# Patient Record
Sex: MALE | Race: WHITE | Employment: FULL TIME | URBAN - METROPOLITAN AREA
[De-identification: names, ages, dates, MRNs, and addresses within clinical notes are randomized per-mention and may not be internally consistent; named-entity substitution may affect disease eponyms.]

---

## 2018-08-22 ENCOUNTER — HOSPITAL ENCOUNTER (OUTPATIENT)
Dept: PHYSICAL THERAPY | Age: 43
Discharge: HOME | End: 2018-08-22
Payer: COMMERCIAL

## 2018-08-22 PROCEDURE — 97014 ELECTRIC STIMULATION THERAPY: CPT

## 2018-08-22 PROCEDURE — 97162 PT EVAL MOD COMPLEX 30 MIN: CPT

## 2018-08-22 PROCEDURE — 97140 MANUAL THERAPY 1/> REGIONS: CPT

## 2018-08-22 NOTE — PROGRESS NOTES
[address] PHYSICAL THERAPY AT [facility]  [address] [facility] [address], Terr[place] jenkins, Arnie Moore  Phone [phone]  Fax [phone] / [address] [address] PHYSICAL THERAPY SERVICES  Patient Name: Rosy Marx : 1975   Medical   Diagnosis: Cervicalgia [M54.2] Treatment Diagnosis: Neck pain   Onset Date: 3+ months ago     Referral Source: Yohan Mendieta MD Start of Care [facility]): 2018   Prior Hospitalization: See medical history Provider #: [id number]   Prior Level of Function: Functionally I   Comorbidities: Prior Hx of Rt foot, Left knee, Right hip, Left hand and Right shoulder surgeries. Medications: Verified on Patient Summary List   The Plan of Care and following information is based on the information from the initial evaluation.   =================================================================================  Assessment / key information:  38year old male presents for PT evaluation with diagnosis of neck pain. Patient reports that pain started greater than 3 months ago with unknown cause. Per patient report, around that time he was participating in drills at work that involved rapid passive neck movement. Patient is unsure if these events are related to the neck pain. Patient is employed by the [employer] as a . Pain can range from a 2-5/10 with intermittent nerve type \"zinger\" pain down through LEft UE. Patient reports pain starts at base of head on the left and travels inferiorly and laterally along Left Lats and into Left UE. Patient also c/o intermittent numbness and tingling into Left UE. Past treatments have included chiropractic care and massage therapy with no significant changes. Negative for red flags. FOTO 46/100.      Functional limitations include decreased ability to look up and to the left effecting ability to check blind sport while driving, decreased ability to turn over in bed, decreased ability to sleep or find comfortable position while sleeping. Clinical exam reveals replication of symptoms with active cervical rotation to the right when starting at end range of left rotation, c/o increased numbness and tingling into Left UE with cervical spine in passive extended position in supine and decreased symptoms with neck in supported neutral position, elevated first rib on Right with numbness into Left UE with mobilization of Right rib and replication of symptoms while performing resisted thoracic rotation. Special tests for Thoracic outlet syndrome negative and manual cervical traction caused no change in symptoms. B UE Strength is 5/5 and increased tone in B Upper traps and Left scalenes and SCM.    Patient will benefit from an episode of skilled PT to address above functional limitations and clinical deficits to improve quality of life and return patient to [facility].     =================================================================================  Eval Complexity: History: HIGH Complexity :3+ comorbidities / personal factors will impact the outcome/ POC Exam:HIGH Complexity : 4+ Standardized tests and measures addressing body structure, function, activity limitation and / or participation in recreation  Presentation: HIGH Complexity : Unstable and unpredictable characteristics  Clinical Decision Making:MEDIUM Complexity : FOTO score of 26-74Overall Complexity:MEDIUM  Problem List: pain affecting function, decrease ROM, decrease strength, decrease ADL/ functional abilitiies and decrease flexibility/ joint mobility   Treatment Plan may include any combination of the following: Therapeutic exercise, Therapeutic activities, Neuromuscular re-education, Physical agent/modality, Manual therapy, Patient education, Self Care training and Functional mobility training  Patient / Family readiness to learn indicated by: asking questions and interest  Persons(s) to be included in education: patient (P)  Barriers to Learning/Limitations: None  Measures taken:    Patient Goal (s): Pain free full ROM   Patient self reported health status: good  Rehabilitation Potential: good   Short Term Goals: To be accomplished in  3  treatments:  1. PT and patient will establish HEP to increase Cervical and Thoracic AROM. 2 Patient will report decreased pain to 2/10 or less to allow for increased ability to perform cervical ROM.  Long Term Goals: To be accomplished in  4-6  weeks:  1. Patient will increase Cervical and Thoracic spine AROM to WNL with painfree arc of motion to allow for return to PLOF. 2. Patient will report decreased frequency of sharp Left sided neck and Left UE pain to 1 x week to allow for increased function use of Left UE. 3. Patient will increase FOTO score from 46 to at least 65/100 to indicate improved functional use of BUE and cervical spine. 4. Patient will demonstrate I and compliance with HEP to allow for DC to HEP at completion of PT. Frequency / Duration:   Patient to be seen  2   times per week for 4-6  weeks:  Patient / Caregiver education and instruction: self care  G-Codes (GP): NA  Therapist Signature: Duglas Díaz, PT Date: 0/66/8765   Certification Period: NA Time: 4:04 PM   ===========================================================================================  I certify that the above Physical Therapy Services are being furnished while the patient is under my care. I agree with the treatment plan and certify that this therapy is necessary. Physician Signature:        Date:       Time:     Please sign and return to [facility] or you may fax the signed copy to [phone]. Thank you.

## 2018-08-22 NOTE — PROGRESS NOTES
PT DAILY TREATMENT NOTE/CERVICAL EVAL3-16    Patient Name: Vilma Martínez  Date:2018  : 1975  [x]  Patient  Verified  Payor: BLUE CROSS / Plan: [facility] / Product Type: PPO /    In time:1020  Out time:1120  Total Treatment Time (min): 60  1:1 time 45 min   Timed Codes: 20 min   Visit #: 1 of 8-    Treatment Area: Cervicalgia [M54.2]    SUBJECTIVE  Pain Level (0-10 scale): 3/10  []constant []intermittent []improving []worsening []no change since onset    Any medication changes, allergies to medications, adverse drug reactions, diagnosis change, or new procedure performed?: [x] No    [] Yes (see summary sheet for update)  Subjective functional status/changes:   38year old male presents for PT evaluation with diagnosis of neck pain. Patient reports that pain started greater than 3 months ago with unknown cause. Per patient report, around that time he was participating in drills at work that involved rapid passive neck movement. Patient is unsure if these events are related to the neck pain. Patient is employed by the [employer] as a  and must carry a 30-35 pound vest and kit while working . Pain can range from a 2-5/10 with intermittent nerve type \"zinger\" pain down through LEft UE. Patient reports pain starts at base of head on the left and travels inferiorly and laterally along Left Lats and into Left UE. Patient also c/o intermittent numbness and tingling into Left UE. Past treatments have included chiropractic care and massage therapy with no significant changes. Negative for red flags. FOTO 46/100.      Functional limitations include decreased ability to look up and to the left effecting ability to check blind sport while driving, decreased ability to turn over in bed, decreased ability to sleep or find comfortable position while sleeping.        OBJECTIVE/EXAMINATION    Modality rationale: decrease pain and increase tissue extensibility to improve the patients ability to tolerate active neck movement    Min Type Additional Details   15 [x] Estim:  [x]Unatt       []IFC  []Premod                        []Other:  []w/ice   [x]w/heat  Position:  Location:    [] Estim: []Att    []TENS instruct  []NMES                    []Other:  []w/US   []w/ice   []w/heat  Position:  Location:    []  Traction: [] Cervical       []Lumbar                       [] Prone          []Supine                       []Intermittent   []Continuous Lbs:  [] before manual  [] after manual    []  Ultrasound: []Continuous   [] Pulsed                           []1MHz   []3MHz Location:  W/cm2:    []  Iontophoresis with dexamethasone         Location: [] Take home patch   [] In clinic    []  Ice     []  heat  []  Ice massage  []  Laser   []  Anodyne Position:  Location:    []  Laser with stim  []  Other: Position:  Location:    []  Vasopneumatic Device Pressure:       [] lo [] med [] hi   Temperature: [] lo [] med [] hi   [] Skin assessment post-treatment:  []intact []redness- no adverse reaction    []redness - adverse reaction:     25 min [x]Eval                  []Re-Eval     20 min Manual Therapy:  MET for elevated 1st rib on Right. MET to facilitate Cervical and Thoracic vertebral rotation   Rationale: decrease pain and increase tissue extensibility to increase functional I and ability to sleep           With   [] TE   [] TA   [] neuro   [] other: Patient Education: [x] Review HEP    [] Progressed/Changed HEP based on:   [] positioning   [] body mechanics   [] transfers   [] heat/ice application    [] other:      Physical Therapy Evaluation Cervical Spine    Clinical exam reveals :  Full Passive Cervical spine motion in supine.    Painful cervical rotation actively  Replication of symptoms with active cervical rotation to the right when starting at end range of left rotation  Increased numbness and tingling into Left UE with cervical spine in passive extended position in supine  Decreased symptoms with neck in supported neutral position  Elevated first rib on Right with numbness into Left UE with mobilization of Right rib  Replication of symptoms while performing resisted thoracic rotation. Special tests for Thoracic outlet syndrome negative and manual cervical traction caused no change in symptoms. B UE Strength is 5/5   Increased tone and decreased flexibility  in B Upper traps and Left scalenes and SCM. Pain Level (0-10 scale) post treatment: 2/10    ASSESSMENT/Changes in Function: Patient will benefit from an episode of skilled PT to address above functional limitations and clinical deficits to improve quality of life and return patient to PLOF. Slight decrease in reports of pain after manual interventions and ESTIM to LEft C spine      [x]  See Plan of Care  []  See progress note/recertification  []  See Discharge Summary         Progress towards goals / Updated goals: · Short Term Goals: To be accomplished in  3  treatments:  1. PT and patient will establish HEP to increase Cervical and Thoracic AROM. 2 Patient will report decreased pain to 2/10 or less to allow for increased ability to perform cervical ROM.    · Long Term Goals: To be accomplished in  4-6  weeks:  1. Patient will increase Cervical and Thoracic spine AROM to WNL with painfree arc of motion to allow for return to PLOF. 2. Patient will report decreased frequency of sharp Left sided neck and Left UE pain to 1 x week to allow for increased function use of Left UE. 3. Patient will increase FOTO score from 46 to at least 65/100 to indicate improved functional use of BUE and cervical spine. 4. Patient will demonstrate I and compliance with HEP to allow for DC to HEP at completion of PT. PLAN  []  Upgrade activities as tolerated     []  Continue plan of care  []  Update interventions per flow sheet       []  Discharge due to:_  [x]  Other:_  2 times a week for 4-6 weeks.      Kalee Mark, PT 8/22/2018  10:25 AM

## 2018-08-27 ENCOUNTER — HOSPITAL ENCOUNTER (OUTPATIENT)
Dept: PHYSICAL THERAPY | Age: 43
Discharge: HOME | End: 2018-08-27
Payer: COMMERCIAL

## 2018-08-27 PROCEDURE — 97140 MANUAL THERAPY 1/> REGIONS: CPT

## 2018-08-27 NOTE — PROGRESS NOTES
PT DAILY TREATMENT NOTE     Patient Name: Zena Dixon  Date:2018  : 1975  [x]  Patient  Verified  Payor: [employer] / Plan: [employer] [facility] / Product Type: PPO /    In time:11:30  Out time:12:30  Total Treatment Time (min): 60  BC/BS 1:1 Time (min) 40  Visit #: 2 of     Treatment Area: Cervicalgia [M54.2]    SUBJECTIVE  Pain Level IN: (0-10 scale): 310   Pain Level OUT: (0-10 scale) post treatment: 1/10    Any medication changes, allergies to medications, adverse drug reactions, diagnosis change, or new procedure performed?: [x] No    [] Yes (see summary sheet for update)  Subjective functional status/changes:   [] No changes reported  I am unable to move my head with looking left or right when I am in the slumped position and then I get those pains shooting into my (L) arm/hand     OBJECTIVE    Modality rationale: decrease edema, decrease inflammation, decrease pain and increase tissue extensibility to improve the patients ability to restore normal movement of the neck/head    Min Type Additional Details    [] Estim:  []Unatt       []IFC  []Premod                        []Other:  []w/ice   []w/heat  Position:  Location:    [] Estim: []Att    []TENS instruct  []NMES                    []Other:  []w/US   []w/ice   []w/heat  Position:  Location:    []  Traction: [] Cervical       []Lumbar                       [] Prone          []Supine                       []Intermittent   []Continuous Lbs:  [] before manual  [] after manual    []  Ultrasound: []Continuous   [] Pulsed                           []1MHz   []3MHz W/cm2:  Location:    []  Iontophoresis with dexamethasone         Location: [] Take home patch   [] In clinic   15 []  Ice     [x]  heat  []  Ice massage  []  Laser   []  Anodyne Position: in semi-reclined   Location: to (B) thoracic/cervical     []  Laser with stim  []  Other:  Position:  Location:    []  Vasopneumatic Device Pressure:       [] lo [] med [] hi Temperature: [] lo [] med [] hi   [] Skin assessment post-treatment:  []intact []redness- no adverse reaction    []redness - adverse reaction:       5 min Therapeutic Exercise:  [x] See flow sheet : first follow up visit since initial evaluation - initiated POC per flow sheet   5 min NC for warm up    Rationale: increase ROM and increase strength to improve the patients ability to restore normal cervical ROM    40 min Manual Therapy: Technique:     IASTM performed. Patient is a candidate for cupping and without contraindications at this time. Patient was instructed in the indications/contraindications of cupping technique. The patient was educated in its purpose and risks/benefits. Patient was advised that redness is normal after technique is performed and that redness will disappear typically within one week. Dynamic cupping technique performed to (L) cervical thoracic area    [x] S/DTM [x]IASTM [x]PROM [] Passive Stretching   [] Graston:  [] GT 1  [] GT 2 [] GT 3 [] GT 4 [] GT 4 [] GT 5  [] GT 6  [] Sweep [] Fan [] [place]  [] Brush   []  Swivel []J- Stroke [] Scoop []IFraming     [x]Manual TPR  [] TDN (see objective; actual needle insertion time not billed)  [x]Jt manipulation:Gr I [x] II [x]  III [] IV[] V[]  Treatment/Area: performed MET to correction T2-5 rotation lesion to the (L) with MET to the (R). MET to correct C2-6 rotation lesion to the (L) with MET to the (R).   MET and mobs to correct posterior ribs T3-6 in supine   Corrected C-T junction lesion with MET to the (R)      Rationale:      decrease pain, increase ROM, increase tissue extensibility and decrease trigger points to improve patient's ability to rotate head and perform normal cervical motion in all planes without pain shooting into the (L) arm         With   [] TE   [] TA   [] neuro   [] other: Patient Education: [x] Review HEP    [] Progressed/Changed HEP based on:   [] positioning   [] body mechanics   [] transfers   [] heat/ice application [] Graston Education: Explained the effects and benefits of Graston Technique therapy including potential for post treatment soreness and bruising. [] Other:           Objective/Functional Measures with Therapist Assessment Noted with Response to Therapy Session:     first follow up visit since initial evaluation -       initiated POC per flow sheet   Was able to correction rotation lesion to the (L) to Cervical and thoracic vertbraes with MET to the (R)  Mobs performed to the (L) posterior rib approx T3-6  Patient was able to perform cervical cervical rotation (L) and (R) with less pain shooting into the (L) arm and improved range noted. Performed cupping to decrease soft tissue restriction to the (L) medial border of scap secondary to positive door bell C5-6 causing pain directly into the (L) arm        ASSESSMENT/Changes in Function:     Patient will continue to benefit from skilled PT services to modify and progress therapeutic interventions, address functional mobility deficits, address ROM deficits, address strength deficits, analyze and address soft tissue restrictions and assess and modify postural abnormalities to attain remaining goals. [x]  See Plan of Care  []  See progress note/recertification  []  See Discharge Summary         Progress towards goals / Updated goals: · Short Term Goals: To be accomplished in  3  treatments:  1. PT and patient will establish HEP to increase Cervical and Thoracic AROM. 2 Patient will report decreased pain to 2/10 or less to allow for increased ability to perform cervical ROM.    · Long Term Goals: To be accomplished in  4-6  weeks:  1. Patient will increase Cervical and Thoracic spine AROM to WNL with painfree arc of motion to allow for return to PLOF. 2. Patient will report decreased frequency of sharp Left sided neck and Left UE pain to 1 x week to allow for increased function use of Left UE.    3. Patient will increase FOTO score from 46 to at least 65/100 to indicate improved functional use of BUE and cervical spine. 4. Patient will demonstrate I and compliance with HEP to allow for DC to HEP at completion of PT.     PLAN  [x]  Upgrade activities as tolerated     [x]  Continue plan of care  []  Update interventions per flow sheet       []  Discharge due to:_  []  Other:_      Walt Pedraza PTA 8/27/2018  2:54 PM    Future Appointments  Date Time Provider Ayaz Kelly   8/29/2018 9:30 AM Walt Pedraza PTA [facility]   9/4/2018 10:00 AM Walt Pedraza PTA [facility]   9/6/2018 4:00 PM Bradley Dacosta, [facility]   9/11/2018 9:30 AM Walt Pedraza PTA [facility]   9/13/2018 4:00 PM Bradley Dacosta PT [facility]   9/18/2018 9:30 AM Walt Pedraza PTA [facility]   9/20/2018 4:00 PM Bradley Dacosta, [facility]

## 2018-08-29 ENCOUNTER — HOSPITAL ENCOUNTER (OUTPATIENT)
Dept: PHYSICAL THERAPY | Age: 43
Discharge: HOME | End: 2018-08-29
Payer: COMMERCIAL

## 2018-08-29 PROCEDURE — 97140 MANUAL THERAPY 1/> REGIONS: CPT

## 2018-08-29 PROCEDURE — 97012 MECHANICAL TRACTION THERAPY: CPT

## 2018-08-29 NOTE — PROGRESS NOTES
PT DAILY TREATMENT NOTE     Patient Name: Stevan Coko  Date:2018  : 1975  [x]  Patient  Verified  Payor: BLUE CROSS / Plan: [employer] [facility] / Product Type: PPO /    In time:9:30  Out time:10:45  Total Treatment Time (min): 75  BC/BS 1:1 Time (min) 50  Visit #: 3 of     Treatment Area: Cervicalgia [M54.2]    SUBJECTIVE  Pain Level IN: (0-10 scale): 2/10   Pain Level OUT: (0-10 scale) post treatment: 1/10    Any medication changes, allergies to medications, adverse drug reactions, diagnosis change, or new procedure performed?: [x] No    [] Yes (see summary sheet for update)  Subjective functional status/changes:   [] No changes reported  I did a lot better after the last time I was here definitely more range until 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 I did sleep better and less tingling in my arm too while I was trying to sleep.     OBJECTIVE    Modality rationale: decrease edema, decrease inflammation, decrease pain and increase tissue extensibility to improve the patients ability to restore normal movement of the neck/head    Min Type Additional Details    [] Estim:  []Unatt       []IFC  []Premod                        []Other:  []w/ice   []w/heat  Position:  Location:    [] Estim: []Att    []TENS instruct  []NMES                    []Other:  []w/US   []w/ice   []w/heat  Position:  Location:   15+5 set up  [x]  Traction: [x] Cervical       []Lumbar                       [] Prone          []Supine                       []Intermittent   []Continuous Lbs:#25/19  [] before manual  [x] after manual    []  Ultrasound: []Continuous   [] Pulsed                           []1MHz   []3MHz W/cm2:  Location:    []  Iontophoresis with dexamethasone         Location: [] Take home patch   [] In clinic    []  Ice     [x]  heat  []  Ice massage  []  Laser   []  Anodyne Position: in semi-reclined   Location: to (B) thoracic/cervical     []  Laser with stim  [] Other:  Position:  Location:    []  Vasopneumatic Device Pressure:       [] lo [] med [] hi   Temperature: [] lo [] med [] hi   [] Skin assessment post-treatment:  []intact []redness- no adverse reaction    []redness - adverse reaction:       10/5 min Therapeutic Exercise:  [x] See flow sheet :  5 min NC for warm up    Rationale: increase ROM and increase strength to improve the patients ability to restore normal cervical ROM    45 min Manual Therapy: Technique:     IASTM performed. Patient is a candidate for cupping and without contraindications at this time. Patient was instructed in the indications/contraindications of cupping technique. The patient was educated in its purpose and risks/benefits. Patient was advised that redness is normal after technique is performed and that redness will disappear typically within one week. Dynamic cupping technique performed to (L) cervical thoracic area    [x] S/DTM [x]IASTM [x]PROM [] Passive Stretching   [] Graston:  [] GT 1  [] GT 2 [] GT 3 [] GT 4 [] GT 4 [] GT 5  [] GT 6  [] Sweep [] Fan [] [place]  [] Brush   []  Swivel []J- Stroke [] Scoop []IFraming     [x]Manual TPR  [] TDN (see objective; actual needle insertion time not billed)  [x]Jt manipulation:Gr I [x] II [x]  III [] IV[] V[]  Treatment/Area: performed MET to correction T2-5 rotation lesion to the (L) with MET to the (R). MET to correct C2-6 rotation lesion to the (L) with MET to the (R).   MET and mobs to correct posterior ribs T3-6 in supine   Corrected C-T junction lesion with MET to the (R)      Rationale:      decrease pain, increase ROM, increase tissue extensibility and decrease trigger points to improve patient's ability to rotate head and perform normal cervical motion in all planes without pain shooting into the (L) arm         With   [] TE   [] TA   [] neuro   [] other: Patient Education: [x] Review HEP    [] Progressed/Changed HEP based on:   [] positioning   [] body mechanics   [] transfers   [] heat/ice application    [] Graston Education: Explained the effects and benefits of Graston Technique therapy including potential for post treatment soreness and bruising. [] Other:           Objective/Functional Measures with Therapist Assessment Noted with Response to Therapy Session:   Patient arrived today with less pain with active cervical and thoracic rotation left and right with some increase discomfort with lateral side bending to the (L) and (R) with some discomfort in both directions  Performed MET to cervical/thoracic and (L) posterior ribs with increase ROM and less pain afterwards. Attempted cervical traction today to assist with decreasing tingling and numbness in the (L) hand that still remained even after manual with active cervical rotation in a slump position - forward head and rounded shoulders. GOALS:  2 Patient will report decreased pain to 2/10 or less to allow for increased ability to perform cervical ROM. MET 8/29/2018     ASSESSMENT/Changes in Function:     Patient will continue to benefit from skilled PT services to modify and progress therapeutic interventions, address functional mobility deficits, address ROM deficits, address strength deficits, analyze and address soft tissue restrictions and assess and modify postural abnormalities to attain remaining goals. [x]  See Plan of Care  []  See progress note/recertification  []  See Discharge Summary         Progress towards goals / Updated goals: · Short Term Goals: To be accomplished in  3  treatments:  1. PT and patient will establish HEP to increase Cervical and Thoracic AROM. 2 Patient will report decreased pain to 2/10 or less to allow for increased ability to perform cervical ROM. MET 8/29/2018     · Long Term Goals: To be accomplished in  4-6  weeks:  1. Patient will increase Cervical and Thoracic spine AROM to WNL with painfree arc of motion to allow for return to PLOF.    2. Patient will report decreased frequency of sharp Left sided neck and Left UE pain to 1 x week to allow for increased function use of Left UE. 3. Patient will increase FOTO score from 46 to at least 65/100 to indicate improved functional use of BUE and cervical spine. 4. Patient will demonstrate I and compliance with HEP to allow for DC to HEP at completion of PT.     PLAN  [x]  Upgrade activities as tolerated     [x]  Continue plan of care  []  Update interventions per flow sheet       []  Discharge due to:_  []  Other:_      Meryl Henning PTA 8/29/2018  2:54 PM    Future Appointments  Date Time Provider Ayaz Kelly   8/29/2018 9:30 AM Meryl Henning PTA [facility]   9/4/2018 10:00 AM Meryl Henning PTA [facility]   9/6/2018 4:00 PM Debbie Shubuta, PT [facility]   9/11/2018 9:30 AM Meryl Henning PTA [facility]   9/13/2018 4:00 PM Debbie Shubuta, PT [facility]   9/18/2018 9:30 AM Meryl Henning PTA [facility]   9/20/2018 4:00 PM Debbie Shubuta, PT [facility]

## 2018-09-04 ENCOUNTER — HOSPITAL ENCOUNTER (OUTPATIENT)
Dept: PHYSICAL THERAPY | Age: 43
Discharge: HOME | End: 2018-09-04
Payer: COMMERCIAL

## 2018-09-04 PROCEDURE — 97012 MECHANICAL TRACTION THERAPY: CPT

## 2018-09-04 PROCEDURE — 97140 MANUAL THERAPY 1/> REGIONS: CPT

## 2018-09-04 NOTE — PROGRESS NOTES
PT DAILY TREATMENT NOTE     Patient Name: Chris Kidd  Date:2018  : 1975  [x]  Patient  Verified  Payor: BLUE CROSS / Plan: [facility] [facility] [place] / Product Type: PPO /    In time:10:01  Out time:11:17  Total Treatment Time (min): 76  BC/BS 1:1 Time (min) 50  Visit #: 4 of     Treatment Area: Cervicalgia [M54.2]    SUBJECTIVE  Pain Level IN: (0-10 scale): 310   Pain Level OUT: (0-10 scale) post treatment: 1/10    Any medication changes, allergies to medications, adverse drug reactions, diagnosis change, or new procedure performed?: [x] No    [] Yes (see summary sheet for update)  Subjective functional status/changes:   [] No changes reported  So after last time I was here and I got the traction for the first time, I was really stiff for the first couple of days and but the tingling in the hand was better and I did sleep better - but then it has started to come back again     OBJECTIVE    Modality rationale: decrease edema, decrease inflammation, decrease pain and increase tissue extensibility to improve the patients ability to restore normal movement of the neck/head    Min Type Additional Details    [] Estim:  []Unatt       []IFC  []Premod                        []Other:  []w/ice   []w/heat  Position:  Location:    [] Estim: []Att    []TENS instruct  []NMES                    []Other:  []w/US   []w/ice   []w/heat  Position:  Location:   15+5 set up  [x]  Traction: [x] Cervical       []Lumbar                       [] Prone          []Supine                       []Intermittent   []Continuous Lbs:#25/19  [] before manual  [x] after manual    []  Ultrasound: []Continuous   [] Pulsed                           []1MHz   []3MHz W/cm2:  Location:    []  Iontophoresis with dexamethasone         Location: [] Take home patch   [] In clinic    []  Ice     [x]  heat  []  Ice massage  []  Laser   []  Anodyne Position: in semi-reclined   Location: to (B) thoracic/cervical     []  Laser with stim  [] Other:  Position:  Location:    []  Vasopneumatic Device Pressure:       [] lo [] med [] hi   Temperature: [] lo [] med [] hi   [] Skin assessment post-treatment:  []intact []redness- no adverse reaction    []redness - adverse reaction:       6 min Therapeutic Exercise:  [x] See flow sheet :  6 min NC for warm up    Rationale: increase ROM and increase strength to improve the patients ability to restore normal cervical ROM    50 min Manual Therapy: Technique:     IASTM performed. Patient is a candidate for cupping and without contraindications at this time. Patient was instructed in the indications/contraindications of cupping technique. The patient was educated in its purpose and risks/benefits. Patient was advised that redness is normal after technique is performed and that redness will disappear typically within one week. Dynamic cupping technique performed to (L) cervical thoracic area    [x] S/DTM [x]IASTM [x]PROM [] Passive Stretching   [] Graston:  [] GT 1  [] GT 2 [] GT 3 [] GT 4 [] GT 4 [] GT 5  [] GT 6  [] Sweep [] Fan [] [place]  [] Brush   []  Swivel []J- Stroke [] Scoop []IFraming     [x]Manual TPR  [] TDN (see objective; actual needle insertion time not billed)  [x]Jt manipulation:Gr I [x] II [x]  III [] IV[] V[]  Treatment/Area: performed MET to correction T2-5 rotation lesion to the (L) with MET to the (R). MET to correct C2-6 rotation lesion to the (L) with MET to the (R).   MET and mobs to correct posterior ribs T3-6 in supine   Corrected C-T junction lesion with MET to the (R)   Corrected 1st rib elevation on the (L) with MET     Rationale:      decrease pain, increase ROM, increase tissue extensibility and decrease trigger points to improve patient's ability to rotate head and perform normal cervical motion in all planes without pain shooting into the (L) arm         With   [] TE   [] TA   [] neuro   [] other: Patient Education: [x] Review HEP    [] Progressed/Changed HEP based on:   [] positioning   [] body mechanics   [] transfers   [] heat/ice application    [] Graston Education: Explained the effects and benefits of Graston Technique therapy including potential for post treatment soreness and bruising. [] Other:           Objective/Functional Measures with Therapist Assessment Noted with Response to Therapy Session:  Patient continues to require increase time to perform all MET to the thoracic/cervical/rib to achieve improved alignment and decrease symptoms in the hand and CT spine and improve cervical ROM with rotation and lateral flexion -corrected the (L) 1st rib for the first time today   After manual increased cervical and thoracic rotation did experience some stiffness after traction however denies any referred pain into the (L) hand/arm      ASSESSMENT/Changes in Function:     Patient will continue to benefit from skilled PT services to modify and progress therapeutic interventions, address functional mobility deficits, address ROM deficits, address strength deficits, analyze and address soft tissue restrictions and assess and modify postural abnormalities to attain remaining goals. [x]  See Plan of Care  []  See progress note/recertification  []  See Discharge Summary         Progress towards goals / Updated goals: · Short Term Goals: To be accomplished in  3  treatments:  1. PT and patient will establish HEP to increase Cervical and Thoracic AROM. 2 Patient will report decreased pain to 2/10 or less to allow for increased ability to perform cervical ROM. MET 8/29/2018     · Long Term Goals: To be accomplished in  4-6  weeks:  1. Patient will increase Cervical and Thoracic spine AROM to WNL with painfree arc of motion to allow for return to PLOF. 2. Patient will report decreased frequency of sharp Left sided neck and Left UE pain to 1 x week to allow for increased function use of Left UE.    3. Patient will increase FOTO score from 46 to at least 65/100 to indicate improved functional use of BUE and cervical spine. 4. Patient will demonstrate I and compliance with HEP to allow for DC to HEP at completion of PT.     PLAN  [x]  Upgrade activities as tolerated     [x]  Continue plan of care  []  Update interventions per flow sheet       []  Discharge due to:_  []  Other:_      Johnny Kc PTA 9/4/2018  2:54 PM    Future Appointments  Date Time Provider Ayaz Kelly   9/6/2018 4:00 PM [facility]   9/11/2018 9:30 AM Johnny Kc PTA [facility]   9/13/2018 4:00 PM Brie Becerra PT [facility]   9/18/2018 9:30 AM Johnny Kc PTA [facility]   9/20/2018 4:00 PM Brie Becerra PT [facility]

## 2018-09-06 ENCOUNTER — HOSPITAL ENCOUNTER (OUTPATIENT)
Dept: PHYSICAL THERAPY | Age: 43
Discharge: HOME | End: 2018-09-06
Payer: COMMERCIAL

## 2018-09-06 PROCEDURE — 97140 MANUAL THERAPY 1/> REGIONS: CPT

## 2018-09-06 PROCEDURE — 97012 MECHANICAL TRACTION THERAPY: CPT

## 2018-09-06 NOTE — PROGRESS NOTES
PT DAILY TREATMENT NOTE     Patient Name: Shruthi Jarvis  Date:2018  : 1975  [x]  Patient  Verified  Payor: BLUE CROSS / [facility] / Product Type: PPO /    In time:405  Out time:548  Total Treatment Time (min): 103  Total Timed Codes (min): 73  1:1 Treatment Time (min): 50   Visit #: 5 of     Treatment Area: Cervicalgia [M54.2]    SUBJECTIVE  Pain Level (0-10 scale): 3/10  Any medication changes, allergies to medications, adverse drug reactions, diagnosis change, or new procedure performed?: [x] No    [] Yes (see summary sheet for update)  Subjective functional status/changes:   [] No changes reported  Do you think that I should get an MRI>    OBJECTIVE  Modality rationale: decrease pain and increase tissue extensibility to improve the patients ability to improve functional I    Min Type Additional Details    [] Estim: []Att   []Unatt        []TENS instruct                  []IFC  []Premod   []NMES                     []Other:  []w/US   []w/ice   []w/heat  Position:  Location:   15+5 [x]  Traction: [x] Cervical       []Lumbar                       [] Prone          [x]Supine                       []Intermittent   []Continuous Lbs:  [] before manual  [] after manual    []  Ultrasound: []Continuous   [] Pulsed                           []1MHz   []3MHz Location:  W/cm2:    []  Iontophoresis with dexamethasone         Location: [] Take home patch   [] In clinic    []  Ice     []  heat  []  Ice massage Position:  Location:    []  Vasopneumatic Device Pressure:       [] lo [] med [] hi   Temperature: [] lo [] med [] hi   [x] Skin assessment post-treatment:  [x]intact []redness- no adverse reaction       []redness - adverse reaction:       33/28 min Therapeutic Exercise:  [x] See flow sheet : 5 min NC for warm up    Rationale: increase strength to improve the patients ability to resume full work duties       50 min Manual Therapy:  Prone PA grade 3-4 T spine mobes  Prone Grade 4-5 PA rib mobes along ribs 4-7  Lumbar roll B  IASTM along T paraspinals and periscapular mm     Rationale: decrease pain, increase ROM and increase tissue extensibility to improve functional mobility      min Gait Training:  ___ feet with ___ device on level surfaces with ___ level of assist   Rationale:           min Patient Education: [x] Review HEP    [] Progressed/Changed HEP based on:   [] positioning   [] body mechanics   [] transfers   [] heat/ice application        Other Objective/Functional Measures:   Improved cervical retraction post manual    Pain Level (0-10 scale) post treatment: 1/10    ASSESSMENT/Changes in Function: Patient is progressing with improved functional use of UEs and c spine without increased pain   Patient tolerated all new therex well with no reports of increased pain. Patient will continue to benefit from skilled PT services to modify and progress therapeutic interventions, address functional mobility deficits, address ROM deficits, address strength deficits and analyze and address soft tissue restrictions to attain remaining goals.      []  See Plan of Care  []  See progress note/recertification  []  See Discharge Summary         Progress towards goals / Updated goals:  PRogressing towards goals, reassess when able     PLAN  [x]  Upgrade activities as tolerated     []  Continue plan of care  []  Update interventions per flow sheet       []  Discharge due to:_  []  Other:_      Jami Liu PT 9/6/2018  10:35 AM

## 2018-09-11 ENCOUNTER — APPOINTMENT (OUTPATIENT)
Dept: PHYSICAL THERAPY | Age: 43
End: 2018-09-11
Payer: COMMERCIAL

## 2018-09-13 ENCOUNTER — HOSPITAL ENCOUNTER (OUTPATIENT)
Dept: PHYSICAL THERAPY | Age: 43
Discharge: HOME | End: 2018-09-13
Payer: COMMERCIAL

## 2018-09-13 PROCEDURE — 97014 ELECTRIC STIMULATION THERAPY: CPT | Performed by: PHYSICAL THERAPIST

## 2018-09-13 PROCEDURE — 97140 MANUAL THERAPY 1/> REGIONS: CPT | Performed by: PHYSICAL THERAPIST

## 2018-09-13 NOTE — PROGRESS NOTES
PT DAILY TREATMENT NOTE - [facility]     Patient Name: Deedee Lanier  Date:2018  : 1975  [x]  Patient  Verified  Payor: BLUE CROSS / Plan: [employer] [facility] [place] / Product Type: PPO /    In time:105  Out time:150  Total Treatment Time (min): 45  Total Timed Codes (min): 30  1:1 Treatment Time ( only): 30   Visit #: 6 of     Treatment Area: Cervicalgia [M54.2]    SUBJECTIVE  Pain Level (0-10 scale): 2-3/10  Any medication changes, allergies to medications, adverse drug reactions, diagnosis change, or new procedure performed?: [x] No    [] Yes (see summary sheet for update)  Subjective functional status/changes:   [] No changes reported  Pt states that he has pain on the L side of his neck/shoulder    OBJECTIVE    Modality rationale: decrease pain and increase tissue extensibility to improve the patients ability to improve post IMT soreness    Min Type Additional Details   15 [x] Estim: []Att   []Unatt        []TENS instruct                  []IFC  [x]Premod   []NMES                     []Other:  []w/US   []w/ice   [x]w/heat  Position: prone  Location: L UT     []  Traction: [] Cervical       []Lumbar                       [] Prone          []Supine                       []Intermittent   []Continuous Lbs:  [] before manual  [] after manual    []  Ultrasound: []Continuous   [] Pulsed                           []1MHz   []3MHz Location:  W/cm2:    []  Iontophoresis with dexamethasone         Location: [] Take home patch   [] In clinic    []  Ice     []  heat  []  Ice massage Position:  Location:    []  Laser  []  Other: Position:  Location:    []  Vasopneumatic Device Pressure:       [] lo [] med [] hi   Temperature: [] lo [] med [] hi   [] Skin assessment post-treatment:  []intact []redness- no adverse reaction    []redness - adverse reaction:     30 with warmup  min Manual Therapy: Technique:      [] S/DTM []IASTM []PROM [] Passive Stretching   [] Graston:  [] GT 1  [] GT 2 [] GT 3 [] GT 4 [] GT 4 [] GT 5  [] GT 6  [] Sweep [] Fan [] [place]  [] Brush   []  Swivel []J- Stroke [] Scoop []IFraming     [x]Manual TPR  [x] TDN (see objective; actual needle insertion time not billed)  []Jt manipulation:Gr I [] II []  III [] IV[] V[]  Treatment/Area:  L UT/ Mid trap/LS     Rationale:      decrease pain, increase ROM, increase tissue extensibility and decrease trigger points to improve patient's ability to improve rotation of the cervical spine with drivng          With   [] TE   [] TA   [] neuro   [] other: Patient Education: [x] Review HEP    [] Progressed/Changed HEP based on:   [] positioning   [] body mechanics   [] transfers   [] heat/ice application    [] Graston Education: Explained the effects and benefits of Graston Technique therapy including potential for post treatment soreness and bruising. [] Other:      Dry Needling Procedure Note    Dry Needle Session Number:  1    Procedure: An intramuscular manual therapy (dry needling) and a neuro-muscular re-education treatment was done to deactivate myofascial trigger points, with a 15/30 gauge solid filament needle, under aseptic technique. Indication(s): [x] Muscle spasms [] Myalgia/Myositis  [] Muscle cramps      [x] Muscle imbalances [] TMD (TMJ) [x] Myofascial pain & dysfunction     [] Other: __    Chart reviewed for the following:  Lizzette ZHAO DPT, have reviewed the medical history, summary list and precautions/contraindications for [employer].     TIME OUT performed immediately prior to start of procedure:  135 PM  (enter time the timeout was completed)  Lizzette ZHAO DPT, have performed the following reviews on [employer] prior to the start of the session:      [x] Patient was identified by name and date of birth    [x] Agreement on all muscles being treated was verified   [x] Purpose of dry needling, side effects, possible complications, risks and benefits were explained to the patient   [x] Procedure site(s) verified  [x] Patient was positioned for comfort and draped for privacy  [x] Informed Consent was signed (initial visit) and verified verbally (subsequent visits)  [x] Patient was instructed on the need to report the use of blood thinners and/or immunosuppressant medications  [x] How to respond to possible adverse effects of treatment  [x] Self treatment of post needling soreness: ice, heat (moist heat, heat wraps) and stretching  [x] Opportunity was given to ask any questions, all questions were answered            Treatment:  The following muscles were treated today:    Right:    Left:  L UT/ Mid trap/LS      Patients response to todays treatment:   [x]  LTRs  [x]  Muscle Relaxation  [x]  Pain Relief  []  Decreased Tinnitus  []  Decreased HAs [x]  Post needling soreness [x]  Increased ROM   []  Other:      Other Objective/Functional Measures:   + response in above needled muscles     Pain Level (0-10 scale) post treatment: sore    ASSESSMENT/Changes in Function:   + response in above needled muscles with increased soreness after treatment. Assess effects next session      Patient will continue to benefit from skilled PT services to modify and progress therapeutic interventions, address functional mobility deficits, address ROM deficits, address strength deficits, analyze and address soft tissue restrictions and analyze and modify body mechanics/ergonomics to attain remaining goals. Progress towards goals / Updated goals: · Short Term Goals: To be accomplished in  3  treatments:  1. PT and patient will establish HEP to increase Cervical and Thoracic AROM. 2 Patient will report decreased pain to 2/10 or less to allow for increased ability to perform cervical ROM. MET 8/29/2018      · Long Term Goals: To be accomplished in  4-6  weeks:  1. Patient will increase Cervical and Thoracic spine AROM to WNL with painfree arc of motion to allow for return to PLOF.    2. Patient will report decreased frequency of sharp Left sided neck and Left UE pain to 1 x week to allow for increased function use of Left UE. 3. Patient will increase FOTO score from 46 to at least 65/100 to indicate improved functional use of BUE and cervical spine. 4. Patient will demonstrate I and compliance with HEP to allow for DC to HEP at completion of PT.     PLAN  [x]  Upgrade activities as tolerated     [x]  Continue plan of care  []  Update interventions per flow sheet       []  Discharge due to:_  []  Other:_      Lyly Earl DPT 9/13/2018  242  PM

## 2018-09-18 ENCOUNTER — HOSPITAL ENCOUNTER (OUTPATIENT)
Dept: PHYSICAL THERAPY | Age: 43
Discharge: HOME | End: 2018-09-18
Payer: COMMERCIAL

## 2018-09-18 PROCEDURE — 97140 MANUAL THERAPY 1/> REGIONS: CPT

## 2018-09-18 PROCEDURE — 97014 ELECTRIC STIMULATION THERAPY: CPT

## 2018-09-18 PROCEDURE — 97012 MECHANICAL TRACTION THERAPY: CPT

## 2018-09-18 NOTE — PROGRESS NOTES
PT DAILY TREATMENT NOTE     Patient Name: Janee Rahman  Date:2018  : 1975  [x]  Patient  Verified  Payor: Roslyn Hernandez / Plan:  [facility] [place] / Product Type: PPO /    In time: 9:35 Out time:10:42  Total Treatment Time (min): 79  BC/BS 1:1 Time (min) 40  Visit #: 7 of     Treatment Area: Cervicalgia [M54.2]    SUBJECTIVE  Pain Level IN: (0-10 scale): 3/10   Pain Level OUT: (0-10 scale) post treatment: 0/10    Any medication changes, allergies to medications, adverse drug reactions, diagnosis change, or new procedure performed?: [x] No    [] Yes (see summary sheet for update)  Subjective functional status/changes:   [] No changes reported  The dry needling did really help me a lot - I can move better and sleep better - but now I starting to feel it again but not as bad as before  OBJECTIVE    Modality rationale: decrease edema, decrease inflammation, decrease pain and increase tissue extensibility to improve the patients ability to restore normal movement of the neck/head    Min Type Additional Details   15 [x] Estim:  [x]Unatt       []IFC  [x]Premod                        []Other:  []w/ice   [x]w/heat  Position:in supine  Location: (B) medial scap with TRACTION    [] Estim: []Att    []TENS instruct  []NMES                    []Other:  []w/US   []w/ice   []w/heat  Position:  Location:   15+5 set up  [x]  Traction: [x] Cervical       []Lumbar                       [] Prone          []Supine                       []Intermittent   []Continuous Lbs:#25/19  [] before manual  [x] after manual    []  Ultrasound: []Continuous   [] Pulsed                           []1MHz   []3MHz W/cm2:  Location:    []  Iontophoresis with dexamethasone         Location: [] Take home patch   [] In clinic    []  Ice     [x]  heat  []  Ice massage  []  Laser   []  Anodyne Position: in semi-reclined   Location: to (B) thoracic/cervical     []  Laser with stim  []  Other:  Position:  Location:    [] Vasopneumatic Device Pressure:       [] lo [] med [] hi   Temperature: [] lo [] med [] hi   [] Skin assessment post-treatment:  []intact []redness- no adverse reaction    []redness - adverse reaction:       7 min Therapeutic Exercise:  [x] See flow sheet :  7 min NC for warm up    Rationale: increase ROM and increase strength to improve the patients ability to restore normal cervical ROM    40 min Manual Therapy: Technique:     IASTM performed. Patient is a candidate for cupping and without contraindications at this time. Patient was instructed in the indications/contraindications of cupping technique. The patient was educated in its purpose and risks/benefits. Patient was advised that redness is normal after technique is performed and that redness will disappear typically within one week. Dynamic cupping technique performed to (L) cervical thoracic area    [x] S/DTM [x]IASTM [x]PROM [] Passive Stretching   [] Graston:  [] GT 1  [] GT 2 [] GT 3 [] GT 4 [] GT 4 [] GT 5  [] GT 6  [] Sweep [] Fan [] [place]  [] Brush   []  Swivel []J- Stroke [] Scoop []IFraming     [x]Manual TPR  [] TDN (see objective; actual needle insertion time not billed)  [x]Jt manipulation:Gr I [x] II [x]  III [] IV[] V[]  Treatment/Area: performed MET to correction T2-5 rotation lesion to the (L) with MET to the (R). MET to correct C2-6 rotation lesion to the (L) with MET to the (R).   MET and mobs to correct posterior ribs T3-6 in supine   Corrected C-T junction lesion with MET to the (R)        Rationale:      decrease pain, increase ROM, increase tissue extensibility and decrease trigger points to improve patient's ability to rotate head and perform normal cervical motion in all planes without pain shooting into the (L) arm         With   [] TE   [] TA   [] neuro   [] other: Patient Education: [x] Review HEP    [] Progressed/Changed HEP based on:   [] positioning   [] body mechanics   [] transfers   [] heat/ice application    [] [place] Education: Explained the effects and benefits of Graston Technique therapy including potential for post treatment soreness and bruising. [] Other:           Objective/Functional Measures with Therapist Assessment Noted with Response to Therapy Session:  Patient was able to perform cervical retraction with less pain and improved motion as well as almost complete and equal cervical rotation (L) and (R) with only small decrease of cervical rotation to the (R)   Decreased reports of numbness in the (L) arm   GOALS - 1. Patient will increase Cervical and Thoracic spine AROM to WNL with painfree arc of motion to allow for return to PLOF. MET 9/18/2018     ASSESSMENT/Changes in Function:     Patient will continue to benefit from skilled PT services to modify and progress therapeutic interventions, address functional mobility deficits, address ROM deficits, address strength deficits, analyze and address soft tissue restrictions and assess and modify postural abnormalities to attain remaining goals. [x]  See Plan of Care  []  See progress note/recertification  []  See Discharge Summary         Progress towards goals / Updated goals: · Short Term Goals: To be accomplished in  3  treatments:  1. PT and patient will establish HEP to increase Cervical and Thoracic AROM. 2 Patient will report decreased pain to 2/10 or less to allow for increased ability to perform cervical ROM. MET 8/29/2018     · Long Term Goals: To be accomplished in  4-6  weeks:  1. Patient will increase Cervical and Thoracic spine AROM to WNL with painfree arc of motion to allow for return to PLOF. MET 9/18/2018   2. Patient will report decreased frequency of sharp Left sided neck and Left UE pain to 1 x week to allow for increased function use of Left UE. 3. Patient will increase FOTO score from 46 to at least 65/100 to indicate improved functional use of BUE and cervical spine.    4. Patient will demonstrate I and compliance with HEP to allow for DC to HEP at completion of PT.     PLAN  [x]  Upgrade activities as tolerated     [x]  Continue plan of care  []  Update interventions per flow sheet       []  Discharge due to:_  []  Other:_      Larry Mckeon PTA 9/18/2018  2:54 PM    Future Appointments  Date Time Provider Ayaz Klely   9/20/2018 2:30 PM Za Blue DPT [facility]

## 2018-09-20 ENCOUNTER — HOSPITAL ENCOUNTER (OUTPATIENT)
Dept: PHYSICAL THERAPY | Age: 43
Discharge: HOME | End: 2018-09-20
Payer: COMMERCIAL

## 2018-09-20 PROCEDURE — 97140 MANUAL THERAPY 1/> REGIONS: CPT | Performed by: PHYSICAL THERAPIST

## 2018-09-20 PROCEDURE — 97014 ELECTRIC STIMULATION THERAPY: CPT | Performed by: PHYSICAL THERAPIST

## 2018-09-20 NOTE — PROGRESS NOTES
PT DAILY TREATMENT NOTE - [facility]     Patient Name: Amparo Martinez  Date:2018  : 1975  [x]  Patient  Verified  Payor: BLUE CROSS / Plan: [facility] / Product Type: PPO /    In time:230  Out time:328  Total Treatment Time (min): 58  Total Timed Codes (min): 43  1:1 Treatment Time ( only): 43   Visit #: 8 of     Treatment Area: Cervicalgia [M54.2]    SUBJECTIVE  Pain Level (0-10 scale): 2/10  Any medication changes, allergies to medications, adverse drug reactions, diagnosis change, or new procedure performed?: [x] No    [] Yes (see summary sheet for update)  Subjective functional status/changes:   [] No changes reported  Pt reports that his UT feels better he just feels tightness in the LS and the side of his L neck.      OBJECTIVE    Modality rationale: decrease pain and increase tissue extensibility to improve the patients ability to improve post IMT soreness    Min Type Additional Details   15 [x] Estim: []Att   []Unatt        []TENS instruct                  []IFC  [x]Premod   []NMES                     []Other:  []w/US   []w/ice   [x]w/heat  Position: prone  Location: L UT     []  Traction: [] Cervical       []Lumbar                       [] Prone          []Supine                       []Intermittent   []Continuous Lbs:  [] before manual  [] after manual    []  Ultrasound: []Continuous   [] Pulsed                           []1MHz   []3MHz Location:  W/cm2:    []  Iontophoresis with dexamethasone         Location: [] Take home patch   [] In clinic    []  Ice     []  heat  []  Ice massage Position:  Location:    []  Laser  []  Other: Position:  Location:    []  Vasopneumatic Device Pressure:       [] lo [] med [] hi   Temperature: [] lo [] med [] hi   [] Skin assessment post-treatment:  []intact []redness- no adverse reaction    []redness - adverse reaction:     43  min Manual Therapy: Technique:      [x] S/DTM []IASTM [x]PROM [x] Passive Stretching   [] Graston:  [] GT 1  [] GT 2 [] GT 3 [] GT 4 [] GT 4 [] GT 5  [] GT 6  [] Sweep [] Fan [] [place]  [] Brush   []  Swivel []J- Stroke [] Scoop []IFraming     [x]Manual TPR  [x] TDN (see objective; actual needle insertion time not billed)  []Jt manipulation:Gr I [] II []  III [] IV[] V[]  Treatment/Area:  L UT/ Mid trap/LS/Scalenes     Rationale:      decrease pain, increase ROM, increase tissue extensibility and decrease trigger points to improve patient's ability to improve rotation of the cervical spine with drivng          With   [] TE   [] TA   [] neuro   [] other: Patient Education: [x] Review HEP    [] Progressed/Changed HEP based on:   [] positioning   [] body mechanics   [] transfers   [] heat/ice application    [] Graston Education: Explained the effects and benefits of Graston Technique therapy including potential for post treatment soreness and bruising. [] Other:      Dry Needling Procedure Note    Dry Needle Session Number:  2    Procedure: An intramuscular manual therapy (dry needling) and a neuro-muscular re-education treatment was done to deactivate myofascial trigger points, with a 15/30 gauge solid filament needle, under aseptic technique. Indication(s): [x] Muscle spasms [] Myalgia/Myositis  [] Muscle cramps      [x] Muscle imbalances [] TMD (TMJ) [x] Myofascial pain & dysfunction     [] Other: __    Chart reviewed for the following:  IDeo DPT, have reviewed the medical history, summary list and precautions/contraindications for [employer].     TIME OUT performed immediately prior to start of procedure:  315 PM  (enter time the timeout was completed)  Deo ZHAO DPT, have performed the following reviews on [employer] prior to the start of the session:      [x] Patient was identified by name and date of birth    [x] Agreement on all muscles being treated was verified   [x] Purpose of dry needling, side effects, possible complications, risks and benefits were explained to the patient   [x] Procedure site(s) verified  [x] Patient was positioned for comfort and draped for privacy  [x] Informed Consent was signed (initial visit) and verified verbally (subsequent visits)  [x] Patient was instructed on the need to report the use of blood thinners and/or immunosuppressant medications  [x] How to respond to possible adverse effects of treatment  [x] Self treatment of post needling soreness: ice, heat (moist heat, heat wraps) and stretching  [x] Opportunity was given to ask any questions, all questions were answered            Treatment:  The following muscles were treated today:    Right:    Left:  L UT/ Mid trap/LS/Scalenes     Patients response to todays treatment:   [x]  LTRs  [x]  Muscle Relaxation  [x]  Pain Relief  []  Decreased Tinnitus  []  Decreased HAs [x]  Post needling soreness [x]  Increased ROM   []  Other:      Other Objective/Functional Measures:   + response in above needled muscles   TrP in the L scalenes reproduced subjective complaints on L radicular pain and into the LS    Pain Level (0-10 scale) post treatment: sore    ASSESSMENT/Changes in Function:   + response in above needled muscles with increased soreness after treatment. Assess effects next session      Patient will continue to benefit from skilled PT services to modify and progress therapeutic interventions, address functional mobility deficits, address ROM deficits, address strength deficits, analyze and address soft tissue restrictions and analyze and modify body mechanics/ergonomics to attain remaining goals.             Progress towards goals / Updated goals:  Reassess next session for PN   PLAN  [x]  Upgrade activities as tolerated     [x]  Continue plan of care  []  Update interventions per flow sheet       []  Discharge due to:_  [x]  Other:reassess next session for PN      Adriana Camara DPT 9/20/2018  210  PM

## 2018-10-02 ENCOUNTER — HOSPITAL ENCOUNTER (OUTPATIENT)
Dept: PHYSICAL THERAPY | Age: 43
Discharge: HOME | End: 2018-10-02
Payer: COMMERCIAL

## 2018-10-02 PROCEDURE — 97012 MECHANICAL TRACTION THERAPY: CPT

## 2018-10-02 PROCEDURE — 97140 MANUAL THERAPY 1/> REGIONS: CPT

## 2018-10-02 PROCEDURE — 97110 THERAPEUTIC EXERCISES: CPT
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
## 2018-10-02 NOTE — PROGRESS NOTES
[address] [facility] AT [facility]  [address] [facility] [address] [place] [address],[address], [place] jenkins, Arnie [place]  Phone [phone]  Fax [phone]  PROGRESS NOTE  Patient Name: Olivia Hernandez : 1975   Treatment/Medical Diagnosis: Cervicalgia [M54.2]   Referral Source: Prachi Godinez MD     Date of Initial Visit: 18 Attended Visits: 8 Missed Visits: 0     SUMMARY OF TREATMENT  Patient has received physical therapy for neck pain  since 18. Treatment has included therapeutic stretching, strengthen, activities, manual/massage, dry needling, traction and modalities as need to assist with decreasing pain and increasing function. CURRENT STATUS  Patient has completed 8 visits  Patient reports overall 50% improvement since beginning therapy  FOTO (Functional Status Summary)  score is 52/100 was 46/100 initial evaluation - indication of overall functional improvement. Upon evaluation patient reported: decreased ability to look up (currently improved by 75%) and to the left effecting ability to check blind sport while driving (currently improved by 75%), decreased ability to turn over in bed (currently improved by 25%), decreased ability to sleep or find comfortable position while sleeping. (currently improved by 25%)  With active cervical retraction - improved motion and range  with a delayed referred  Sharp pain and tingling into the (L) hand/UE  Patient presents with tightness and trigger points to the (L) scalenus and SCM - have performed Graston and Dry Needling to assist with decreasing tightness and improving cervical ROM  - patient presents with equal rotation to (L) and (R) after manual techniques performed       ·  Short Term Goals: To be accomplished in  3  treatments:  1. PT and patient will establish HEP to increase Cervical and Thoracic AROM. MET 10/3/18  2 Patient will report decreased pain to 2/10 or less to allow for increased ability to perform cervical ROM.  MET 8/29/2018      · Long Term Goals: To be accomplished in  4-6  weeks:  1. Patient will increase Cervical and Thoracic spine AROM to WNL with painfree arc of motion to allow for return to PLOF. MET 9/18/2018   2. Patient will report decreased frequency of sharp Left sided neck and Left UE pain to 1 x week to allow for increased function use of Left UE. PROGRESSING TOWARDS STILL A COUPLE TIMES FELT THRU THE WEEK 10/2/18  3. Patient will increase FOTO score from 46 to at least 65/100 to indicate improved functional use of BUE and cervical spine. PROGRESSING TOWARDS 10/2/18  4. Patient will demonstrate I and compliance with HEP to allow for DC to HEP at completion of PT. MET 10/3/18             New Goals to be achieved in 4 weeks:  Long-term Goals: 4 weeks  1. Patient will report decreased frequency of sharp Left sided neck and Left UE pain to 1 x week to allow for increased function use of Left UE      Progress Note (10/2/18): progressing towards; still felt a couple of times a week. Current:     2. Patient will increase FOTO score from 46 to at least 65/100 to indicate improved functional use of BUE and cervical spine      Progress Note (10/2/18): 52/100      Current:          RECOMMENDATIONS  Continue with above POC for 2 to 3 times a week for 4 weeks to achieve above goals    If you have any questions/comments please contact us directly at [phone]. Thank you for allowing us to assist in the care of your patient. Therapist Signature: Hadley Acevedo PTA Date: 10/2/2018     Time: 3:08 PM   NOTE TO PHYSICIAN:  PLEASE COMPLETE THE ORDERS BELOW AND FAX TO   [facility]: [phone].   If you are unable to process this request in 24 hours please contact our office: [phone].    ___ I have read the above report and request that my patient continue as recommended.   ___ I have read the above report and request that my patient continue therapy with the following changes/special instructions:_________________________________________________________   ___ I have read the above report and request that my patient be discharged from therapy.      Physician Signature:        Date:       Time:

## 2018-10-05 ENCOUNTER — HOSPITAL ENCOUNTER (OUTPATIENT)
Dept: PHYSICAL THERAPY | Age: 43
Discharge: HOME | End: 2018-10-05
Payer: COMMERCIAL

## 2018-10-05 PROCEDURE — 97014 ELECTRIC STIMULATION THERAPY: CPT | Performed by: PHYSICAL THERAPIST

## 2018-10-05 PROCEDURE — 97140 MANUAL THERAPY 1/> REGIONS: CPT | Performed by: PHYSICAL THERAPIST

## 2018-10-08 ENCOUNTER — HOSPITAL ENCOUNTER (OUTPATIENT)
Dept: PHYSICAL THERAPY | Age: 43
Discharge: HOME | End: 2018-10-08
Payer: COMMERCIAL

## 2018-10-08 PROCEDURE — 97014 ELECTRIC STIMULATION THERAPY: CPT | Performed by: PHYSICAL THERAPIST

## 2018-10-08 PROCEDURE — 97140 MANUAL THERAPY 1/> REGIONS: CPT | Performed by: PHYSICAL THERAPIST

## 2018-10-08 NOTE — PROGRESS NOTES
PT DAILY TREATMENT NOTE -     Patient Name: Windy Tate  Date:10/8/2018  : 1975  [x]  Patient  Verified  Payor: BLUE CROSS / [facility] / Product Type: PPO /    In time:1135  Out time:1224  Total Treatment Time (min): 49  Visit #: 2 of -    Treatment Area: Cervicalgia [M54.2]    SUBJECTIVE  Pain Level (0-10 scale): 1/10  Any medication changes, allergies to medications, adverse drug reactions, diagnosis change, or new procedure performed?: [x] No    [] Yes (see summary sheet for update)  Subjective functional status/changes:   [] No changes reported  I was really sore after last time. I would stretch my L arm across my chest and it helped.      OBJECTIVE    Modality rationale: decrease pain and increase tissue extensibility to improve the patients ability to improve post IMT soreness    Min Type Additional Details   15 [x] Estim: []Att   []Unatt        []TENS instruct                  []IFC  [x]Premod   []NMES                     []Other:  []w/US   []w/ice   [x]w/heat  Position: prone  Location: L UT     []  Traction: [] Cervical       []Lumbar                       [] Prone          []Supine                       []Intermittent   []Continuous Lbs:  [] before manual  [] after manual    []  Ultrasound: []Continuous   [] Pulsed                           []1MHz   []3MHz Location:  W/cm2:    []  Iontophoresis with dexamethasone         Location: [] Take home patch   [] In clinic    []  Ice     []  heat  []  Ice massage Position:  Location:    []  Laser  []  Other: Position:  Location:    []  Vasopneumatic Device Pressure:       [] lo [] med [] hi   Temperature: [] lo [] med [] hi   [] Skin assessment post-treatment:  []intact []redness- no adverse reaction    []redness - adverse reaction:     34 with warm up  min Manual Therapy: Technique:      [x] S/DTM []IASTM [x]PROM [x] Passive Stretching   [] Graston:  [] GT 1  [] GT 2 [] GT 3 [] GT 4 [] GT 4 [] GT 5  [] GT 6  [] Sweep [] Fan [] Ada  [] Brush   []  Swivel []J- Stroke [] Scoop []IFraming     [x]Manual TPR  [x] TDN (see objective; actual needle insertion time not billed)  []Jt manipulation:Gr I [] II []  III [] IV[] V[]  Treatment/Area:  L UT/ Mid trap/LS/rhomboids   Rationale:      decrease pain, increase ROM, increase tissue extensibility and decrease trigger points to improve patient's ability to improve rotation of the cervical spine with drivng          With   [] TE   [] TA   [] neuro   [] other: Patient Education: [x] Review HEP    [] Progressed/Changed HEP based on:   [] positioning   [] body mechanics   [] transfers   [] heat/ice application    [] Graston Education: Explained the effects and benefits of Graston Technique therapy including potential for post treatment soreness and bruising. [] Other:      Dry Needling Procedure Note    Dry Needle Session Number:  4    Procedure: An intramuscular manual therapy (dry needling) and a neuro-muscular re-education treatment was done to deactivate myofascial trigger points, with a 15/30 gauge solid filament needle, under aseptic technique. Indication(s): [x] Muscle spasms [] Myalgia/Myositis  [] Muscle cramps      [x] Muscle imbalances [] TMD (TMJ) [x] Myofascial pain & dysfunction     [] Other: __    Chart reviewed for the following:  Eryn ZHAO DPT, have reviewed the medical history, summary list and precautions/contraindications for [employer].     TIME OUT performed immediately prior to start of procedure:  1583 PM  (enter time the timeout was completed)  Eryn ZHAO DPT, have performed the following reviews on [employer] prior to the start of the session:      [x] Patient was identified by name and date of birth    [x] Agreement on all muscles being treated was verified   [x] Purpose of dry needling, side effects, possible complications, risks and benefits were explained to the patient   [x] Procedure site(s) verified  [x] Patient was positioned for comfort and draped for privacy  [x] Informed Consent was signed (initial visit) and verified verbally (subsequent visits)  [x] Patient was instructed on the need to report the use of blood thinners and/or immunosuppressant medications  [x] How to respond to possible adverse effects of treatment  [x] Self treatment of post needling soreness: ice, heat (moist heat, heat wraps) and stretching  [x] Opportunity was given to ask any questions, all questions were answered            Treatment:  The following muscles were treated today:    Right:    Left:  L UT/ Mid trap/LS/rhomboids     Patients response to todays treatment:   [x]  LTRs  [x]  Muscle Relaxation  [x]  Pain Relief  []  Decreased Tinnitus  []  Decreased HAs [x]  Post needling soreness [x]  Increased ROM   []  Other:      Other Objective/Functional Measures:   + response in above needled muscles       Pain Level (0-10 scale) post treatment: 2/10    ASSESSMENT/Changes in Function:   + response in above needled muscles with increased soreness after treatment. Assess effects next session      Patient will continue to benefit from skilled PT services to modify and progress therapeutic interventions, address functional mobility deficits, address ROM deficits, address strength deficits, analyze and address soft tissue restrictions and analyze and modify body mechanics/ergonomics to attain remaining goals. Progress towards goals / Updated goals:  Long-term Goals: 4 weeks  1. Patient will report decreased frequency of sharp Left sided neck and Left UE pain to 1 x week to allow for increased function use of Left UE      Progress Note (10/2/18): progressing towards; still felt a couple of times a week. Current:      2.   Patient will increase FOTO score from 46 to at least 65/100 to indicate improved functional use of BUE and cervical spine      Progress Note (10/2/18): 52/100      Current:    PLAN  [x]  Upgrade activities as tolerated     [x] Continue plan of care  []  Update interventions per flow sheet       []  Discharge due to:_  [x]  Other:check goals      Leonardo Calderon DPT 10/8/2018  1247  PM

## 2018-10-09 ENCOUNTER — APPOINTMENT (OUTPATIENT)
Dept: PHYSICAL THERAPY | Age: 43
End: 2018-10-09
Payer: COMMERCIAL

## 2018-10-19 ENCOUNTER — HOSPITAL ENCOUNTER (OUTPATIENT)
Dept: PHYSICAL THERAPY | Age: 43
Discharge: HOME | End: 2018-10-19
Payer: COMMERCIAL

## 2018-10-19 PROCEDURE — 97140 MANUAL THERAPY 1/> REGIONS: CPT

## 2018-10-19 PROCEDURE — 97110 THERAPEUTIC EXERCISES: CPT

## 2018-10-19 NOTE — PROGRESS NOTES
PT DAILY TREATMENT NOTE     Patient Name: Robert Jameson  Date:10/19/2018  : 1975  [x]  Patient  Verified  Payor: Génesis Werner / Plan: [facility] [facility] / Product Type: PPO /    In time: 11:31 Out time:12:30  Total Treatment Time (min): 59  BC/BS 1:1 Time (min) 54  Visit #: 3 of     Treatment Area: Cervicalgia [M54.2]    SUBJECTIVE  Pain Level IN: (0-10 scale):  2/10  Pain Level OUT: (0-10 scale) post treatment: 0/10    Any medication changes, allergies to medications, adverse drug reactions, diagnosis change, or new procedure performed?: [x] No    [] Yes (see summary sheet for update)  Subjective functional status/changes:   [] No changes reported  The long airplane riding from [facility] and back did not help my neck at all  - the only position that I could get comfort with was leaning my head forward    OBJECTIVE    Modality rationale: decrease edema, decrease inflammation, decrease pain and increase tissue extensibility to improve the patients ability to restore normal movement of the neck/head    Min Type Additional Details    [] Estim:  [x]Unatt       []IFC  [x]Premod                        []Other:  []w/ice   []w/heat  Position  Location:     [] Estim: []Att    []TENS instruct  []NMES                    []Other:  []w/US   []w/ice   []w/heat  Position:  Location:     []  Traction: [x] Cervical       []Lumbar                       [] Prone          []Supine                       []Intermittent   []Continuous Lbs:#25/19  [] before manual  [x] after manual    []  Ultrasound: []Continuous   [] Pulsed                           []1MHz   []3MHz W/cm2:  Location:    []  Iontophoresis with dexamethasone         Location: [] Take home patch   [] In clinic    []  Ice     [x]  heat  []  Ice massage  []  Laser   []  Anodyne Position: in semi-reclined   Location: to (B) thoracic/cervical     []  Laser with stim  []  Other:  Position:  Location:    []  Vasopneumatic Device Pressure:       [] lo [] med [] hi   Temperature: [] lo [] med [] hi   [] Skin assessment post-treatment:  []intact []redness- no adverse reaction    []redness - adverse reaction:       29/24 min Therapeutic Exercise:  [x] See flow sheet :  Added CC - kneeling lat pull downs - in front and behind   Prone over swiss ball - letter M,T and Lestine Leyland - matt planks, and walk overs   Upright rows  Triple threat with t-band  Front shoulder raises with t-band loop   Rationale: increase ROM and increase strength to improve the patients ability to restore normal cervical ROM    30 min Manual Therapy: Technique:     [x] S/DTM [x]IASTM [x]PROM [] Passive Stretching   [x] Graston:  [] GT 1  [x] GT 2 [] GT 3 [x] GT 4 [] GT 4 [] GT 5  [] GT 6  [] Sweep [] Fan [x] [place]  [x] Brush   []  Swivel [x]J- Stroke [] Scoop []IFraming     [x]Manual TPR  [] TDN (see objective; actual needle insertion time not billed)  [x]Jt manipulation:Gr I [x] II [x]  III [] IV[] V[]  Treatment/Area: performed PA mobs to C-T junction   Correct (B) 1st rib with METS  Passive scalenus and SCM stretch   Graston to the (L) SCM and scalenus with passive and active stretching of head and neck to release trigger points and tightness'   K-tape to inhibit UT from firing and to activate the low traps/rhomboids/lats           Rationale:      decrease pain, increase ROM, increase tissue extensibility and decrease trigger points to improve patient's ability to rotate head and perform normal cervical motion in all planes without pain shooting into the (L) arm             [] TE   [] TA   [] neuro   [] other: Patient Education: [x] Review HEP    [] Progressed/Changed HEP based on:   [] positioning   [] body mechanics   [] transfers   [] heat/ice application    [x] Graston Education: Explained the effects and benefits of Graston Technique therapy including potential for post treatment soreness and bruising.   [] Other:            Objective/Functional Measures with Therapist Assessment Noted with Response to Therapy Session:  Added above exercises to progress with increasing strength to low and mid traps  Attempted K-tape to inhibit UT from firing and to activate the low traps/rhomboids/lats  Presented with tightness to the (L) scalenus/SCM and elevated 1st rib and rotation lesion to the CT junction to the (R)    GOALS     1.  Patient will report decreased frequency of sharp Left sided neck and Left UE pain to 1 x week to allow for increased function use of Left UE      Progress Note (10/2/18): progressing towards; still felt a couple of times a week.        Current:increased secondary to long plane ride over the last week - 10/19/18         ASSESSMENT/Changes in Function:     Patient will continue to benefit from skilled PT services to modify and progress therapeutic interventions, address functional mobility deficits, address ROM deficits, address strength deficits, analyze and address soft tissue restrictions and assess and modify postural abnormalities to attain remaining goals.      [x]  See Plan of Care  []  See progress note/recertification  []  See Discharge Summary         Progress towards goals / Updated goals:  Long-term Goals: 4 weeks  1.  Patient will report decreased frequency of sharp Left sided neck and Left UE pain to 1 x week to allow for increased function use of Left UE      Progress Note (10/2/18): progressing towards; still felt a couple of times a week.        Current:increased secondary to long plane ride over the last week - 10/19/18      2.  Patient will increase FOTO score from 46 to at least 65/100 to indicate improved functional use of BUE and cervical spine      Progress Note (10/2/18): 52/100      Current:         PLAN  [x]  Upgrade activities as tolerated     [x]  Continue plan of care  []  Update interventions per flow sheet       []  Discharge due to:_  []  Other       Norval Minors, PTA 10/19/2018  2:54 PM    Future Appointments   Date Time Provider Ayaz Kelly 10/22/2018  9:15 AM Martha Mayorga PTA [facility]   10/25/2018  2:15 PM FELICIA RodriguezT [facility]

## 2018-10-22 ENCOUNTER — APPOINTMENT (OUTPATIENT)
Dept: PHYSICAL THERAPY | Age: 43
End: 2018-10-22
Payer: COMMERCIAL

## 2018-10-25 ENCOUNTER — HOSPITAL ENCOUNTER (OUTPATIENT)
Dept: PHYSICAL THERAPY | Age: 43
Discharge: HOME | End: 2018-10-25
Payer: COMMERCIAL

## 2018-10-25 PROCEDURE — 97014 ELECTRIC STIMULATION THERAPY: CPT | Performed by: PHYSICAL THERAPIST

## 2018-10-25 PROCEDURE — 97140 MANUAL THERAPY 1/> REGIONS: CPT | Performed by: PHYSICAL THERAPIST

## 2018-10-25 NOTE — PROGRESS NOTES
PT DAILY TREATMENT NOTE -     Patient Name: Felisa Bhagat  Date:10/25/2018  : 1975  [x]  Patient  Verified  Payor: BLUE CROSS / Plan: [address] [place] [address] [place] / Product Type: PPO /    In time:115  Out time:215  Total Treatment Time (min): 60  Visit #: 4 of -    Treatment Area: Cervicalgia [M54.2]    SUBJECTIVE  Pain Level (0-10 scale): 210  Any medication changes, allergies to medications, adverse drug reactions, diagnosis change, or new procedure performed?: [x] No    [] Yes (see summary sheet for update)  Subjective functional status/changes:   [] No changes reported  That one spot is still bothering over my L levator or rhomboids    OBJECTIVE    Modality rationale: decrease pain and increase tissue extensibility to improve the patients ability to improve post IMT soreness    Min Type Additional Details   15 [x] Estim: []Att   []Unatt        []TENS instruct                  []IFC  [x]Premod   []NMES                     []Other:  []w/US   []w/ice   [x]w/heat  Position: prone  Location: L UT     []  Traction: [] Cervical       []Lumbar                       [] Prone          []Supine                       []Intermittent   []Continuous Lbs:  [] before manual  [] after manual    []  Ultrasound: []Continuous   [] Pulsed                           []1MHz   []3MHz Location:  W/cm2:    []  Iontophoresis with dexamethasone         Location: [] Take home patch   [] In clinic    []  Ice     []  heat  []  Ice massage Position:  Location:    []  Laser  []  Other: Position:  Location:    []  Vasopneumatic Device Pressure:       [] lo [] med [] hi   Temperature: [] lo [] med [] hi   [] Skin assessment post-treatment:  []intact []redness- no adverse reaction    []redness - adverse reaction:     45 min Manual Therapy: Technique:      [x] S/DTM []IASTM [x]PROM [x] Passive Stretching   [] Graston:  [] GT 1  [] GT 2 [] GT 3 [] GT 4 [] GT 4 [] GT 5  [] GT 6  [] Sweep [] Fan [] [place]  [] Brush   []  Swivel []J- Stroke [] Scoop []IFraming     [x]Manual TPR  [x] TDN (see objective; actual needle insertion time not billed)  []Jt manipulation:Gr I [] II []  III [] IV[] V[]  Treatment/Area:  L UT/ Mid trap/LS/rhomboids   Rationale:      decrease pain, increase ROM, increase tissue extensibility and decrease trigger points to improve patient's ability to improve rotation of the cervical spine with drivng          With   [] TE   [] TA   [] neuro   [] other: Patient Education: [x] Review HEP    [] Progressed/Changed HEP based on:   [] positioning   [] body mechanics   [] transfers   [] heat/ice application    [] Graston Education: Explained the effects and benefits of Graston Technique therapy including potential for post treatment soreness and bruising. [] Other:      Dry Needling Procedure Note    Dry Needle Session Number:  5    Procedure: An intramuscular manual therapy (dry needling) and a neuro-muscular re-education treatment was done to deactivate myofascial trigger points, with a 15/30 gauge solid filament needle, under aseptic technique. Indication(s): [x] Muscle spasms [] Myalgia/Myositis  [] Muscle cramps      [x] Muscle imbalances [] TMD (TMJ) [x] Myofascial pain & dysfunction     [] Other: __    Chart reviewed for the following:  IKevin DPT, have reviewed the medical history, summary list and precautions/contraindications for [employer].     TIME OUT performed immediately prior to start of procedure:  200PM  (enter time the timeout was completed)  Kevin ZHAO DPT, have performed the following reviews on [employer] prior to the start of the session:      [x] Patient was identified by name and date of birth    [x] Agreement on all muscles being treated was verified   [x] Purpose of dry needling, side effects, possible complications, risks and benefits were explained to the patient   [x] Procedure site(s) verified  [x] Patient was positioned for comfort and draped for privacy  [x] Informed Consent was signed (initial visit) and verified verbally (subsequent visits)  [x] Patient was instructed on the need to report the use of blood thinners and/or immunosuppressant medications  [x] How to respond to possible adverse effects of treatment  [x] Self treatment of post needling soreness: ice, heat (moist heat, heat wraps) and stretching  [x] Opportunity was given to ask any questions, all questions were answered            Treatment:  The following muscles were treated today:    Right:    Left:  L UT/ Mid trap/LS/rhomboids     Patients response to todays treatment:   [x]  LTRs  [x]  Muscle Relaxation  [x]  Pain Relief  []  Decreased Tinnitus  []  Decreased HAs [x]  Post needling soreness [x]  Increased ROM   []  Other:      Other Objective/Functional Measures:   + response in above needled muscles       Pain Level (0-10 scale) post treatment: 2/10    ASSESSMENT/Changes in Function:   + response in above needled muscles with increased soreness after treatment. Assess effects next session      Patient will continue to benefit from skilled PT services to modify and progress therapeutic interventions, address functional mobility deficits, address ROM deficits, address strength deficits, analyze and address soft tissue restrictions and analyze and modify body mechanics/ergonomics to attain remaining goals. Progress towards goals / Updated goals:  Long-term Goals: 4 weeks  1. Patient will report decreased frequency of sharp Left sided neck and Left UE pain to 1 x week to allow for increased function use of Left UE      Progress Note (10/2/18): progressing towards; still felt a couple of times a week. Current:      2.   Patient will increase FOTO score from 46 to at least 65/100 to indicate improved functional use of BUE and cervical spine      Progress Note (10/2/18): 52/100      Current:    PLAN  [x]  Upgrade activities as tolerated     [x]  Continue plan of care  []  Update interventions per flow sheet       []  Discharge due to:_  [x]  Other:check goals for reassessment next session    Lilo Contreras DPT 10/25/2018  237  PM

## 2018-11-12 ENCOUNTER — APPOINTMENT (OUTPATIENT)
Dept: PHYSICAL THERAPY | Age: 43
End: 2018-11-12
Payer: COMMERCIAL

## 2018-11-15 ENCOUNTER — APPOINTMENT (OUTPATIENT)
Dept: PHYSICAL THERAPY | Age: 43
End: 2018-11-15
Payer: COMMERCIAL

## 2018-11-19 ENCOUNTER — APPOINTMENT (OUTPATIENT)
Dept: PHYSICAL THERAPY | Age: 43
End: 2018-11-19
Payer: COMMERCIAL

## 2018-11-23 ENCOUNTER — APPOINTMENT (OUTPATIENT)
Dept: PHYSICAL THERAPY | Age: 43
End: 2018-11-23
Payer: COMMERCIAL

## 2018-11-26 ENCOUNTER — APPOINTMENT (OUTPATIENT)
Dept: PHYSICAL THERAPY | Age: 43
End: 2018-11-26
Payer: COMMERCIAL

## 2018-11-29 ENCOUNTER — HOSPITAL ENCOUNTER (OUTPATIENT)
Dept: PHYSICAL THERAPY | Age: 43
Discharge: HOME | End: 2018-11-29
Payer: COMMERCIAL

## 2018-11-29 PROCEDURE — 97530 THERAPEUTIC ACTIVITIES: CPT

## 2018-11-29 NOTE — PROGRESS NOTES
[address] [facility]  [address] [place] [address],[address], [place], [address]  Phone [phone]  Fax [phone]  PROGRESS NOTE  Patient Name: Kamari Sorensen : 1975   Treatment/Medical Diagnosis: Cervicalgia [M54.2]   Referral Source: Micah Brown MD     Date of Initial Visit: 18 Attended Visits: 14 Missed Visits: 3     SUMMARY OF TREATMENT  Patient has received physical therapy for neck pain since 18. Treatment has included therapeutic stretching, strengthen, activities, manual/massage and modalities as need to assist with decreasing pain and increasing function. CURRENT STATUS  Patient has completed 13 visits  Patient reports overall 90% improvement since beginning therapy  FOTO (Functional Status Summary)  score is 70/100 was 46/100 initial evaluation - indication of overall functional improvement. Cervical ROM currently is (R) ROT 75 degrees with slight tingling down the left arm. (L) ROT 67 degrees with tightness and tingling down left arm. Extension 42 degrees, pain and tingling was worse with this motion. Flexion 50 degrees no tingling. (B) lateral flexion 40 degrees. was full PROM but only in supine, painful cervical ROM in sitting on initial evaluation. Shoulder flexion (B) 5/5 Abduction (L) 3-/5 (R) 5/5 please advise if additional studies/imaging would be warranted secondary to continuous radiating pain since 2018 into (L) shoulder/arm when patient performs (B) ROT and extension. Patient's remaining chief c/o is a sharp pain in left side of neck every once in a while.        Progress towards goals / Updated goals:  Long-term Goals: 4 weeks  1.  Patient will report decreased frequency of sharp Left sided neck and Left UE pain to 1 x week to allow for increased function use of Left UE      Progress Note (18): progressing towards; still felt a couple of times a week, but 90% better.       Current:      2.  Patient will increase FOTO score from 46 to at least 65/100 to indicate improved functional use of BUE and cervical spine. MET 11/29/18      Progress Note (11/29/18): 70/100      Current:       New Goals to be achieved in 4 weeks:  Long-term Goals: 4 weeks     1.  Patient will report decreased frequency of sharp Left sided neck and Left UE pain to 1 x week to allow for increased function use of Left UE      Progress Note (11/29/18): progressing towards; still felt a couple of times a week, but 90% better.       Current:      RECOMMENDATIONS  Continue with above POC for 2 to 3 times a week for 4 weeks to achieve above goals    If you have any questions/comments please contact us directly at [phone]. Thank you for allowing us to assist in the care of your patient. Therapist Signature: Kwadwo JOHNSTON Date: 11/29/2018   Therapist   Signature:  KASSIE Hernandez Time: 2:30 PM   NOTE TO PHYSICIAN:  PLEASE COMPLETE THE ORDERS BELOW AND FAX TO   [facility]: [phone]. If you are unable to process this request in 24 hours please contact our office: [phone].    ___ I have read the above report and request that my patient continue as recommended.   ___ I have read the above report and request that my patient continue therapy with the following changes/special instructions:_________________________________________________________   ___ I have read the above report and request that my patient be discharged from therapy.      Physician Signature:        Date:       Time:

## 2018-11-29 NOTE — PROGRESS NOTES
PT DAILY TREATMENT NOTE     Patient Name: Yenni Lam  Date:2018  : 1975  [x]  Patient  Verified  Payor: BLUE CROSS / Plan: [facility] / Product Type: PPO /    In time:2:27  Out time:3:17  Total Treatment Time (min): 50  BC/BS 1:1 Time (min) 15  Visit #: 5 of     Treatment Area: Cervicalgia [M54.2]    SUBJECTIVE  Pain Level IN: (0-10 scale):  1/10 more pain in the (L) shoulder   Pain Level OUT: (0-10 scale) post treatment: 1/10    Any medication changes, allergies to medications, adverse drug reactions, diagnosis change, or new procedure performed?: [x] No    [] Yes (see summary sheet for update)  Subjective functional status/changes:   [] No changes reported  My neck is doing really good, but now I am having pain in my shoulder when I raise it a certain way.      OBJECTIVE    Modality rationale: decrease edema, decrease inflammation, decrease pain and increase tissue extensibility to improve the patients ability to restore normal movement of the neck/head    Min Type Additional Details    [] Estim:  [x]Unatt       []IFC  [x]Premod                        []Other:  []w/ice   []w/heat  Position  Location:     [] Estim: []Att    []TENS instruct  []NMES                    []Other:  []w/US   []w/ice   []w/heat  Position:  Location:     []  Traction: [x] Cervical       []Lumbar                       [] Prone          []Supine                       []Intermittent   []Continuous Lbs:#25/19  [] before manual  [x] after manual    []  Ultrasound: []Continuous   [] Pulsed                           []1MHz   []3MHz W/cm2:  Location:    []  Iontophoresis with dexamethasone         Location: [] Take home patch   [] In clinic    []  Ice     [x]  heat  []  Ice massage  []  Laser   []  Anodyne Position: in semi-reclined   Location: to (B) thoracic/cervical     []  Laser with stim  []  Other:  Position:  Location:    []  Vasopneumatic Device Pressure:       [] lo [] med [] hi   Temperature: [] lo [] med [] hi   [] Skin assessment post-treatment:  []intact []redness- no adverse reaction    []redness - adverse reaction:       x min Therapeutic Exercise:  [x] See flow sheet :     Rationale: increase ROM and increase strength to improve the patients ability to restore normal cervical ROM    x min Manual Therapy: Technique:     [x] S/DTM [x]IASTM [x]PROM [] Passive Stretching   [x] Graston:  [] GT 1  [x] GT 2 [] GT 3 [x] GT 4 [] GT 4 [] GT 5  [] GT 6  [] Sweep [] Fan [x] Jefferson  [x] Brush   []  Swivel [x]J- Stroke [] Scoop []IFraming     [x]Manual TPR  [] TDN (see objective; actual needle insertion time not billed)  [x]Jt manipulation:Gr I [x] II [x]  III [] IV[] V[]  Treatment/Area:        Rationale:      decrease pain, increase ROM, increase tissue extensibility and decrease trigger points to improve patient's ability to rotate head and perform normal cervical motion in all planes without pain shooting into the (L) arm         50 min    [] TE   [x] TA   [] neuro   [] other: Patient Education: [] Review HEP    [] Progressed/Changed HEP based on:   [] positioning   [] body mechanics   [] transfers   [] heat/ice application    [] Graston Education: Explained the effects and benefits of Graston Technique therapy including potential for post treatment soreness and bruising. [x] Other:  Reassessment           Objective/Functional Measures with Therapist Assessment Noted with Response to Therapy Session:  Cervical:  (R) ROT- 0-75 with tingling down the left arm   (L) ROT- 0-67 with tightness and tingling down the left arm  Ext- 0-40 with pain and tingling down left arm   Flex-50 no tingling   (B) lateral flexion 40  Shoulder:   Flexion (B) 5/5  Abduction (R) 5/5 (L) 3-/5 with pain. ASSESSMENT/Changes in Function:   Patient reports that there is radicular pain into (L) arm/hand when rotating cervical spine (B) and with extension. Patient reports that there is pain in (L) shoulder when performing abduction.  He also reports that he is going to see MD for referral to obtain MRI. Therapist held exercises due to the pain in shoulder. Patient will continue to benefit from skilled PT services to modify and progress therapeutic interventions, address functional mobility deficits, address ROM deficits, address strength deficits, analyze and address soft tissue restrictions and assess and modify postural abnormalities to attain remaining goals. []  See Plan of Care  [x]  See progress note/recertification  []  See Discharge Summary         Progress towards goals / Updated goals:  Long-term Goals: 4 weeks  1.  Patient will report decreased frequency of sharp Left sided neck and Left UE pain to 1 x week to allow for increased function use of Left UE      Progress Note (11/29/18): progressing towards; still felt a couple of times a week, but 90% better.       Current:      2.  Patient will increase FOTO score from 46 to at least 65/100 to indicate improved functional use of BUE and cervical spine. MET 11/29/18      Progress Note (11/29/18): 70/100            PLAN  []  Upgrade activities as tolerated     [x]  Continue plan of care  []  Update interventions per flow sheet       []  Discharge due to:_  [x]  Other   Please refer to progress note. Ikki Viji 11/29/2018  2:54 PM    No future appointments.

## 2019-02-04 NOTE — PROGRESS NOTES
[address] [facility] AT [facility]  [address], [place], [place]  Phone [phone]  Fax  SUMMARY  Patient Name: Storm Jackson : 1975   Treatment/Medical Diagnosis: Cervicalgia [M54.2]   Referral Source: Joneen Severin, MD     Date of Initial Visit: 2018 Attended Visits: 14 Missed Visits:        Summary of Care: Thank you for referring this pleasant patient. Giuseppe Begum has completed 14 sessions. Please refer to progress report written on 2018 for status of patient at that time on last visit. Stated will keep the chart open for an additional month if patient required or needed additional PT secondary to not being to management symptoms with HEP  Patient did not contact office for any additional need or follow up visits -   Will discharge patient at this time and if patient contact office after today will advise patient that is any additional follow up or recommendation is needed to return to referring physician. Reason for Discharge:  [] The patient was non-compliant with attendance. [x] The patient could not be contacted to schedule further therapy sessions. [] The patient has been discharged based on the orders of the referring physician.  [] The patient has requested to be discharged due to:   [] Patient has met all goals or max benefit has been achieved      If you have any questions/comments please contact us directly at [phone]. Thank you for allowing us to assist in the care of your patient.     Therapist Signature: Lee Castro PTA Date: 2019     Time: 2:10 PM

## 2022-04-12 ENCOUNTER — HOSPITAL ENCOUNTER (OUTPATIENT)
Dept: PHYSICAL THERAPY | Age: 47
Discharge: HOME | End: 2022-04-12
Payer: COMMERCIAL

## 2022-04-12 PROCEDURE — 97110 THERAPEUTIC EXERCISES: CPT | Performed by: PHYSICAL THERAPIST

## 2022-04-12 PROCEDURE — 97014 ELECTRIC STIMULATION THERAPY: CPT | Performed by: PHYSICAL THERAPIST

## 2022-04-12 PROCEDURE — 97140 MANUAL THERAPY 1/> REGIONS: CPT | Performed by: PHYSICAL THERAPIST

## 2022-04-12 PROCEDURE — 97162 PT EVAL MOD COMPLEX 30 MIN: CPT | Performed by: PHYSICAL THERAPIST

## 2022-04-12 NOTE — PROGRESS NOTES
PT DAILY TREATMENT NOTE     Patient Name: John Hong  Date:2022   : 1975  [x]  Patient  Verified  Payor: BLUE CROSS [facility] / Product Type: [facility] /    In time:838  Out time:925  Total Treatment Time (min): 52  Visit #: 1 of     Medicare/BCBS Only   Total Timed Codes (min):  25 1:1 Treatment Time:  52       Treatment Area: Left knee pain [M25.562]    SUBJECTIVE  Pain Level (0-10 scale): 1/10  Any medication changes, allergies to medications, adverse drug reactions, diagnosis change, or new procedure performed?: [x] No    [] Yes (see summary sheet for update)  Subjective functional status/changes:   [] No changes reported  Pt is a 42 year male sp L meniscus repair on 3/31/22. Pt reports  pain with certain movements. Functional limitations are consistent with recent surgical interventions. He is non-weightbearing for 5 weeks. Pt is on the [employer] [facility]. He reports that he feels that injured as he started running 3 times a week for 2 miles at a time. He is on limited duty at work. Negative for red flags. FOTO:  38/100.      OBJECTIVE    Modality rationale: decrease edema, decrease inflammation and decrease pain to improve the patients ability to improve post session soreness    Min Type Additional Details   15 [x] Estim:  [x]Unatt       [x]IFC  []Premod                        []Other:  [x]w/ice   []w/heat  Position: supine   Location: L knee     [] Estim: []Att    []TENS instruct  []NMES                    []Other:  []w/US   []w/ice   []w/heat  Position:  Location:    []  Traction: [] Cervical       []Lumbar                       [] Prone          []Supine                       []Intermittent   []Continuous Lbs:  [] before manual  [] after manual    []  Ultrasound: []Continuous   [] Pulsed                           []1MHz   []3MHz W/cm2:  Location:    []  Iontophoresis with dexamethasone         Location: [] Take home patch   [] In clinic    []  Ice     []  heat  [] Ice massage  []  Laser   []  Anodyne Position:  Location:    []  Laser with stim  []  Other:  Position:  Location:    []  Vasopneumatic Device  Pre-treatment girth:   Post-treatment girth:   Measured at landmark location:  Pressure:       [] lo [] med [] hi   Temperature: [] lo [] med [] hi   [] Skin assessment post-treatment:  []intact []redness- no adverse reaction    []redness - adverse reaction:   Vasopnuematic compression justification:  Per bilateral girth measures taken and listed above the edema is considered significant and having an impact on the patient's strength, balance, gait, transfers and ADLs    12 min [x]Eval                  []Re-Eval       17 min Therapeutic Exercise:  [] See flow sheet : established/demonstrated/educated HEP   Rationale: increase ROM and increase strength to improve the patients ability to prepare for weight-bearing phase of rehab      8 min Manual Therapy:  Edema massage to the L knee, gentle PROM to the L knee in supine   The manual therapy interventions were performed at a separate and distinct time from the therapeutic activities interventions. Rationale: decrease pain, increase ROM and increase tissue extensibility to prevent secondary complications in prep for functional transfers and gait          With   [] TE   [] TA   [] neuro   [] other: Patient Education: [x] Review HEP    [] Progressed/Changed HEP based on:   [] positioning   [] body mechanics   [] transfers   [] heat/ice application    [] other:      Other Objective/Functional Measures:   Upon evaluation, pt ambulates with B axillary crutches demonstrating 3 way gait pattern and NWB on the L LE. Increased swelling in the L knee with visual inspection compared to the R. Incisions are healing over the medial/lateral aspect of the L patella, no signs of infection noted. AROM of the L knee are -5 to 0 to 80 deg. Noted quad lag with SLR on the L. Fall Risk Assessment: Does the patient have a fear of falling?  Yes because of crutches If yes, what fall risk assessment was performed? Gait assessment, unable to assess static balance and fall risk as he is non-weight-bearing on the L LE.      Pain Level (0-10 scale) post treatment: 0/10    ASSESSMENT/Changes in Function:   [x]  See Plan of Care  []  See progress note/recertification  []  See Discharge Summary         Progress towards goals / Updated goals:  PER POC     PLAN  []  Upgrade activities as tolerated     [x]  Continue plan of care  []  Update interventions per flow sheet       []  Discharge due to:_  [x]  Other: 2-3x/week for 4 weeks    Justification for Eval Code Complexity:  Patient History : chronicity, 2nd surgery on the L knee Job duties  Examination see exam   Clinical Presentation: evolving  Clinical Decision Making : FOTO : 45 /100       Autumn Silvestre DPT 4/12/2022  148 PM    Future Appointments   Date Time Provider Ayaz Kelly   4/14/2022  9:15 AM Mary Pino PTA [facility]

## 2022-04-12 NOTE — PROGRESS NOTES
[address] [facility] AT [facility]  [address] [place] [address], [place], [place]  Phone [phone]  Fax [phone] / [address] [place] [address] PHYSICAL THERAPY SERVICES  Patient Name: Alysa Hager : 1975   Medical   Diagnosis: Left knee pain [M25.562] Treatment Diagnosis: L knee pain   Onset Date: 3/31/22     Referral Source: Tolu Constantino MD [facility] [place]): 2022   Prior Hospitalization: See medical history Provider #: [id number]   Prior Level of Function: IND, 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   =================================================================================  Assessment / key information:  Pt is a 42 year male sp L meniscus repair on 3/31/22. Pt reports  pain with certain movements. Functional limitations are consistent with recent surgical interventions. He is non-weightbearing for 5 weeks. Pt is on the [facility]. He reports that he feels that injured as he started running 3 times a week for 2 miles at a time. He is on limited duty at work. Negative for red flags. FOTO:  38/100. Upon evaluation, pt ambulates with B axillary crutches demonstrating 3 way gait pattern and NWB on the L LE. Increased swelling in the L knee with visual inspection compared to the R. Incisions are healing over the medial/lateral aspect of the L patella, no signs of infection noted. AROM of the L knee are -5 to 0 to 80 deg. Noted quad lag with SLR on the L.   Pt would benefit from a course of skilled PT to address above deficits to return to PLOF symptom free.   =================================================================================  Eval Complexity: History: HIGH Complexity :3+ comorbidities / personal factors will impact the outcome/ POC Exam:HIGH Complexity : 4+ Standardized tests and measures addressing body structure, function, activity limitation and / or participation in recreation  Presentation: MEDIUM Complexity : Evolving with changing characteristics  Clinical Decision Making:MEDIUM Complexity : FOTO score of 26-74Overall Complexity:MEDIUM  Problem List: pain affecting function, decrease ROM, decrease strength, edema affecting function, impaired gait/ balance, decrease ADL/ functional abilitiies, decrease activity tolerance, decrease flexibility/ joint mobility and decrease transfer abilities   Treatment Plan may include any combination of the following: Therapeutic exercise, Therapeutic activities, Neuromuscular re-education, Physical agent/modality, Gait/balance training, Manual therapy, Patient education, Self Care training, Functional mobility training, Home safety training and Stair training  Patient / Family readiness to learn indicated by: asking questions and trying to perform skills  Persons(s) to be included in education: patient (P)  Barriers to Learning/Limitations: None  Measures taken:    Patient Goal (s): Less pain, less swelling, full ROM, and return to my work   Patient self reported health status: good  Rehabilitation Potential: good   Short Term Goals: To be accomplished in  2  weeks:  1. Pt will be IND and compliant with HEP for self-management of symptoms.  Long Term Goals: To be accomplished in  4  weeks:  1. Pt will improve L knee AROM 0-115 deg to prepare for next phase of rehab. 2. Pt will be able to perform SLR without quad lag to improve functional transfers. 3. Pt will be able to demo 20% WB to progress to next phase of gait when cleared by MD.  4. Pt will improve FOTO score to at least 74/100 asa functional indicator of improved mobility.    Frequency / Duration:   Patient to be seen  2-3  times per week for 4  weeks: (All LTG as above will be assessed and updated every 10 visits or 30 days and progressed as needed)    Patient / Caregiver education and instruction: exercises  Therapist Signature: Dell Roa DPT Date: 0/30/4276   Certification Period: NA Time: 1:49 PM   ===========================================================================================  I certify that the above Physical Therapy Services are being furnished while the patient is under my care. I agree with the treatment plan and certify that this therapy is necessary. Physician Signature:        Date:       Time:     Please sign and return to [facility] [place] or you may fax the signed copy to [phone]. Thank you.   Hitesh Camacho MD

## 2022-04-14 ENCOUNTER — HOSPITAL ENCOUNTER (OUTPATIENT)
Dept: PHYSICAL THERAPY | Age: 47
Discharge: HOME | End: 2022-04-14
Payer: COMMERCIAL

## 2022-04-14 PROCEDURE — 97140 MANUAL THERAPY 1/> REGIONS: CPT

## 2022-04-14 PROCEDURE — 97014 ELECTRIC STIMULATION THERAPY: CPT

## 2022-04-14 PROCEDURE — 97110 THERAPEUTIC EXERCISES: CPT

## 2022-04-14 NOTE — PROGRESS NOTES
PT DAILY TREATMENT NOTE - [facility]     Patient Name: Jaspal Aguilar  Date:2022  : 1975  [x]  Patient  Verified  Payor: ANDRES SUAREZ / Plan: Ricardo Lucero / Product Type: PPO /    In time: 9:17  Out time:10:17  Total Treatment Time (min): 60  Total Timed Codes (min): 45  1:1 Treatment Time ( only): 40   Visit #: 2 of     Treatment Area: Left knee pain [M25.562]    SUBJECTIVE  Pain Level IN:(0-10 scale): 0/10  Pain Level OUT: (0-10 scale) post treatment: 0/10    Any medication changes, allergies to medications, adverse drug reactions, diagnosis change, or new procedure performed?: [x] No    [] Yes (see summary sheet for update)  Subjective functional status/changes:   [] No changes reported  I am doing okay     OBJECTIVE    Modalities Rationale:     decrease edema, decrease inflammation and decrease pain to improve patient's ability to ambulate with normal gait pattern  15 min [x] Estim, type/location: To (L) knee                                      []  att     [x]  unatt     []  w/US     [x]  w/ice    []  w/heat    min []  Mechanical Traction: type/lbs                   []  pro   []  sup   []  int   []  cont    []  before manual    []  after manual    min []  Ultrasound, settings/location:      min []  Iontophoresis w/ dexamethasone, location:                                               []  take home patch       []  in clinic    min []  Ice     []  Heat    location/position:     min []  Vasopneumatic Device, press/temp:     min []  Other:    [] Skin assessment post-treatment (if applicable):    []  intact    []  redness- no adverse reaction     []redness - adverse reaction:        25 min Therapeutic Exercise:  [x] See flow sheet : first follow up visit since initial evaluation - initiated POC per flow sheet    Rationale: increase ROM and increase strength to improve the patients ability to restore patient to normal ADLs     min Therapeutic Activity:  []  See flow sheet : Rationale: increase ROM and increase strength  to improve the patients ability to ambulate with normal gait pattern when patient is full weight bearing       min Neuromuscular Re-education:  []  See flow sheet :   Rationale: improve coordination, improve balance and increase proprioception  to improve the patients ability to achieve all goals stated below     15 min Manual Therapy:  Sitting edge of bed and in supine PROM to increase knee flexion and DTM to lateral quad   Rationale: decrease pain, increase ROM, increase tissue extensibility, decrease edema  and decrease trigger points to (L) knee     5 min Gait Training: crutches with 20% WBing - used scale for patient to apply 20% of current weight approx 37 pounds - for patient to feel the correct amount of weight that he is allow to place thru the (L) LE   Rationale: With   [] TE   [] TA   [] neuro   [] other: Patient Education: [x] Review HEP    [] Progressed/Changed HEP based on:   [] positioning   [] body mechanics   [] transfers   [] heat/ice application    [] other:      Objective/Functional Measures with Therapist Assessment Noted with Response to Therapy Session:   first follow up visit since initial evaluation -   initiated POC per flow sheet   used scale for patient to apply 20% of current weight approx 37 pounds - for patient to feel the correct amount of weight that he is allow to place thru the (L) LE  EOB with knee flexion 100 with some tightness felt in the knee joint at end range         ASSESSMENT/Changes in Function:   Patient was able to rock on the bike today with assistance to increase knee flexion, presented with painful trigger point to the distal lateral quad that responded well to DTM to area.  difficulty with  eliciting a proper quad set into towel and using the VMO vs compensation with hip and foot/ankle, that did response with tapping to the VMO while performing quad set to obtain that correct quad contraction    Patient will continue to benefit from skilled PT services to modify and progress therapeutic interventions, address functional mobility deficits, address ROM deficits, address strength deficits, analyze and address soft tissue restrictions, analyze and cue movement patterns and assess and modify postural abnormalities to attain remaining goals. [x]  See Plan of Care  []  See progress note/recertification  []  See Discharge Summary         Progress towards goals / Updated goals: · Short Term Goals: To be accomplished in  2  weeks:  1. Pt will be IND and compliant with HEP for self-management of symptoms. · Long Term Goals: To be accomplished in  4  weeks:  1. Pt will improve L knee AROM 0-115 deg to prepare for next phase of rehab. 2. Pt will be able to perform SLR without quad lag to improve functional transfers. 3. Pt will be able to demo 20% WB to progress to next phase of gait when cleared by MD.  4. Pt will improve FOTO score to at least 74/100 asa functional indicator of improved mobility.      PLAN  [x]  Upgrade activities as tolerated     [x]  Continue plan of care  []  Update interventions per flow sheet       []  Discharge due to:_  []  Other:_      Donny Norton PTA 4/14/2022  12:16 PM    Future Appointments   Date Time Provider Ayaz Kelly   4/19/2022  9:15 AM Tawanda Leo DPT [facility]   4/22/2022 10:00 AM [facility] [place] [facility]   4/26/2022  9:15 AM Tawanda Leo DPT [facility]   4/28/2022 10:45 AM Leobardo Gil PTA [facility]   5/3/2022  9:15 AM Leobardo Gil PTA [facility]   5/6/2022  8:30 AM Tawanda Leo DPT [facility]   5/12/2022  9:15 AM Leobardo Gil PTA [facility]

## 2022-04-19 ENCOUNTER — HOSPITAL ENCOUNTER (OUTPATIENT)
Dept: PHYSICAL THERAPY | Age: 47
Discharge: HOME | End: 2022-04-19
Payer: COMMERCIAL

## 2022-04-19 PROCEDURE — 97110 THERAPEUTIC EXERCISES: CPT

## 2022-04-19 PROCEDURE — 97140 MANUAL THERAPY 1/> REGIONS: CPT

## 2022-04-19 PROCEDURE — 97014 ELECTRIC STIMULATION THERAPY: CPT

## 2022-04-19 NOTE — PROGRESS NOTES
PT DAILY TREATMENT NOTE - [facility]     Patient Name: Yessica Coreas  Date:2022  : 1975  [x]  Patient  Verified  Payor: BLUE CROSS / Plan: [facility] / Product Type: PPO /    In time: 9:58  Out time: 11:10  Total Treatment Time (min): 72  Total Timed Codes (min):52  1:1 Treatment Time ( [address] only):  52  Visit #: 3 of     Treatment Area: Left knee pain [M25.562]    SUBJECTIVE  Pain Level IN:(0-10 scale): 0/10  Pain Level OUT: (0-10 scale) post treatment: 0/10    Any medication changes, allergies to medications, adverse drug reactions, diagnosis change, or new procedure performed?: [x] No    [] Yes (see summary sheet for update)  Subjective functional status/changes:   [] No changes reported  I went to work yesterday and work about 6 hours, I did fine it just nice and swollen by the time I left.  I have been working on my exercises at home and I can tell that my quad is getting stronger I can feel it rachel better    OBJECTIVE    Modalities Rationale:     decrease edema, decrease inflammation and decrease pain to improve patient's ability to ambulate with normal gait pattern  15+5 set up min [x] Estim, type/location: To (L) knee                                      []  att     [x]  unatt     []  w/US     [x]  w/ice    []  w/heat    min []  Mechanical Traction: type/lbs                   []  pro   []  sup   []  int   []  cont    []  before manual    []  after manual    min []  Ultrasound, settings/location:      min []  Iontophoresis w/ dexamethasone, location:                                               []  take home patch       []  in clinic    min []  Ice     []  Heat    location/position:     min []  Vasopneumatic Device, press/temp:     min []  Other:    [] Skin assessment post-treatment (if applicable):    []  intact    []  redness- no adverse reaction     []redness - adverse reaction:        32 min Therapeutic Exercise:  [x] See flow sheet :   Added LAQ/SAQ  SLR   Rationale: increase ROM and increase strength to improve the patients ability to restore patient to normal ADLs     min Therapeutic Activity:  []  See flow sheet :   Rationale: increase ROM and increase strength  to improve the patients ability to ambulate with normal gait pattern when patient is full weight bearing       min Neuromuscular Re-education:  []  See flow sheet :   Rationale: improve coordination, improve balance and increase proprioception  to improve the patients ability to achieve all goals stated below     20 min Manual Therapy:  Sitting edge of bed and in supine PROM to increase knee flexion and DTM to lateral quad   Rationale: decrease pain, increase ROM, increase tissue extensibility, decrease edema  and decrease trigger points to (L) knee             With   [] TE   [] TA   [] neuro   [] other: Patient Education: [x] Review HEP    [] Progressed/Changed HEP based on:   [] positioning   [] body mechanics   [] transfers   [] heat/ice application    [] other:      Objective/Functional Measures with Therapist Assessment Noted with Response to Therapy Session:  Added:   SLR - 1 way   SAQ/LAQ  Able to achieve 105 degrees in supine with sheet without tightness at end range to posterior aspect of the knee  Sitting EOB active knee flexion to 95 degrees     GOALS  1. Pt will be IND and compliant with HEP for self-management of symptoms. Patient reported compliance  4/19/2022    ASSESSMENT/Changes in Function:    Patient was able to perform an improved quad contraction to the VMO without manual tapping to get the VMO to contract. Patient was able to perform LAQ/SAQ actively lacking a couple of degrees of full extension .  Patient was able to achieve 95 active knee flexion sitting EOB with some discomfort and reported a \"catch\" at end range  Patient will continue to benefit from skilled PT services to modify and progress therapeutic interventions, address functional mobility deficits, address ROM deficits, address strength deficits, analyze and address soft tissue restrictions, analyze and cue movement patterns and assess and modify postural abnormalities to attain remaining goals. [x]  See Plan of Care  []  See progress note/recertification  []  See Discharge Summary         Progress towards goals / Updated goals: · Short Term Goals: To be accomplished in  2  weeks:  · 1. Pt will be IND and compliant with HEP for self-management of symptoms. Patient reported compliance  4/19/2022  · Long Term Goals: To be accomplished in  4  weeks:  1. Pt will improve L knee AROM 0-115 deg to prepare for next phase of rehab. 2. Pt will be able to perform SLR without quad lag to improve functional transfers. 3. Pt will be able to demo 20% WB to progress to next phase of gait when cleared by MD.  4. Pt will improve FOTO score to at least 74/100 asa functional indicator of improved mobility.      PLAN  [x]  Upgrade activities as tolerated     [x]  Continue plan of care  []  Update interventions per flow sheet       []  Discharge due to:_  []  Other:_      Alexus Tran PTA 4/19/2022  12:16 PM    Future Appointments   Date Time Provider Ayaz Kelly   4/22/2022 10:00 AM [address] [facility]   4/26/2022  9:15 AM Jocelin Faulknerr, FELICIAT [facility]   4/28/2022 10:45 AM Mary Pino PTA [facility]   5/3/2022  9:15 AM Mary Pino PTA [facility]   5/6/2022  8:30 AM Jocelin Mireles, DPT [facility]   5/12/2022  9:15 AM Mary Pino PTA [facility]

## 2022-04-22 ENCOUNTER — HOSPITAL ENCOUNTER (OUTPATIENT)
Dept: PHYSICAL THERAPY | Age: 47
Discharge: HOME | End: 2022-04-22
Payer: COMMERCIAL

## 2022-04-22 PROCEDURE — 97110 THERAPEUTIC EXERCISES: CPT

## 2022-04-22 PROCEDURE — 97014 ELECTRIC STIMULATION THERAPY: CPT

## 2022-04-22 PROCEDURE — 97140 MANUAL THERAPY 1/> REGIONS: CPT

## 2022-04-22 NOTE — PROGRESS NOTES
PT DAILY TREATMENT NOTE - [facility]     Patient Name: Olamide Ralph  Date:2022  : 1975  [x]  Patient  Verified  Payor: BLUE CROSS / Plan: [employer] [facility] / Product Type: PPO /    In time: 10:00  Out time: 11:10  Total Treatment Time (min): 70  Total Timed Codes (min):55  1:1 Treatment Time ( only):  54  Visit #: 4 of     Treatment Area: Left knee pain [M25.562]    SUBJECTIVE  Pain Level IN:(0-10 scale): 0/10  Pain Level OUT: (0-10 scale) post treatment: 0/10    Any medication changes, allergies to medications, adverse drug reactions, diagnosis change, or new procedure performed?: [x] No    [] Yes (see summary sheet for update)  Subjective functional status/changes:   [] No changes reported  \"Not having any pain. I just have a lot of swelling which I elevate end of the day. \"    OBJECTIVE    Modalities Rationale:     decrease edema, decrease inflammation and decrease pain to improve patient's ability to ambulate with normal gait pattern  15 min [x] Estim, type/location: To (L) knee                                      []  att     [x]  unatt     []  w/US     [x]  W/ice A/P    []  w/heat    min []  Mechanical Traction: type/lbs                   []  pro   []  sup   []  int   []  cont    []  before manual    []  after manual    min []  Ultrasound, settings/location:      min []  Iontophoresis w/ dexamethasone, location:                                               []  take home patch       []  in clinic    min []  Ice     []  Heat    location/position:     min []  Vasopneumatic Device, press/temp:     min []  Other:    [] Skin assessment post-treatment (if applicable):    []  intact    []  redness- no adverse reaction     []redness - adverse reaction:        35 min Therapeutic Exercise:  [x] See flow sheet :    Rationale: increase ROM and increase strength to improve the patients ability to restore patient to normal ADLs     min Therapeutic Activity:  []  See flow sheet :   Rationale: increase ROM and increase strength  to improve the patients ability to ambulate with normal gait pattern when patient is full weight bearing        min Neuromuscular Re-education:  []  See flow sheet :   Rationale: improve coordination, improve balance and increase proprioception  to improve the patients ability to achieve all goals stated below     20 min Manual Therapy:  DTM distal quad/post knee. PROM to increase knee flexion. Rationale: decrease pain, increase ROM, increase tissue extensibility, decrease edema  and decrease trigger points to (L) knee             With   [] TE   [] TA   [] neuro   [] other: Patient Education: [x] Review HEP    [] Progressed/Changed HEP based on:   [] positioning   [] body mechanics   [] transfers   [] heat/ice application    [] other:      Objective/Functional Measures:  108 deg in supine with strap. Endrange pain post knee. ASSESSMENT/Changes in Function: Improved L knee flex in supine with strap to 108 deg but cont pain at endrange. Slowly improving quad contraction with QS. SAQ/LAQ. Almost reaching near full ext with intermittent c/o calf pain. Pt progressing well overall but does require cueing for heel-toe @ 20% WB'ing (as opposed to NWB.)      Patient will continue to benefit from skilled PT services to modify and progress therapeutic interventions, address functional mobility deficits, address ROM deficits, address strength deficits, analyze and address soft tissue restrictions, analyze and cue movement patterns and assess and modify postural abnormalities to attain remaining goals. [x]  See Plan of Care  []  See progress note/recertification  []  See Discharge Summary         Progress towards goals / Updated goals: · Short Term Goals: To be accomplished in  2  weeks:  · 1. Pt will be IND and compliant with HEP for self-management of symptoms. Patient reported compliance  4/19/2022  · Long Term Goals: To be accomplished in  4  weeks:  1.  Pt will improve L knee AROM 0-115 deg to prepare for next phase of rehab. Progressing 4/22/22; 108 deg AA  2. Pt will be able to perform SLR without quad lag to improve functional transfers. 3. Pt will be able to demo 20% WB to progress to next phase of gait when cleared by MD.  4. Pt will improve FOTO score to at least 74/100 asa functional indicator of improved mobility.      PLAN  [x]  Upgrade activities as tolerated     [x]  Continue plan of care  []  Update interventions per flow sheet       []  Discharge due to:_  []  Other:_      Dc Ruiz, PTA 4/22/2022  12:16 PM    Future Appointments   Date Time Provider Ayaz Kelly   4/22/2022 10:00 AM [facility] [place] [facility]   4/26/2022  9:15 AM Sonny Argueta, DPT [facility]   4/28/2022 10:45 AM [facility] PT [place] [address] [facility]   5/3/2022  9:15 AM Ana Maria Medal, PTA [facility]   5/6/2022  8:30 AM [facility] PT [facility]   5/12/2022  9:15 AM [facility]

## 2022-04-26 ENCOUNTER — HOSPITAL ENCOUNTER (OUTPATIENT)
Dept: PHYSICAL THERAPY | Age: 47
Discharge: HOME | End: 2022-04-26
Payer: COMMERCIAL

## 2022-04-26 PROCEDURE — 97530 THERAPEUTIC ACTIVITIES: CPT | Performed by: PHYSICAL THERAPIST

## 2022-04-26 PROCEDURE — 97014 ELECTRIC STIMULATION THERAPY: CPT | Performed by: PHYSICAL THERAPIST

## 2022-04-26 PROCEDURE — 97110 THERAPEUTIC EXERCISES: CPT | Performed by: PHYSICAL THERAPIST

## 2022-04-26 NOTE — PROGRESS NOTES
PT DAILY TREATMENT NOTE - [facility]     Patient Name: Alysa Hager  Date:2022  : 1975  [x]  Patient  Verified  Payor: BLUE CROSS / Plan: [employer] [facility] / Product Type: PPO /    In time: 920  Out time: 1645  Total Treatment Time (min): 69  Total Timed Codes (min):69  1:1 Treatment Time ( only):  59  Visit #: 5 of     Treatment Area: Left knee pain [M25.562]    SUBJECTIVE  Pain Level IN:(0-10 scale): 1/10 -calf  Pain Level OUT: (0-10 scale) post treatment: 0/10    Any medication changes, allergies to medications, adverse drug reactions, diagnosis change, or new procedure performed?: [x] No    [] Yes (see summary sheet for update)  Subjective functional status/changes:   [] No changes reported  Pt reports sitting at a baseball game for 2 hours and he had increased swelling to the point where it swelling and had foot/ankle swelling and increased pain. The swelling went down today or he was going to be concerned with a blood clot.      OBJECTIVE    Modalities Rationale:     decrease edema, decrease inflammation and decrease pain to improve patient's ability to ambulate with normal gait pattern  15 min [x] Estim, type/location: To (L) knee                                      []  att     [x]  unatt     []  w/US     [x]  W/ice A/P    []  w/heat    min []  Mechanical Traction: type/lbs                   []  pro   []  sup   []  int   []  cont    []  before manual    []  after manual    min []  Ultrasound, settings/location:      min []  Iontophoresis w/ dexamethasone, location:                                               []  take home patch       []  in clinic    min []  Ice     []  Heat    location/position:     min []  Vasopneumatic Device, press/temp:     min []  Other:    [] Skin assessment post-treatment (if applicable):    []  intact    []  redness- no adverse reaction     []redness - adverse reaction:        44 min Therapeutic Exercise:  [x] See flow sheet :    Rationale: increase ROM and increase strength to improve the patients ability to restore patient to normal ADLs    10 min Therapeutic Activity:  []  See flow sheet : Contacted MD office regarding pt concerns of DVT   Rationale: increase ROM and increase strength  to improve the patients ability to ambulate with normal gait pattern when patient is full weight bearing        min Neuromuscular Re-education:  []  See flow sheet :   Rationale: improve coordination, improve balance and increase proprioception  to improve the patients ability to achieve all goals stated below     H min Manual Therapy:  DTM distal quad/post knee. PROM to increase knee flexion. Rationale: decrease pain, increase ROM, increase tissue extensibility, decrease edema  and decrease trigger points to (L) knee             With   [] TE   [] TA   [] neuro   [] other: Patient Education: [x] Review HEP    [] Progressed/Changed HEP based on:   [] positioning   [] body mechanics   [] transfers   [] heat/ice application    [] other:      Objective/Functional Measures:  -Increased redness on the L LE secondary to sunburn  -Generalized soreness throughout the L gastroc. Slight tenderness to touch  -Calf Girth: R: 38.5 cm L: 37 cm  -AROM of L knee flexion: 102 deg      ASSESSMENT/Changes in Function:   Pt presents with increased concerns about the episode of swelling that happened yesterday. PT contacted MD and MD returned patients phone call at the end of  treatment. MD states that it is probably positional however, if it happens again to call the MD back. Held gastroc strengthening and manual until pt spoke to MD (end of treatment while he was on ice) to reduce risk of increased pain. Pt to return to current POC next visit as he was cleared.      Patient will continue to benefit from skilled PT services to modify and progress therapeutic interventions, address functional mobility deficits, address ROM deficits, address strength deficits, analyze and address soft tissue restrictions, analyze and cue movement patterns and assess and modify postural abnormalities to attain remaining goals. [x]  See Plan of Care  []  See progress note/recertification  []  See Discharge Summary         Progress towards goals / Updated goals: · Short Term Goals: To be accomplished in  2  weeks:  · 1. Pt will be IND and compliant with HEP for self-management of symptoms. Patient reported compliance  4/19/2022  · Long Term Goals: To be accomplished in  4  weeks:  1. Pt will improve L knee AROM 0-115 deg to prepare for next phase of rehab. Progressing 4/22/22; 108 deg AA  2. Pt will be able to perform SLR without quad lag to improve functional transfers. 3. Pt will be able to demo 20% WB to progress to next phase of gait when cleared by MD.  4. Pt will improve FOTO score to at least 74/100 asa functional indicator of improved mobility. PLAN  [x]  Upgrade activities as tolerated     [x]  Continue plan of care  []  Update interventions per flow sheet       []  Discharge due to:_  [x]  Other: check goals NV as therapy was limited this session secondary to increased pain and concern.        Dell Roa DPT 4/26/2022  1034 AM    Future Appointments   Date Time Provider Ayaz Kelly   4/28/2022 10:45 AM [facility] PT [place] [facility]   5/3/2022  9:15 AM Roxie Harrison PTA [facility]   5/6/2022  8:30 AM [facility] PT [facility]   5/12/2022  9:15 AM Roxie Harrison PTA [facility]

## 2022-04-28 ENCOUNTER — HOSPITAL ENCOUNTER (OUTPATIENT)
Dept: PHYSICAL THERAPY | Age: 47
Discharge: HOME | End: 2022-04-28
Payer: COMMERCIAL

## 2022-04-28 PROCEDURE — 97140 MANUAL THERAPY 1/> REGIONS: CPT

## 2022-04-28 PROCEDURE — 97014 ELECTRIC STIMULATION THERAPY: CPT

## 2022-04-28 PROCEDURE — 97110 THERAPEUTIC EXERCISES: CPT

## 2022-04-28 NOTE — PROGRESS NOTES
PT DAILY TREATMENT NOTE - [facility]     Patient Name: Chrissy Duong  Date:2022  : 1975  [x]  Patient  Verified  Payor: BLUE CROSS / Plan: [facility] / Product Type: PPO /    In time: 10:45  Out time: 11:50  Total Treatment Time (min): 65  Total Timed Codes (min) 50  1:1 Treatment Time ( only):  39  Visit #: 6 of     Treatment Area: Left knee pain [M25.562]    SUBJECTIVE  Pain Level IN:(0-10 scale): 1/10 -calf  Pain Level OUT: (0-10 scale) post treatment: 0/10    Any medication changes, allergies to medications, adverse drug reactions, diagnosis change, or new procedure performed?: [x] No    [] Yes (see summary sheet for update)  Subjective functional status/changes:   [] No changes reported  The swelling is better in the leg and knee, now it is just really tight and painful to the back of my calf and when I put weight thru it, it begins to tingling and have weird nerve pain in the foot and calf     OBJECTIVE    Modalities Rationale:     decrease edema, decrease inflammation and decrease pain to improve patient's ability to ambulate with normal gait pattern  15 min [x] Estim, type/location: To (L) knee                                      []  att     [x]  unatt     []  w/US     [x]  W/ice A/P    []  w/heat    min []  Mechanical Traction: type/lbs                   []  pro   []  sup   []  int   []  cont    []  before manual    []  after manual    min []  Ultrasound, settings/location:      min []  Iontophoresis w/ dexamethasone, location:                                               []  take home patch       []  in clinic    min []  Ice     []  Heat    location/position:     min []  Vasopneumatic Device, press/temp:     min []  Other:    [] Skin assessment post-treatment (if applicable):    []  intact    []  redness- no adverse reaction     []redness - adverse reaction:        30 min Therapeutic Exercise:  [x] See flow sheet :    Rationale: increase ROM and increase strength to improve the patients ability to restore patient to normal ADLs     min Therapeutic Activity:  []  See flow sheet :    Rationale: increase ROM and increase strength  to improve the patients ability to ambulate with normal gait pattern when patient is full weight bearing        min Neuromuscular Re-education:  []  See flow sheet :   Rationale: improve coordination, improve balance and increase proprioception  to improve the patients ability to achieve all goals stated below     20 min Manual Therapy:  PROM sitting EOB and DTM to the lateral quad in prone DTM and myofascial release to gastro/soleus and hamstrings to assist with achieving full knee extension    Rationale: decrease pain, increase ROM, increase tissue extensibility, decrease edema  and decrease trigger points to (L) knee             With   [] TE   [] TA   [] neuro   [] other: Patient Education: [x] Review HEP    [] Progressed/Changed HEP based on:   [] positioning   [] body mechanics   [] transfers   [] heat/ice application    [] other:      Objective/Functional Measures:    AAROM with use of strap of L knee flexion: 115 deg in supine     GOALS  1. Pt will improve L knee AROM 0-115 deg to prepare for next phase of rehab. Progressing 4/28/2022 AAROM with strap 115 degrees flexion    ASSESSMENT/Changes in Function:   Secondary to patient's subjective pain and that patient was cleared by MD regarding possible DVT, performed with patient in prone DTM and myofascial release to hamstrings and gastro/soleus to decrease trigger points and tightness with achieving full extension. Patient reported decrease pain with weight bearing and decrease tightness and pulling felt to the calf area.     Patient will continue to benefit from skilled PT services to modify and progress therapeutic interventions, address functional mobility deficits, address ROM deficits, address strength deficits, analyze and address soft tissue restrictions, analyze and cue movement patterns and assess and modify postural abnormalities to attain remaining goals. [x]  See Plan of Care  []  See progress note/recertification  []  See Discharge Summary         Progress towards goals / Updated goals: · Short Term Goals: To be accomplished in  2  weeks:  · 1. Pt will be IND and compliant with HEP for self-management of symptoms. Patient reported compliance  4/19/2022  · Long Term Goals: To be accomplished in  4  weeks:  1. Pt will improve L knee AROM 0-115 deg to prepare for next phase of rehab. Progressing 4/28/2022 AAROM with strap 115 degrees flexion  2. Pt will be able to perform SLR without quad lag to improve functional transfers. 3. Pt will be able to demo 20% WB to progress to next phase of gait when cleared by MD.  4. Pt will improve FOTO score to at least 74/100 asa functional indicator of improved mobility.      PLAN  [x]  Upgrade activities as tolerated     [x]  Continue plan of care  []  Update interventions per flow sheet       []  Discharge due to:_  []  Other:      Alexus Tran PTA 4/28/2022  1034 AM    Future Appointments   Date Time Provider Ayaz Kelly   5/3/2022  9:15 AM Mary Pino [facility]   5/6/2022  8:30 AM [facility] PT [place] [address] [place] [address] [facility]   5/12/2022  9:15 AM Mary Pino [facility]

## 2022-05-03 ENCOUNTER — HOSPITAL ENCOUNTER (OUTPATIENT)
Dept: PHYSICAL THERAPY | Age: 47
Discharge: HOME | End: 2022-05-03
Payer: COMMERCIAL

## 2022-05-03 PROCEDURE — 97110 THERAPEUTIC EXERCISES: CPT

## 2022-05-03 PROCEDURE — 97014 ELECTRIC STIMULATION THERAPY: CPT

## 2022-05-03 PROCEDURE — 97140 MANUAL THERAPY 1/> REGIONS: CPT

## 2022-05-03 NOTE — PROGRESS NOTES
PT DAILY TREATMENT NOTE - [facility]     Patient Name: Janie Hammer  Date:5/3/2022  : 1975  [x]  Patient  Verified  Payor: BLUE CROSS / Plan:  [facility] [place] / Product Type: PPO /    In time: 9:15  Out time: 10:20  Total Treatment Time (min):65  Total Timed Codes (min) 50  1:1 Treatment Time ( only):  50  Visit #: 7 of     Treatment Area: Left knee pain [M25.562]    SUBJECTIVE  Pain Level IN:(0-10 scale): 2/10  Pain Level OUT: (0-10 scale) post treatment: 0/10    Any medication changes, allergies to medications, adverse drug reactions, diagnosis change, or new procedure performed?: [x] No    [] Yes (see summary sheet for update)  Subjective functional status/changes:   [] No changes reported    I don't know if it was the way that I slept last night or what because I took the pillow out from between my knees and laid on my side a bit but now the inside of my knee is really hurting me   OBJECTIVE    Modalities Rationale:     decrease edema, decrease inflammation and decrease pain to improve patient's ability to ambulate with normal gait pattern  15 min [x] Estim, type/location: To (L) knee  Ice to anterior and MH to posterior                                     []  att     [x]  unatt     []  w/US     [x]  W/ice A/P    [x]  w/heat    min []  Mechanical Traction: type/lbs                   []  pro   []  sup   []  int   []  cont    []  before manual    []  after manual    min []  Ultrasound, settings/location:      min []  Iontophoresis w/ dexamethasone, location:                                               []  take home patch       []  in clinic    min []  Ice     []  Heat    location/position:     min []  Vasopneumatic Device, press/temp:     min []  Other:    [] Skin assessment post-treatment (if applicable):    []  intact    []  redness- no adverse reaction     []redness - adverse reaction:        30 min Therapeutic Exercise:  [x] See flow sheet :   Added tammi    Rationale: increase ROM and increase strength to improve the patients ability to restore patient to normal ADLs     min Therapeutic Activity:  []  See flow sheet :    Rationale: increase ROM and increase strength  to improve the patients ability to ambulate with normal gait pattern when patient is full weight bearing        min Neuromuscular Re-education:  []  See flow sheet :   Rationale: improve coordination, improve balance and increase proprioception  to improve the patients ability to achieve all goals stated below     20 min Manual Therapy:  PROM sitting EOB and DTM to the lateral quad in prone DTM and myofascial release to gastro/soleus and hamstrings to assist with achieving full knee extension    Rationale: decrease pain, increase ROM, increase tissue extensibility, decrease edema  and decrease trigger points to (L) knee             With   [] TE   [] TA   [] neuro   [] other: Patient Education: [x] Review HEP    [] Progressed/Changed HEP based on:   [] positioning   [] body mechanics   [] transfers   [] heat/ice application    [] other:      Objective/Functional Measures: Added Tammi   AAROM with sheet 115     ASSESSMENT/Changes in Function:   Patient presents with decrease edema to the knee joint however pin point tenderness to the medial joint line possibly due to surgical procedure or possible slight medial rotation to the knee that may have occurred while patient attempted to sleeping for the first time in side lying causing some slight stress to the medial aspect of the knee joint.  Patient did present with decrease tightness and trigger points to the calf and hamstrings and improved visible muscle tone the quads noted with active quad sets and able to perform tammi with no quad lag  Patient will continue to benefit from skilled PT services to modify and progress therapeutic interventions, address functional mobility deficits, address ROM deficits, address strength deficits, analyze and address soft tissue restrictions, analyze and cue movement patterns and assess and modify postural abnormalities to attain remaining goals. [x]  See Plan of Care  []  See progress note/recertification  []  See Discharge Summary         Progress towards goals / Updated goals: · Short Term Goals: To be accomplished in  2  weeks:  · 1. Pt will be IND and compliant with HEP for self-management of symptoms. Patient reported compliance  4/19/2022  · Long Term Goals: To be accomplished in  4  weeks:  1. Pt will improve L knee AROM 0-115 deg to prepare for next phase of rehab. Progressing 4/28/2022 AAROM with strap 115 degrees flexion  2. Pt will be able to perform SLR without quad lag to improve functional transfers. 3. Pt will be able to demo 20% WB to progress to next phase of gait when cleared by MD.  4. Pt will improve FOTO score to at least 74/100 asa functional indicator of improved mobility.      PLAN  [x]  Upgrade activities as tolerated     [x]  Continue plan of care  []  Update interventions per flow sheet       []  Discharge due to:_  []  Other:      Neel Handley PTA 5/3/2022  1034 AM    Future Appointments   Date Time Provider Ayaz Kelly   5/6/2022  8:30 AM [facility] [place] [facility]   5/12/2022  9:15 AM Jennifer Marvin PTA [facility]

## 2022-05-06 ENCOUNTER — HOSPITAL ENCOUNTER (OUTPATIENT)
Dept: PHYSICAL THERAPY | Age: 47
Discharge: HOME | End: 2022-05-06
Payer: COMMERCIAL

## 2022-05-06 PROCEDURE — 97110 THERAPEUTIC EXERCISES: CPT

## 2022-05-06 PROCEDURE — 97014 ELECTRIC STIMULATION THERAPY: CPT

## 2022-05-06 PROCEDURE — 97140 MANUAL THERAPY 1/> REGIONS: CPT

## 2022-05-06 NOTE — PROGRESS NOTES
PT DAILY TREATMENT NOTE - [facility]     Patient Name: Toan Whitmore  Date:2022  : 1975  [x]  Patient  Verified  Payor: BLUE CROSS / Plan: [facility] [facility] [place] / Product Type: PPO /    In time: 8:35 Out time: 9:30  Total Treatment Time (min):55  Total Timed Codes (min) 55  1:1 Treatment Time ( only):  40  Visit #: 8 of     Treatment Area: Left knee pain [M25.562]    SUBJECTIVE  Pain Level IN:(0-10 scale): 0/10  Pain Level OUT: (0-10 scale) post treatment:0/10    Any medication changes, allergies to medications, adverse drug reactions, diagnosis change, or new procedure performed?: [x] No    [] Yes (see summary sheet for update)  Subjective functional status/changes:   [] No changes reported  \"Feeling a lot better overall. Color is back in my foot. Calf and medial knee are a lot better. To the MD 5/10/22. \"    OBJECTIVE    Modalities Rationale:     decrease edema, decrease inflammation and decrease pain to improve patient's ability to ambulate with normal gait pattern  15 min [x] Estim, type/location: To (L) knee  Ice to anterior and MH to posterior                                     []  att     [x]  unatt     []  w/US     [x]  W/ice A/P    [x]  w/heat    min []  Mechanical Traction: type/lbs                   []  pro   []  sup   []  int   []  cont    []  before manual    []  after manual    min []  Ultrasound, settings/location:      min []  Iontophoresis w/ dexamethasone, location:                                               []  take home patch       []  in clinic    min []  Ice     []  Heat    location/position:     min []  Vasopneumatic Device, press/temp:     min []  Other:    [] Skin assessment post-treatment (if applicable):    []  intact    []  redness- no adverse reaction     []redness - adverse reaction:        30 min Therapeutic Exercise:  [x] See flow sheet :    Rationale: increase ROM and increase strength to improve the patients ability to restore patient to normal ADLs min Therapeutic Activity:  []  See flow sheet :    Rationale: increase ROM and increase strength  to improve the patients ability to ambulate with normal gait pattern when patient is full weight bearing        min Neuromuscular Re-education:  []  See flow sheet :   Rationale: improve coordination, improve balance and increase proprioception  to improve the patients ability to achieve all goals stated below     10 min Manual Therapy:Supine: DTM to quad and patellar tendon. Prone: DTM and myofascial release to gastro/soleus and hamstrings    Rationale: decrease pain, increase ROM, increase tissue extensibility, decrease edema  and decrease trigger points to (L) knee             With   [] TE   [] TA   [] neuro   [] other: Patient Education: [x] Review HEP    [] Progressed/Changed HEP based on:   [] positioning   [] body mechanics   [] transfers   [] heat/ice application    [] other:      Objective/Functional Measures:  AAROM with sheet 120 deg  Tender medial jt line, proximal gastroc, and patellar tendon    ASSESSMENT/Changes in Function: Pt is now 5 weeks post op and presents with decreased edema, pain, and pin point tenderness from previous visit. Demonstrates pain free AAROM to 120 deg and able to perform SLR with no lag. Pt progressing well overall within protocol; f/u with MD 5/10/22. Patient will continue to benefit from skilled PT services to modify and progress therapeutic interventions, address functional mobility deficits, address ROM deficits, address strength deficits, analyze and address soft tissue restrictions, analyze and cue movement patterns and assess and modify postural abnormalities to attain remaining goals. [x]  See Plan of Care  []  See progress note/recertification  []  See Discharge Summary         Progress towards goals / Updated goals: · Short Term Goals: To be accomplished in  2  weeks:  · 1. Pt will be IND and compliant with HEP for self-management of symptoms.  Patient reported compliance  4/19/2022  · Long Term Goals: To be accomplished in  4  weeks:  1. Pt will improve L knee AROM 0-115 deg to prepare for next phase of rehab. Progressing 4/28/2022 AAROM with strap 115 degrees flexion  2. Pt will be able to perform SLR without quad lag to improve functional transfers. Able 5/6/22  3. Pt will be able to demo 20% WB to progress to next phase of gait when cleared by MD. MD f/u 5/10/22  4. Pt will improve FOTO score to at least 74/100 asa functional indicator of improved mobility. PLAN  [x]  Upgrade activities as tolerated     [x]  Continue plan of care  []  Update interventions per flow sheet       []  Discharge due to:_  [x]  Other:  PN CATALINA Aguilar PTA 5/6/2022  1034 AM    Future Appointments   Date Time Provider Ayaz Kelly   5/6/2022  8:30 AM [facility] PT [place] [address] [place] [address] [facility]   5/12/2022  9:15 AM Anastasia Flannery PTA [facility]

## 2022-05-10 ENCOUNTER — APPOINTMENT (OUTPATIENT)
Dept: PHYSICAL THERAPY | Age: 47
End: 2022-05-10
Payer: COMMERCIAL

## 2022-05-12 ENCOUNTER — HOSPITAL ENCOUNTER (OUTPATIENT)
Dept: PHYSICAL THERAPY | Age: 47
Discharge: HOME | End: 2022-05-12
Payer: COMMERCIAL

## 2022-05-12 PROCEDURE — 97530 THERAPEUTIC ACTIVITIES: CPT

## 2022-05-12 PROCEDURE — 97014 ELECTRIC STIMULATION THERAPY: CPT

## 2022-05-12 PROCEDURE — 97116 GAIT TRAINING THERAPY: CPT

## 2022-05-12 PROCEDURE — 97110 THERAPEUTIC EXERCISES: CPT

## 2022-05-12 NOTE — PROGRESS NOTES
PT DAILY TREATMENT NOTE - [facility]     Patient Name: Jaspal Aguilar  Date:2022  : 1975  [x]  Patient  Verified  Payor: BLUE CROSS / Plan:  [facility] [place] / Product Type: PPO /    In time: 9:15 Out time: 10:20  Total Treatment Time (min):65  Total Timed Codes (min) 50  1:1 Treatment Time ( only):  45  Visit #: 9     Treatment Area: Left knee pain [M25.562]    SUBJECTIVE  Pain Level IN:(0-10 scale): 0/10  Pain Level OUT: (0-10 scale) post treatment 0/10    Any medication changes, allergies to medications, adverse drug reactions, diagnosis change, or new procedure performed?: [x] No    [] Yes (see summary sheet for update)  Subjective functional status/changes:   [] No changes reported  I am doing good, I saw the doctor and he said that I can get off these crutches when I think or you guys think I am ready but that I can't squat past 100 degrees until end of .     OBJECTIVE    Modalities Rationale:     decrease edema, decrease inflammation and decrease pain to improve patient's ability to ambulate with normal gait pattern  15 min [x] Estim, type/location: To (L) knee  Ice to anterior                                     []  att     [x]  unatt     []  w/US     [x]  W/ice A/P    [x]  w/heat    min []  Mechanical Traction: type/lbs                   []  pro   []  sup   []  int   []  cont    []  before manual    []  after manual    min []  Ultrasound, settings/location:      min []  Iontophoresis w/ dexamethasone, location:                                               []  take home patch       []  in clinic    min []  Ice     []  Heat    location/position:     min []  Vasopneumatic Device, press/temp:     min []  Other:    [] Skin assessment post-treatment (if applicable):    []  intact    []  redness- no adverse reaction     []redness - adverse reaction:        22 min Therapeutic Exercise:  [x] See flow sheet :    Rationale: increase ROM and increase strength to improve the patients ability to restore patient to normal ADLs    10 min Therapeutic Activity:  []  See flow sheet :   Glut Bridges with GSB   Bridges with ball squeezes    Rationale: increase ROM and increase strength  to improve the patients ability to ambulate with normal gait pattern when patient is full weight bearing       5 min Neuromuscular Re-education:  [x]  See flow sheet :  Contract/relax technique to hamstring in sitting EOB followed by passive LAQ into a hold position into full knee extension    Rationale: improve coordination, improve balance and increase proprioception  to improve the patients ability to achieve all goals stated below     5 min Manual Therapy: patella mobs and instructed in self patella mobs to improve patella glide    Rationale: decrease pain, increase ROM, increase tissue extensibility, decrease edema  and decrease trigger points to (L) knee       8 min Gait Training: gait training with use of 1 crutch with heel to toe gait pattern within the clinic to improve ambulation with only one crutch    Rationale: With   [] TE   [] TA   [] neuro   [] other: Patient Education: [x] Review HEP    [] Progressed/Changed HEP based on:   [] positioning   [] body mechanics   [] transfers   [] heat/ice application    [] other:      Objective/Functional Measures: Added: Gait training with use of 1 crutch with heel to toe gait pattern within the clinic to improve ambulation with only one crutch  Contract/relax technique to hamstring in sitting EOB followed by passive LAQ into a hold position into full knee extension   Patella mobs and instructed in self patella mobs to improve patella glide   Glut Bridges with GSB   Bridges with ball squeezes    GOALS  3.  Pt will be able to demo 20% WB to progress to next phase of gait when cleared by MD. MET 5/12/2022    ASSESSMENT/Changes in Function:  Patient arrived with new order from MD that stated WBAT LLE, avoid twisting, pivoting, cutting and squatting less than 100 degree until 6/30/2022. Will progress as able with within new protocol and orders  Patient was able to perform an improved LAQ with less pain after patella mobs and contract/relax technique to hamstring followed by LAQ  Stressed the importance of heel strike and placing the leg directly underneath the body with ambulation with one crutch, noted some muscle weakness to the quad with SLS phase onto the (L) leg   Core/trunk weakness noted with glut bridges with SB      Patient will continue to benefit from skilled PT services to modify and progress therapeutic interventions, address functional mobility deficits, address ROM deficits, address strength deficits, analyze and address soft tissue restrictions, analyze and cue movement patterns and assess and modify postural abnormalities to attain remaining goals. [x]  See Plan of Care  []  See progress note/recertification  []  See Discharge Summary         Progress towards goals / Updated goals: · Short Term Goals: To be accomplished in  2  weeks:  · 1. Pt will be IND and compliant with HEP for self-management of symptoms. Patient reported compliance  4/19/2022  · Long Term Goals: To be accomplished in  4  weeks:  1. Pt will improve L knee AROM 0-115 deg to prepare for next phase of rehab. Progressing 4/28/2022 AAROM with strap 115 degrees flexion  2. Pt will be able to perform SLR without quad lag to improve functional transfers. Able 5/6/22  3. Pt will be able to demo 20% WB to progress to next phase of gait when cleared by MD. MET 5/12/2022  4. Pt will improve FOTO score to at least 74/100 asa functional indicator of improved mobility. PLAN  [x]  Upgrade activities as tolerated     [x]  Continue plan of care  []  Update interventions per flow sheet       []  Discharge due to:_  []  Other:     Ed Gottlieb, SANTOS 5/12/2022  1034 AM    No future appointments.

## 2022-05-18 ENCOUNTER — HOSPITAL ENCOUNTER (OUTPATIENT)
Dept: PHYSICAL THERAPY | Age: 47
Discharge: HOME | End: 2022-05-18
Payer: COMMERCIAL

## 2022-05-18 PROCEDURE — 97014 ELECTRIC STIMULATION THERAPY: CPT | Performed by: PHYSICAL THERAPIST

## 2022-05-18 PROCEDURE — 97110 THERAPEUTIC EXERCISES: CPT | Performed by: PHYSICAL THERAPIST

## 2022-05-18 NOTE — PROGRESS NOTES
PT DAILY TREATMENT NOTE - [facility]     Patient Name: Starr Santiago  Date:2022  : 1975  [x]  Patient  Verified  Payor: BLUE CROSS / Plan: [employer] [facility] [place] / Product Type: PPO /    In time: 1469 Out time: 1235  Total Treatment Time (min):59  Total Timed Codes (min) 44  1:1 Treatment Time ( only):  25  Visit #: 10  of     Treatment Area: Left knee pain [M25.562]    SUBJECTIVE  Pain Level IN:(0-10 scale): 0/10  Pain Level OUT: (0-10 scale) post treatment 0/10    Any medication changes, allergies to medications, adverse drug reactions, diagnosis change, or new procedure performed?: [x] No    [] Yes (see summary sheet for update)  Subjective functional status/changes:   [] No changes reported  Pt reports 60-70% improvement since SOC. He reports improvement in ROM and strength however, continues to lack overall endurance. Functional limitations include standing for long-periods of time, and walking with a crutch. He reports that he does not use the crutch at home he just uses it when he starts to limit secondary.      OBJECTIVE    Modalities Rationale:     decrease edema, decrease inflammation and decrease pain to improve patient's ability to ambulate with normal gait pattern  15 min [x] Estim, type/location: To (L) knee  Ice to anterior                                     []  att     [x]  unatt     []  w/US     [x]  W/ice A/P    [x]  w/heat    min []  Mechanical Traction: type/lbs                   []  pro   []  sup   []  int   []  cont    []  before manual    []  after manual    min []  Ultrasound, settings/location:      min []  Iontophoresis w/ dexamethasone, location:                                               []  take home patch       []  in clinic    min []  Ice     []  Heat    location/position:     min []  Vasopneumatic Device, press/temp:     min []  Other:    [] Skin assessment post-treatment (if applicable):    []  intact    []  redness- no adverse reaction     []redness - adverse reaction:        25 min Therapeutic Exercise:  [x] See flow sheet :   PT reassessment/ FOTO  Upright bike  Standing incline/HS stretch  LAQ with 2#     Rationale: increase ROM and increase strength to improve the patients ability to restore patient to normal ADLs    19 min Therapeutic Activity:  []  See flow sheet :   SLRx3  St HR/TR  minisquats  Step up/lateral step ups 6\"   Rationale: increase ROM and increase strength  to improve the patients ability to ambulate with normal gait pattern when patient is full weight bearing        min Neuromuscular Re-education:  [x]  See flow sheet :     Rationale: improve coordination, improve balance and increase proprioception  to improve the patients ability to achieve all goals stated below     H min Manual Therapy: patella mobs and instructed in self patella mobs to improve patella glide    Rationale: decrease pain, increase ROM, increase tissue extensibility, decrease edema  and decrease trigger points to (L) knee           With   [] TE   [] TA   [] neuro   [] other: Patient Education: [x] Review HEP    [] Progressed/Changed HEP based on:   [] positioning   [] body mechanics   [] transfers   [] heat/ice application    [] other:      Objective/Functional Measures:  AROM of the L knee 0-120 deg  FOTO: increased to 55/100 from 38/100 at eval  No quad lag with SLR  Pt was able to ambulate around the clinic without AD demonstrating antalgic gait pattern  Pt was able to ascend/descend 4 therapy steps demonstrating reciprocal pattern for ascent and step too pattern for descent    ASSESSMENT/Changes in Function:  Upon reassessment, pt presents with improvements in R knee AROM. He continues to have reduced L LE quad strength noted with ability to descend stairs. Pt progressed to full weight-bearing however, continues to use 1 crutch secondary to abnormalities in gait pattern due to reduce strength and endurance due to recent surgical interventions.  Pt would benefit from continued therapy to increase overall strength and endurance to return to PLOF symptom free. Patient will continue to benefit from skilled PT services to modify and progress therapeutic interventions, address functional mobility deficits, address ROM deficits, address strength deficits, analyze and address soft tissue restrictions, analyze and cue movement patterns and assess and modify postural abnormalities to attain remaining goals. [x]  See Plan of Care  []  See progress note/recertification  []  See Discharge Summary         Progress towards goals / Updated goals: · Short Term Goals: To be accomplished in  2  weeks:  · 1. Pt will be IND and compliant with HEP for self-management of symptoms. Patient reported compliance  4/19/2022  · Long Term Goals: To be accomplished in  4  weeks:  1. Pt will improve L knee AROM 0-115 deg to prepare for next phase of rehab. Met 5/18/22  2. Pt will be able to perform SLR without quad lag to improve functional transfers. Able 5/6/22  3. Pt will be able to demo 20% WB to progress to next phase of gait when cleared by MD. MET 5/12/2022  4. Pt will improve FOTO score to at least 74/100 asa functional indicator of improved mobility.  progressing 5/18/22    PLAN  [x]  Upgrade activities as tolerated     [x]  Continue plan of care  []  Update interventions per flow sheet       []  Discharge due to:_  [x]  Other: Continue PT 2x/week for 8-12 visits    Cathy Reardon DPT 5/18/2022 602 PM    Future Appointments   Date Time Provider Ayaz Kelly   5/19/2022  5:00 PM Rakel Arroyo [facility]   5/24/2022 11:30 AM Keshav Becerra DPT [facility]   5/27/2022  1:15 PM [facility] PT [place] [facility]   6/1/2022 10:45 AM [facility] PT [facility]   6/3/2022 10:45 AM Eugenio Anglin PTA [facility]

## 2022-05-19 ENCOUNTER — APPOINTMENT (OUTPATIENT)
Dept: PHYSICAL THERAPY | Age: 47
End: 2022-05-19
Payer: COMMERCIAL

## 2022-05-24 ENCOUNTER — HOSPITAL ENCOUNTER (OUTPATIENT)
Dept: PHYSICAL THERAPY | Age: 47
Discharge: HOME | End: 2022-05-24
Payer: COMMERCIAL

## 2022-05-24 PROCEDURE — 97110 THERAPEUTIC EXERCISES: CPT | Performed by: PHYSICAL THERAPIST

## 2022-05-24 PROCEDURE — 97014 ELECTRIC STIMULATION THERAPY: CPT | Performed by: PHYSICAL THERAPIST

## 2022-05-24 PROCEDURE — 97530 THERAPEUTIC ACTIVITIES: CPT | Performed by: PHYSICAL THERAPIST

## 2022-05-24 NOTE — PROGRESS NOTES
PT DAILY TREATMENT NOTE - [facility]     Patient Name: Hansel Cantu  Date:2022  : 1975  [x]  Patient  Verified  Payor: BLUE CROSS / Plan: [employer] [facility] [place] / Product Type: PPO /    In time: 5603 Out time: 1235  Total Treatment Time (min):63  Total Timed Codes (min) 48  1:1 Treatment Time ( only):  25  Visit #: 1  of     Treatment Area: Left knee pain [M25.562]    SUBJECTIVE  Pain Level IN:(0-10 scale): 0/10  Pain Level OUT: (0-10 scale) post treatment 0/10    Any medication changes, allergies to medications, adverse drug reactions, diagnosis change, or new procedure performed?: [x] No    [] Yes (see summary sheet for update)  Subjective functional status/changes:   [] No changes reported  Pt reports that he stopped using his crutch last week. He has been doing fine without it so far.      OBJECTIVE    Modalities Rationale:     decrease edema, decrease inflammation and decrease pain to improve patient's ability to ambulate with normal gait pattern  15 min [x] Estim, type/location: To (L) knee  Ice to anterior                                     []  att     [x]  unatt     []  w/US     [x]  W/ice A/P    [x]  w/heat    min []  Mechanical Traction: type/lbs                   []  pro   []  sup   []  int   []  cont    []  before manual    []  after manual    min []  Ultrasound, settings/location:      min []  Iontophoresis w/ dexamethasone, location:                                               []  take home patch       []  in clinic    min []  Ice     []  Heat    location/position:     min []  Vasopneumatic Device, press/temp:     min []  Other:    [] Skin assessment post-treatment (if applicable):    []  intact    []  redness- no adverse reaction     []redness - adverse reaction:        23 min Therapeutic Exercise:  [x] See flow sheet :   Upright bike  Standing incline/HS stretch  LAQ with 2#  Prone HS curls 2#  3 way hip   Rationale: increase ROM and increase strength to improve the patients ability to restore patient to normal ADLs    25 min Therapeutic Activity:  []  See flow sheet :   SLRx3  St HR/TR  minisquats  Step up/lateral step ups 6\"  TG LV 20 DL  bridges   Rationale: increase ROM and increase strength  to improve the patients ability to ambulate with normal gait pattern when patient is full weight bearing        min Neuromuscular Re-education:  [x]  See flow sheet :     Rationale: improve coordination, improve balance and increase proprioception  to improve the patients ability to achieve all goals stated below     H min Manual Therapy: patella mobs and instructed in self patella mobs to improve patella glide    Rationale: decrease pain, increase ROM, increase tissue extensibility, decrease edema  and decrease trigger points to (L) knee           With   [] TE   [] TA   [] neuro   [] other: Patient Education: [x] Review HEP    [] Progressed/Changed HEP based on:   [] positioning   [] body mechanics   [] transfers   [] heat/ice application    [] other:      Objective/Functional Measures:  Increased quivering with prone HS curls   Increased pinching in the L knee with standing Toe raises  Pt ambulated into clinic without AD demonstrating normalized gait pattern  No quad lag noted with SLR with 2#    ASSESSMENT/Changes in Function:  Pt tolerated progression of therex without increased pain in the L knee. Continued functional weakness noted. Pt would benefit from continued therapy to address these deficits to return to work without restrictions as he is not back to running and deep squatting at this time. Patient will continue to benefit from skilled PT services to modify and progress therapeutic interventions, address functional mobility deficits, address ROM deficits, address strength deficits, analyze and address soft tissue restrictions, analyze and cue movement patterns and assess and modify postural abnormalities to attain remaining goals.      [x]  See Plan of Care  []  See progress note/recertification  []  See Discharge Summary         Progress towards goals / Updated goals:  1. Pt will improve FOTO score to at least 74/100 asa functional indicator of improved mobility.    2.  Pt will improve L knee eccentric quad strength to 5/5 to be able to descend stairs with reciprocal pattern. 3.  Pt will be able to ambulate community distances without AD demonstrating normalized gait pattern.  pt reports that he has not used his crutch since last week, 5/24/22       PLAN  [x]  Upgrade activities as tolerated     [x]  Continue plan of care  []  Update interventions per flow sheet       []  Discharge due to:_  [x]  Other: Check goals JAGDISH Martini DPT 5/24/2022 354 PM    Future Appointments   Date Time Provider Ayaz Kelly   5/27/2022  1:15 PM [facility] [place] [facility]   6/1/2022 10:45 AM [facility] [place] [facility]   6/3/2022 10:45 AM Cornelia Bob, [facility]

## 2022-05-26 ENCOUNTER — HOSPITAL ENCOUNTER (OUTPATIENT)
Dept: PHYSICAL THERAPY | Age: 47
Discharge: HOME | End: 2022-05-26
Payer: COMMERCIAL

## 2022-05-26 PROCEDURE — 97140 MANUAL THERAPY 1/> REGIONS: CPT

## 2022-05-26 PROCEDURE — 97014 ELECTRIC STIMULATION THERAPY: CPT

## 2022-05-26 PROCEDURE — 97110 THERAPEUTIC EXERCISES: CPT

## 2022-05-26 PROCEDURE — 97112 NEUROMUSCULAR REEDUCATION: CPT

## 2022-05-26 PROCEDURE — 97530 THERAPEUTIC ACTIVITIES: CPT

## 2022-05-26 NOTE — PROGRESS NOTES
PT DAILY TREATMENT NOTE - [facility]     Patient Name: Torsten Still  Date:2022  : 1975  [x]  Patient  Verified  Payor: BLUE CROSS / Plan:  [facility] [place] / Product Type: PPO /    In time: 3:30 Out time: 4:40  Total Treatment Time (min):70  Total Timed Codes (min) 55  1:1 Treatment Time ( only): 50  Visit #: 2  of     Treatment Area: Left knee pain [M25.562]    SUBJECTIVE  Pain Level IN:(0-10 scale): 1-2/10  Pain Level OUT: (0-10 scale) post treatment 0/10    Any medication changes, allergies to medications, adverse drug reactions, diagnosis change, or new procedure performed?: [x] No    [] Yes (see summary sheet for update)  Subjective functional status/changes:   [] No changes reported  My knee is really grumpy to the outside of my knee and it maybe because I am up on it more over the last couple of days doing training and I have to move fast, but today and yesterday it started doing that buckling thing again and it buckled coming down the stairs and it scared a bit     OBJECTIVE    Modalities Rationale:     decrease edema, decrease inflammation and decrease pain to improve patient's ability to ambulate with normal gait pattern  15 min [x] Estim, type/location: To (L) knee  Ice to anterior                                     []  att     [x]  unatt     []  w/US     [x]  W/ice A/P    [x]  w/heat    min []  Mechanical Traction: type/lbs                   []  pro   []  sup   []  int   []  cont    []  before manual    []  after manual    min []  Ultrasound, settings/location:      min []  Iontophoresis w/ dexamethasone, location:                                               []  take home patch       []  in clinic    min []  Ice     []  Heat    location/position:     min []  Vasopneumatic Device, press/temp:     min []  Other:    [] Skin assessment post-treatment (if applicable):    []  intact    []  redness- no adverse reaction     []redness - adverse reaction:        15 min Therapeutic Exercise:  [x] See flow sheet :   Upright bike  Standing incline/HS stretch  LAQ with 2#    3 way hip OTB   Rationale: increase ROM and increase strength to improve the patients ability to restore patient to normal ADLs    25 min Therapeutic Activity:  []  See flow sheet :   SLRx3  TG with heelraises lv 26  minisquats  Step up/lateral step ups 8\"  TG LV 26 DL and LV11 for SL  Eccentric step down 2\"   Rationale: increase ROM and increase strength  to improve the patients ability to ambulate with normal gait pattern when patient is full weight bearing        min Neuromuscular Re-education:  [x]  See flow sheet :     Rationale: improve coordination, improve balance and increase proprioception  to improve the patients ability to achieve all goals stated below     15 min Manual Therapy:  DTM and TPR to lateral quad,hamstring, CFM to the ITband tendon insertion to the lateral knee, and to anterior tib with fibula head mobs  with passive stretching of the TFL in side lying    Rationale: decrease pain, increase ROM, increase tissue extensibility, decrease edema  and decrease trigger points to (L) knee           With   [] TE   [] TA   [] neuro   [] other: Patient Education: [x] Review HEP    [] Progressed/Changed HEP based on:   [] positioning   [] body mechanics   [] transfers   [] heat/ice application    [] other:      Objective/Functional Measures:  Increase step up to 8\"  Added eccentric step down with manual overpressure to assist with patella tracking to 2\" step  Increased TG to DL to level 26, SL level 11 and HR at LV26    ASSESSMENT/Changes in Function:   Performed DTM and TPR to lateral quad,hamstring, CFM to the ITband tendon insertion to the lateral knee, and to anterior tib with fibula head mobs  with passive stretching of the TFL in side lying due to moderate and painful trigger points to these areas in the muscle belly of the lateral muscles attaching to the lateral knee.  Patient may have been compensating with decrease stride length, decrease knee flexion to attempt to rush around and perform work activities causing some increase stress to the lateral knee. Patient first reported increase discomfort to the lateral knee with TG single leg squats then had patient place foot into slight inversion, patient was able to perform single leg squats with no pain to the patella    Patient will continue to benefit from skilled PT services to modify and progress therapeutic interventions, address functional mobility deficits, address ROM deficits, address strength deficits, analyze and address soft tissue restrictions, analyze and cue movement patterns and assess and modify postural abnormalities to attain remaining goals. [x]  See Plan of Care  []  See progress note/recertification  []  See Discharge Summary         Progress towards goals / Updated goals:  1. Pt will improve FOTO score to at least 74/100 asa functional indicator of improved mobility.    2.  Pt will improve L knee eccentric quad strength to 5/5 to be able to descend stairs with reciprocal pattern. 3.  Pt will be able to ambulate community distances without AD demonstrating normalized gait pattern.  pt reports that he has not used his crutch since last week, 5/24/22       PLAN  [x]  Upgrade activities as tolerated     [x]  Continue plan of care  []  Update interventions per flow sheet       []  Discharge due to:_  []  Other:    Elvis Mota, PTA 5/26/2022 354 PM    Future Appointments   Date Time Provider Ayaz Kelly   6/1/2022 10:45 AM [facility]   6/3/2022 10:45 [facility]   6/6/2022 10:45 AM Veria Medal, DPT [facility]   6/8/2022  9:15 AM Veria Medal, DPT [facility]

## 2022-05-27 ENCOUNTER — APPOINTMENT (OUTPATIENT)
Dept: PHYSICAL THERAPY | Age: 47
End: 2022-05-27
Payer: COMMERCIAL

## 2022-06-01 ENCOUNTER — HOSPITAL ENCOUNTER (OUTPATIENT)
Dept: PHYSICAL THERAPY | Age: 47
Discharge: HOME | End: 2022-06-01
Payer: COMMERCIAL

## 2022-06-01 PROCEDURE — 97530 THERAPEUTIC ACTIVITIES: CPT

## 2022-06-01 PROCEDURE — 97014 ELECTRIC STIMULATION THERAPY: CPT

## 2022-06-01 PROCEDURE — 97110 THERAPEUTIC EXERCISES: CPT

## 2022-06-01 NOTE — PROGRESS NOTES
PT DAILY TREATMENT NOTE - [facility]     Patient Name: Chrissy Duong  Date:2022  : 1975  [x]  Patient  Verified  Payor: BLUE CROSS / Plan:  [facility] [place] / Product Type: PPO /    In time: 10:45 Out time: 11:45  Total Treatment Time (min): 60  Total Timed Codes (min) 60  1:1 Treatment Time ( only): 45  Visit #: 3 of     Treatment Area: Left knee pain [M25.562]    SUBJECTIVE  Pain Level IN:(0-10 scale): 0/10  Pain Level OUT: (0-10 scale) post treatment:  0/10    Any medication changes, allergies to medications, adverse drug reactions, diagnosis change, or new procedure performed?: [x] No    [] Yes (see summary sheet for update)  Subjective functional status/changes:   [] No changes reported  \"Last week was rough but today is better. A little stiff today because I did a lot over the weekend. Sometimes getting that pinch type pain in patellar tendon with coming down the stairs and feels like my leg wants to go into hyperextension or buckle. \"    OBJECTIVE    Modalities Rationale:     decrease edema, decrease inflammation and decrease pain to improve patient's ability to ambulate with normal gait pattern  15 min [x] Estim, type/location: To (L) knee  Ice to anterior / Heat to post                                    []  att     [x]  unatt     []  w/US     [x]  W/ice A/P    [x]  w/heat    min []  Mechanical Traction: type/lbs                   []  pro   []  sup   []  int   []  cont    []  before manual    []  after manual    min []  Ultrasound, settings/location:      min []  Iontophoresis w/ dexamethasone, location:                                               []  take home patch       []  in clinic    min []  Ice     []  Heat    location/position:     min []  Vasopneumatic Device, press/temp:     min []  Other:    [] Skin assessment post-treatment (if applicable):    []  intact    []  redness- no adverse reaction     []redness - adverse reaction:        15 min Therapeutic Exercise:  [x] See flow sheet :   Upright bike  Standing incline/HS stretch  LAQ with 3#  3 way hip OTB   Rationale: increase ROM and increase strength to improve the patients ability to restore patient to normal ADLs    30 min Therapeutic Activity:  []  See flow sheet :   SLR x 3 with 3#  SL Bridge  TG with heelraises lv 26  Minisquats  Step up/lateral step ups 8\"  TG LV 26 DL and LV11 for SL  Eccentric step down 2\" - NT - pain   Rationale: increase ROM and increase strength  to improve the patients ability to ambulate with normal gait pattern when patient is full weight bearing        min Neuromuscular Re-education:  [x]  See flow sheet :     Rationale: improve coordination, improve balance and increase proprioception  to improve the patients ability to achieve all goals stated below     NT min Manual Therapy:  Instruction in scar mobs. Rationale: decrease pain, increase ROM, increase tissue extensibility, decrease edema  and decrease trigger points to (L) knee           With   [] TE   [] TA   [] neuro   [] other: Patient Education: [x] Review HEP    [] Progressed/Changed HEP based on:   [] positioning   [] body mechanics   [] transfers   [] heat/ice application    [] other:      Objective/Functional Measures:  Progressed PREs. ASSESSMENT/Changes in Function:   Pt did c/o \"pinching\" pain in patellar tendon during down portion of step ups; but eliminated with stepping down with L first. Advised pt against performing activities that cause tendon irritation. Unable to perform ecc step down today secondary to sharp pain at lat portal site; suspect scar tissue and instructed pt in scar mobs for home to perform daily.        Patient will continue to benefit from skilled PT services to modify and progress therapeutic interventions, address functional mobility deficits, address ROM deficits, address strength deficits, analyze and address soft tissue restrictions, analyze and cue movement patterns and assess and modify postural abnormalities to attain remaining goals. [x]  See Plan of Care  []  See progress note/recertification  []  See Discharge Summary         Progress towards goals / Updated goals:  1. Pt will improve FOTO score to at least 74/100 asa functional indicator of improved mobility.    2.  Pt will improve L knee eccentric quad strength to 5/5 to be able to descend stairs with reciprocal pattern. 6/1/22; pain with 2\" ecc step down   3. Pt will be able to ambulate community distances without AD demonstrating normalized gait pattern.  pt reports that he has not used his crutch since last week, 5/24/22       PLAN  [x]  Upgrade activities as tolerated     [x]  Continue plan of care  []  Update interventions per flow sheet       []  Discharge due to:_  []  Other:    Drew Hall PTA 6/1/2022 11:45AM    Future Appointments   Date Time Provider Ayaz Kelly   6/1/2022 10:45 AM [facility] PT [facility] 1 [facility]   6/3/2022 10:45 AM Daniel Andrews PTA [facility]   6/6/2022 10:45 AM Missy Galvin DPT [facility]   6/8/2022  9:15 AM Missy Galvin DPT [facility]

## 2022-06-03 ENCOUNTER — APPOINTMENT (OUTPATIENT)
Dept: PHYSICAL THERAPY | Age: 47
End: 2022-06-03
Payer: COMMERCIAL

## 2022-06-08 ENCOUNTER — APPOINTMENT (OUTPATIENT)
Dept: PHYSICAL THERAPY | Age: 47
End: 2022-06-08
Payer: COMMERCIAL

## 2022-06-20 ENCOUNTER — HOSPITAL ENCOUNTER (OUTPATIENT)
Dept: PHYSICAL THERAPY | Age: 47
Discharge: HOME | End: 2022-06-20
Payer: COMMERCIAL

## 2022-06-20 PROCEDURE — 97014 ELECTRIC STIMULATION THERAPY: CPT | Performed by: PHYSICAL THERAPIST

## 2022-06-20 PROCEDURE — 97110 THERAPEUTIC EXERCISES: CPT | Performed by: PHYSICAL THERAPIST

## 2022-06-20 PROCEDURE — 97530 THERAPEUTIC ACTIVITIES: CPT | Performed by: PHYSICAL THERAPIST

## 2022-06-20 NOTE — PROGRESS NOTES
PT DAILY TREATMENT NOTE - [facility]     Patient Name: Franc Jurado  Date:2022  : 1975  [x]  Patient  Verified  Payor: BLUE CROSS / Plan: [employer] [facility] [place] / Product Type: PPO /    In time: 039  Out time: 1023  Total Treatment Time (min): 67  Total Timed Codes (min) 52  1:1 Treatment Time ( only): 46  Visit #: 4 of     Treatment Area: Left knee pain [M25.562]    SUBJECTIVE  Pain Level IN:(0-10 scale): 0/10  Pain Level OUT: (0-10 scale) post treatment:  0/10    Any medication changes, allergies to medications, adverse drug reactions, diagnosis change, or new procedure performed?: [x] No    [] Yes (see summary sheet for update)  Subjective functional status/changes:   [] No changes reported  Pt reports 70% improvement since SOC. Pt reports improvements in overall L LE strength and walking. He reports increased ability to ascend stairs. Functional limitations include descending stairs, running, pivoting, cutting drills. Pt reported having COVID on 6/3/22 and was out of PT for 10 quarantine.      OBJECTIVE    Modalities Rationale:     decrease edema, decrease inflammation and decrease pain to improve patient's ability to ambulate with normal gait pattern  15 min [x] Estim, type/location: To (L) knee  Ice to anterior / ICE to post                                    []  att     [x]  unatt     []  w/US     [x]  W/ice A/P    [x]  w/heat    min []  Mechanical Traction: type/lbs                   []  pro   []  sup   []  int   []  cont    []  before manual    []  after manual    min []  Ultrasound, settings/location:      min []  Iontophoresis w/ dexamethasone, location:                                               []  take home patch       []  in clinic    min []  Ice     []  Heat    location/position:     min []  Vasopneumatic Device, press/temp:     min []  Other:    [] Skin assessment post-treatment (if applicable):    []  intact    []  redness- no adverse reaction     []redness - adverse reaction:        12 min Therapeutic Exercise:  [x] See flow sheet :   Upright bike  Standing incline/HS stretch  3 way hip OTB   Rationale: increase ROM and increase strength to improve the patients ability to restore patient to normal ADLs    40 min Therapeutic Activity:  []  See flow sheet :   SLR x 3 with 3#  SL Bridge  Bridges  TG with heelraises lv 26  Minisquats on foam  Step up/lateral step ups 8\"  TG LV 26 DL   Eccentric step down 2\" - NT - pain  PT reassessment/FOTO   Rationale: increase ROM and increase strength  to improve the patients ability to ambulate with normal gait pattern when patient is full weight bearing        min Neuromuscular Re-education:  [x]  See flow sheet :     Rationale: improve coordination, improve balance and increase proprioception  to improve the patients ability to achieve all goals stated below      min Manual Therapy:     Rationale: decrease pain, increase ROM, increase tissue extensibility, decrease edema  and decrease trigger points to (L) knee           With   [] TE   [] TA   [] neuro   [] other: Patient Education: [x] Review HEP    [] Progressed/Changed HEP based on:   [] positioning   [] body mechanics   [] transfers   [] heat/ice application    [] other:      Objective/Functional Measures:  -Stairs: pt is able to ascend/descend 4 therapy steps with B UE with reduced eccentric strength on the L with increased pinching pain  - increased pain with eccentric step downs. Unable to perform secondary to pain  -FOTO: increased to 60/100 from 55/100 at last assessment    ASSESSMENT/Changes in Function:    Upon reassessment, pt continues to present with pain in the L knee with squatting and eccentric movements as he continues to have pain with descending stairs. Pt was not seen the last 2 weeks secondary to rachel COVID and thus limiting progression in therapy.  Pt would benefit from continued treatment to further address L quad strength to maximize functional mobility and return to running/ploymetric activities when able per MD protocol. Patient will continue to benefit from skilled PT services to modify and progress therapeutic interventions, address functional mobility deficits, address ROM deficits, address strength deficits, analyze and address soft tissue restrictions, analyze and cue movement patterns and assess and modify postural abnormalities to attain remaining goals. [x]  See Plan of Care  []  See progress note/recertification  []  See Discharge Summary         Progress towards goals / Updated goals:  1. Pt will improve FOTO score to at least 74/100 asa functional indicator of improved mobility. progressing, 6/20/22   2. Pt will improve L knee eccentric quad strength to 5/5 to be able to descend stairs with reciprocal pattern. Not met, continued pain 6/20/22   3. Pt will be able to ambulate community distances without AD demonstrating normalized gait pattern. met 6/20/22       PLAN  [x]  Upgrade activities as tolerated     [x]  Continue plan of care  []  Update interventions per flow sheet       []  Discharge due to:_  [x]  Other: Continue PT 2-3x/week for 4 weeks .     Amanda Camacho DPT 6/20/2022 1046 AM    Future Appointments   Date Time Provider Ayaz Kelly   6/22/2022  9:15 AM Radha Hale PT [facility]   6/27/2022  9:15 AM Katia Matute DPT [facility]   6/29/2022  9:15 AM Katia Matute DPT [facility]   7/6/2022  9:15 AM Katia Matute DPT [facility]   7/8/2022  9:15 AM Katia Matute DPT [facility]   7/11/2022  9:15 AM Radha Hale PT [facility]

## 2022-06-20 NOTE — PROGRESS NOTES
[address] [facility] AT [facility]  [address] [facility] [address] [place] [address],[address], Arnie Fan  Phone [phone]  Fax [phone]  PROGRESS NOTE  Patient Name: Verner Catchings : 1975   Treatment/Medical Diagnosis: Left knee pain [M25.562]   Referral Source: Daniel Mcgowan MD     Date of Initial Visit: 22 Attended Visits: 14 Missed Visits: 2 weeks secondary to covid     SUMMARY OF TREATMENT  Pt seen for 14 PT visits for L meniscal repair on 3/31/22. Therapy has closely followed MD protocol. CURRENT STATUS  Pt reports 70% improvement since [facility]. Pt reports improvements in overall L LE strength and walking. He reports increased ability to ascend stairs. Functional limitations include descending stairs, running, pivoting, cutting drills. Pt reported having COVID on 6/3/22 and was out of PT for 10 quarantine. Upon reassessment, pt continues to present with pain in the L knee with squatting and eccentric movements as he continues to have pain with descending stairs. Pt was not seen the last 2 weeks secondary to rachel COVID and thus limiting progression in therapy. Pt would benefit from continued treatment to further address L quad strength to maximize functional mobility and return to running/ploymetric activities when able per MD protocol.      Objective/Functional Measures:  -Stairs: pt is able to ascend/descend 4 therapy steps with B UE with reduced eccentric strength on the L with increased pinching pain  - increased pain with eccentric step downs.  Unable to perform secondary to pain  -FOTO: increased to 60/100 from 55/100 at last assessment     Objective/Functional Measures: taken on 22  AROM of the L knee 0-120 deg  FOTO: increased to 55/100 from 38/100 at eval  No quad lag with SLR  Pt was able to ambulate around the clinic without AD demonstrating antalgic gait pattern  Pt was able to ascend/descend 4 therapy steps demonstrating reciprocal pattern for ascent and step too pattern for descent      Goal/Measure of Progress Goal Met?        1.  Pt will improve FOTO score to at least 74/100 asa functional indicator of improved mobility. progressing, 6/20/22   2.  Pt will improve L knee eccentric quad strength to 5/5 to be able to descend stairs with reciprocal pattern. Not met, continued pain 6/20/22   3.  Pt will be able to ambulate community distances without AD demonstrating normalized gait pattern. met 6/20/22     New Goals to be achieved in __4__  weeks:  1. Pt will improve FOTO score to at least 74/100 asa functional indicator of improved mobility.    2.  Pt will improve L knee eccentric quad strength to 5/5 to be able to descend stairs with reciprocal pattern. RECOMMENDATIONS  Continue PT 2-3x/week for 8-12 visits to progress towards updated goals.     If you have any questions/comments please contact us directly at [phone]. Thank you for allowing us to assist in the care of your patient. Therapist Signature: Zafar Sykes DPT Date: 6/20/2022     Time: 6:03 PM   NOTE TO PHYSICIAN:  PLEASE COMPLETE THE ORDERS BELOW AND FAX TO   [facility] [place]: [phone]. If you are unable to process this request in 24 hours please contact our office: [phone].    ___ I have read the above report and request that my patient continue as recommended.   ___ I have read the above report and request that my patient continue therapy with the following changes/special instructions:_________________________________________________________   ___ I have read the above report and request that my patient be discharged from therapy.      Physician Signature:        Date:       Time:    Mary Spurling, MD

## 2022-06-22 ENCOUNTER — HOSPITAL ENCOUNTER (OUTPATIENT)
Dept: PHYSICAL THERAPY | Age: 47
Discharge: HOME | End: 2022-06-22
Payer: COMMERCIAL

## 2022-06-22 PROCEDURE — 97014 ELECTRIC STIMULATION THERAPY: CPT

## 2022-06-22 PROCEDURE — 97140 MANUAL THERAPY 1/> REGIONS: CPT

## 2022-06-22 PROCEDURE — 97110 THERAPEUTIC EXERCISES: CPT

## 2022-06-22 PROCEDURE — 97530 THERAPEUTIC ACTIVITIES: CPT

## 2022-06-22 NOTE — PROGRESS NOTES
PT DAILY TREATMENT NOTE - [facility]     Patient Name: Torsten Still  Date:2022  : 1975  [x]  Patient  Verified  Payor: BLUE CROSS / Plan: [employer] [facility] / Product Type: PPO /    In time: 9:15   Out time: 10:23   Total Treatment Time (min): 68   Total Timed Codes (min) 53   1:1 Treatment Time ( only): 40  Visit #: 1 of     Treatment Area: Left knee pain [M25.562]    SUBJECTIVE  Pain Level IN:(0-10 scale): 0 /10  Pain Level OUT: (0-10 scale) post treatment:  0  /10    Any medication changes, allergies to medications, adverse drug reactions, diagnosis change, or new procedure performed?: [x] No    [] Yes (see summary sheet for update)  Subjective functional status/changes:   [] No changes reported  The patient reports he was walking on gravel all day yesterday, states his knee was really achy afterwards. The patient reports when he is walking for 3 miles, he starts to feel increased pain and stiffness at about 1 mile.      OBJECTIVE    Modalities Rationale:     decrease edema, decrease inflammation and decrease pain to improve patient's ability to ambulate with normal gait pattern  15  min [x] Estim, type/location: To (L) knee  Ice to anterior / ICE to post                                    []  att     [x]  unatt     []  w/US     [x]  W/ice A/P    [x]  w/heat    min []  Mechanical Traction: type/lbs                   []  pro   []  sup   []  int   []  cont    []  before manual    []  after manual    min []  Ultrasound, settings/location:      min []  Iontophoresis w/ dexamethasone, location:                                               []  take home patch       []  in clinic    min []  Ice     []  Heat    location/position:     min []  Vasopneumatic Device, press/temp:     min []  Other:    [] Skin assessment post-treatment (if applicable):    []  intact    []  redness- no adverse reaction     []redness - adverse reaction:      Vasopnuematic compression justification:  Per bilateral girth measures taken and listed above the edema is considered significant and having an impact on the patient's gait      13  min Therapeutic Exercise:  [x] See flow sheet :   Upright bike  Standing incline/HS stretch  3 way hip OTB-HELD   LAQ with emphasis on eccentric lowering    Rationale: increase ROM and increase strength to improve the patients ability to restore patient to normal ADLs    30  min Therapeutic Activity:  []  See flow sheet :   SLR x flexion with emphasis on eccentric lowering   SL Bridge-TC  Bridges-TC  TG with heelraises lv 26  Mini squats on foam-HELD PAIN  Step up/lateral step ups 8\"-HELD   TG LV 26 DL    Eccentric step down 2\" - NT - pain   Rationale: increase ROM and increase strength  to improve the patients ability to ambulate with normal gait pattern when patient is full weight bearing        min Neuromuscular Re-education:  [x]  See flow sheet :     Rationale: improve coordination, improve balance and increase proprioception  to improve the patients ability to achieve all goals stated below     10 min Manual Therapy:  Cross friction to medial incision site, STM to along medial boarder of patella    Rationale: decrease pain, increase ROM, increase tissue extensibility, decrease edema  and decrease trigger points to (L) knee           With   [] TE   [] TA   [] neuro   [] other: Patient Education: [x] Review HEP    [] Progressed/Changed HEP based on:   [] positioning   [] body mechanics   [] transfers   [] heat/ice application    [] other:      Objective/Functional Measures:  HELD 3 way hip on foam, mini squats on foam and BOSU step ups secondary to pain      ASSESSMENT/Changes in Function:    The patient presents for first f/u since PN last visit. The patient reported increased pain with mini squats on foam and floor, held remaining standing exercises. Educated patient about gradually introducing extended walking to limit overuse.   Noted increased tenderness over medial incision site and medial boarder of patella. Progress as tolerated nv. Patient will continue to benefit from skilled PT services to modify and progress therapeutic interventions, address functional mobility deficits, address ROM deficits, address strength deficits, analyze and address soft tissue restrictions, analyze and cue movement patterns and assess and modify postural abnormalities to attain remaining goals. [x]  See Plan of Care  []  See progress note/recertification  []  See Discharge Summary         Progress towards goals / Updated goals:  New Goals to be achieved in __4__  weeks:  1. Pt will improve FOTO score to at least 74/100 asa functional indicator of improved mobility.    2.  Pt will improve L knee eccentric quad strength to 5/5 to be able to descend stairs with reciprocal pattern.            PLAN  [x]  Upgrade activities as tolerated     [x]  Continue plan of care  []  Update interventions per flow sheet       []  Discharge due to:_  [x]  Other: [address] [facility] [address] [place] [address], PT 6/22/2022 1046 AM    Future Appointments   Date Time Provider Ayaz Kelly   6/27/2022  9:15 AM Jocelin Stager, DPT [facility]   6/29/2022  9:15 AM Jocelin Stager, DPT [facility]   7/6/2022  9:15 AM Jocelin Stager, DPT [facility]   7/8/2022  9:15 AM Jocelin Stager, DPT [facility]   7/11/2022  9:15 AM Huan Flanagan, PT [facility]

## 2022-06-27 ENCOUNTER — HOSPITAL ENCOUNTER (OUTPATIENT)
Dept: PHYSICAL THERAPY | Age: 47
Discharge: HOME | End: 2022-06-27
Payer: COMMERCIAL

## 2022-06-27 PROCEDURE — 97014 ELECTRIC STIMULATION THERAPY: CPT | Performed by: PHYSICAL THERAPIST

## 2022-06-27 PROCEDURE — 97140 MANUAL THERAPY 1/> REGIONS: CPT | Performed by: PHYSICAL THERAPIST

## 2022-06-27 PROCEDURE — 97035 APP MDLTY 1+ULTRASOUND EA 15: CPT | Performed by: PHYSICAL THERAPIST

## 2022-06-27 NOTE — PROGRESS NOTES
PT DAILY TREATMENT NOTE - [facility]     Patient Name: Mariana Campbell  Date:2022  : 1975  [x]  Patient  Verified  Payor: BLUE CROSS / Plan: [employer] [facility] [place] / Product Type: PPO /    In time: 9:15   Out time: 10:10   Total Treatment Time (min): 55  Total Timed Codes (min) 40  1:1 Treatment Time ( only): 40  Visit #:2 of     Treatment Area: Left knee pain [M25.562]    SUBJECTIVE  Pain Level IN:(0-10 scale): 0 /10  Pain Level OUT: (0-10 scale) post treatment:  0  /10    Any medication changes, allergies to medications, adverse drug reactions, diagnosis change, or new procedure performed?: [x] No    [] Yes (see summary sheet for update)  Subjective functional status/changes:   [] No changes reported  Pt reports that his knee is still ticked off especially with squatting activities. It is better after last session.      OBJECTIVE    Modalities Rationale:     decrease edema, decrease inflammation and decrease pain to improve patient's ability to ambulate with normal gait pattern  15  min [x] Estim, type/location: To (L) knee  Ice to anterior / ICE to post                                    []  att     [x]  unatt     []  w/US     [x]  W/ice A/P    [x]  w/heat    min []  Mechanical Traction: type/lbs                   []  pro   []  sup   []  int   []  cont    []  before manual    []  after manual   8  min [x]  Ultrasound, settings/location:  L lateral knee     min []  Iontophoresis w/ dexamethasone, location:                                               []  take home patch       []  in clinic    min []  Ice     []  Heat    location/position:     min []  Vasopneumatic Device, press/temp:     min []  Other:    [] Skin assessment post-treatment (if applicable):    []  intact    []  redness- no adverse reaction     []redness - adverse reaction:      Vasopnuematic compression justification:  Per bilateral girth measures taken and listed above the edema is considered significant and having an impact on the patient's gait      h  min Therapeutic Exercise:  [x] See flow sheet :   Upright bike  Standing incline/HS stretch  3 way hip OTB-HELD   LAQ with emphasis on eccentric lowering    Rationale: increase ROM and increase strength to improve the patients ability to restore patient to normal ADLs    h min Therapeutic Activity:  []  See flow sheet :   SLR x flexion with emphasis on eccentric lowering   SL Bridge-TC  Bridges-TC  TG with heelraises lv 26  Mini squats on foam-HELD PAIN  Step up/lateral step ups 8\"-HELD   TG LV 26 DL    Eccentric step down 2\" - NT - pain   Rationale: increase ROM and increase strength  to improve the patients ability to ambulate with normal gait pattern when patient is full weight bearing       H min Neuromuscular Re-education:  [x]  See flow sheet :     Rationale: improve coordination, improve balance and increase proprioception  to improve the patients ability to achieve all goals stated below     32 min Manual Therapy:  Cross friction to medial incision site, STM to along medial boarder of patella Graston tool 4 to lateral quad and patellar tendon   Rationale: decrease pain, increase ROM, increase tissue extensibility, decrease edema  and decrease trigger points to (L) knee           With   [] TE   [] TA   [] neuro   [] other: Patient Education: [x] Review HEP    [] Progressed/Changed HEP based on:   [] positioning   [] body mechanics   [] transfers   [] heat/ice application    [] other:      Objective/Functional Measures:  Held all therex secondary to pain in the knee  Noted pain on the lateral side of the knee/quad . ASSESSMENT/Changes in Function:    Held therex secondary to increased pain in the knee with activity. Focused treatment on manual interventions. Assess effects NV. Pt educated to hold squatting activities. Will re-evaluate next visit.         Patient will continue to benefit from skilled PT services to modify and progress therapeutic interventions, address functional mobility deficits, address ROM deficits, address strength deficits, analyze and address soft tissue restrictions, analyze and cue movement patterns and assess and modify postural abnormalities to attain remaining goals. [x]  See Plan of Care  []  See progress note/recertification  []  See Discharge Summary         Progress towards goals / Updated goals:  New Goals to be achieved in __4__  weeks:  1.   Pt will improve FOTO score to at least 74/100 asa functional indicator of improved mobility.    2.  Pt will improve L knee eccentric quad strength to 5/5 to be able to descend stairs with reciprocal pattern.held therex this session secondary to pain 6/27/22           PLAN  [x]  Upgrade activities as tolerated     [x]  Continue plan of care  []  Update interventions per flow sheet       []  Discharge due to:_  []  Other:      Crow Hicks DPT 6/27/2022 1047 AM    Future Appointments   Date Time Provider Ayaz Kelly   6/29/2022  9:15 AM Stephanie Perkins DPT [facility]   7/6/2022  9:15 AM Stephanie Perkins DPT [facility]   7/8/2022  9:15 AM Stephanie Perkins DPT [facility]   7/11/2022  9:15 AM Marleny Yun PT [facility]

## 2022-06-29 ENCOUNTER — HOSPITAL ENCOUNTER (OUTPATIENT)
Dept: PHYSICAL THERAPY | Age: 47
Discharge: HOME | End: 2022-06-29
Payer: COMMERCIAL

## 2022-06-29 PROCEDURE — 97140 MANUAL THERAPY 1/> REGIONS: CPT | Performed by: PHYSICAL THERAPIST

## 2022-06-29 PROCEDURE — 97110 THERAPEUTIC EXERCISES: CPT | Performed by: PHYSICAL THERAPIST

## 2022-06-29 PROCEDURE — 97014 ELECTRIC STIMULATION THERAPY: CPT | Performed by: PHYSICAL THERAPIST

## 2022-06-29 NOTE — PROGRESS NOTES
PT DAILY TREATMENT NOTE - [facility]     Patient Name: Aki Lazcano  Date:2022  : 1975  [x]  Patient  Verified  Payor: BLUE CROSS / Plan:  [facility] [place] / Product Type: PPO /    In time: 9:30  Out time: 1030  Total Treatment Time (min): 60  Total Timed Codes (min) 45  1:1 Treatment Time ( only): 45  Visit #:3 of     Treatment Area: Left knee pain [M25.562]    SUBJECTIVE  Pain Level IN:(0-10 scale): 0-1 /10  Pain Level OUT: (0-10 scale) post treatment: 0 /10    Any medication changes, allergies to medications, adverse drug reactions, diagnosis change, or new procedure performed?: [x] No    [] Yes (see summary sheet for update)  Subjective functional status/changes:   [] No changes reported  Pt reports that he still has pain in the L knee especially when he steps down on it or squats. Squatting increases his symptoms.      OBJECTIVE    Modalities Rationale:     decrease edema, decrease inflammation and decrease pain to improve patient's ability to ambulate with normal gait pattern  15  min [x] Estim, type/location: To (L) knee  Ice to anterior / ICE to post                                    []  att     [x]  unatt     []  w/US     [x]  W/ice A/P    [x]  w/heat    min []  Mechanical Traction: type/lbs                   []  pro   []  sup   []  int   []  cont    []  before manual    []  after manual   H min [x]  Ultrasound, settings/location:  L lateral knee     min []  Iontophoresis w/ dexamethasone, location:                                               []  take home patch       []  in clinic    min []  Ice     []  Heat    location/position:     min []  Vasopneumatic Device, press/temp:     min []  Other:    [] Skin assessment post-treatment (if applicable):    []  intact    []  redness- no adverse reaction     []redness - adverse reaction:      Vasopnuematic compression justification:  Per bilateral girth measures taken and listed above the edema is considered significant and having an impact on the patient's gait      25  min Therapeutic Exercise:  [x] See flow sheet :   Upright bike  Standing incline/HS stretch  3 way hip   LAQ with emphasis on eccentric lowering   Prone hamstring curls   Rationale: increase ROM and increase strength to improve the patients ability to restore patient to normal ADLs    10 min Therapeutic Activity:  []  See flow sheet :   SLR x 3  SL Bridge-       Rationale: increase ROM and increase strength  to improve the patients ability to ambulate with normal gait pattern when patient is full weight bearing       H min Neuromuscular Re-education:  [x]  See flow sheet :     Rationale: improve coordination, improve balance and increase proprioception  to improve the patients ability to achieve all goals stated below     10 min Manual Therapy:  Graston to the L distal quad and patellar tendon   Rationale: decrease pain, increase ROM, increase tissue extensibility, decrease edema  and decrease trigger points to (L) knee           With   [] TE   [] TA   [] neuro   [] other: Patient Education: [x] Review HEP    [] Progressed/Changed HEP based on:   [] positioning   [] body mechanics   [] transfers   [] heat/ice application    [] other:      Objective/Functional Measures:  Pain in the patellar tendon with eccentric lowering of the LAQ  Stuck sensation felt with static stretches- pt reports needing to flex his knee excessively to get the sensation to go away. ASSESSMENT/Changes in Function:    Pt tolerated all therex without increased pain. Noted catching in the L knee with various static activities that improvement after changing positions. Continue to progress as tolerated. Pauline Kelly Dr activities held secondary to pain.       Patient will continue to benefit from skilled PT services to modify and progress therapeutic interventions, address functional mobility deficits, address ROM deficits, address strength deficits, analyze and address soft tissue restrictions, analyze and cue movement patterns and assess and modify postural abnormalities to attain remaining goals. [x]  See Plan of Care  []  See progress note/recertification  []  See Discharge Summary         Progress towards goals / Updated goals:  New Goals to be achieved in __4__  weeks:  1.   Pt will improve FOTO score to at least 74/100 asa functional indicator of improved mobility.    2.  Pt will improve L knee eccentric quad strength to 5/5 to be able to descend stairs with reciprocal pattern.held therex this session secondary to pain 6/27/22       PLAN  [x]  Upgrade activities as tolerated     [x]  Continue plan of care  []  Update interventions per flow sheet       []  Discharge due to:_  [x]  Other: check goals NV     Avis José DPT 6/29/2022 1101  AM    Future Appointments   Date Time Provider Ayaz Kelly   7/6/2022  9:15 AM Katy Zapata, DPT [facility]   7/8/2022  9:15 AM FELICIA HornT [facility]   7/11/2022  9:15 AM Joselito Puckett, PT [facility]

## 2022-07-06 ENCOUNTER — HOSPITAL ENCOUNTER (OUTPATIENT)
Dept: PHYSICAL THERAPY | Age: 47
Discharge: HOME | End: 2022-07-06
Payer: COMMERCIAL

## 2022-07-06 ENCOUNTER — APPOINTMENT (OUTPATIENT)
Dept: PHYSICAL THERAPY | Age: 47
End: 2022-07-06
Payer: COMMERCIAL

## 2022-07-06 PROCEDURE — 97014 ELECTRIC STIMULATION THERAPY: CPT

## 2022-07-06 PROCEDURE — 97110 THERAPEUTIC EXERCISES: CPT

## 2022-07-06 PROCEDURE — 97530 THERAPEUTIC ACTIVITIES: CPT

## 2022-07-06 NOTE — PROGRESS NOTES
PT DAILY TREATMENT NOTE - [facility]     Patient Name: Nohelia Marquez  Date:2022  : 1975  [x]  Patient  Verified  Payor: BLUE CROSS / Plan: Payment plugin [facility] / Product Type: PPO /    In time: 118  Out time: 221  Total Treatment Time (min): 63  Total Timed Codes (min) 51  1:1 Treatment Time ( only): 51   Visit #:4 of     Treatment Area: Left knee pain [M25.562]    SUBJECTIVE  Pain Level IN:(0-10 scale): 0 /10  Pain Level OUT: (0-10 scale) post treatment: 0 /10    Any medication changes, allergies to medications, adverse drug reactions, diagnosis change, or new procedure performed?: [x] No    [] Yes (see summary sheet for update)  Subjective functional status/changes:   [] No changes reported  Pt presents with new orders from surgeon with out precautions. Pt describes that on down strokes of upright bike he feels a 'pinching or aggravation behind the patella.   Pt also has c/o lateral L knee pain during SL squat on TG.      OBJECTIVE    Modalities Rationale:  decrease edema, decrease inflammation and decrease pain to improve patient's ability to ambulate with normal gait pattern  12  min [x] Estim, type/location: IFC To (L) knee  Ice to anterior / ICE to post                                    []  att     [x]  unatt     []  w/US     [x]  W/ice A/P    []  w/heat    min []  Mechanical Traction: type/lbs                   []  pro   []  sup   []  int   []  cont    []  before manual    []  after manual   H min [x]  Ultrasound, settings/location:  L lateral knee     min []  Iontophoresis w/ dexamethasone, location:                                               []  take home patch       []  in clinic    min []  Ice     []  Heat    location/position:     min []  Vasopneumatic Device, press/temp:     min []  Other:    [x] Skin assessment post-treatment (if applicable):    [x]  intact    []  redness- no adverse reaction     []redness - adverse reaction:      Vasopnuematic compression justification: Per bilateral girth measures taken and listed above the edema is considered significant and having an impact on the patient's gait      26 min Therapeutic Exercise:  [x] See flow sheet :   Upright bike  Standing incline/HS stretch  3 way hip   LAQ with emphasis on eccentric lowering   Prone hamstring curls   Rationale: increase ROM and increase strength to improve the patients ability to restore patient to normal ADLs    25 min Therapeutic Activity:  []  See flow sheet :   SLR x 3  SL Bridge-  SL TG, level 12     Rationale: increase ROM and increase strength  to improve the patients ability to ambulate with normal gait pattern when patient is full weight bearing       H min Neuromuscular Re-education:  [x]  See flow sheet :     Rationale: improve coordination, improve balance and increase proprioception  to improve the patients ability to achieve all goals stated below     X min Manual Therapy:    Rationale: decrease pain, increase ROM, increase tissue extensibility, decrease edema  and decrease trigger points to (L) knee           With   [] TE   [] TA   [] neuro   [] other: Patient Education: [x] Review HEP    [] Progressed/Changed HEP based on:   [] positioning   [] body mechanics   [] transfers   [] heat/ice application    [] other:      Objective/Functional Measures:  Pain lateral side of L knee during SL TG at level 12   Pain in the patellar tendon with eccentric lowering of the LAQ  Initiated SLS  AROM L knee flexion 135 deg    ASSESSMENT/Changes in Function:    Pt tolerates all therex with out increase in pain post session. Pt notes weakness in SL TG and SLS. Progress towards goals within Pt tolerance.         Patient will continue to benefit from skilled PT services to modify and progress therapeutic interventions, address functional mobility deficits, address ROM deficits, address strength deficits, analyze and address soft tissue restrictions, analyze and cue movement patterns and assess and modify postural abnormalities to attain remaining goals. [x]  See Plan of Care  []  See progress note/recertification  []  See Discharge Summary         Progress towards goals / Updated goals:  New Goals to be achieved in __4__  weeks:  1.   Pt will improve FOTO score to at least 74/100 asa functional indicator of improved mobility.    2.  Pt will improve L knee eccentric quad strength to 5/5 to be able to descend stairs with reciprocal pattern.held therex this session secondary to pain 6/27/22       PLAN  [x]  Upgrade activities as tolerated     [x]  Continue plan of care  []  Update interventions per flow sheet       []  Discharge due to:_  [x]  Other: New script with no precautions     Ashely Collier, SANTOS 7/6/2022 1101  AM    Future Appointments   Date Time Provider Ayaz Kelly   7/6/2022  1:15 PM [address] [facility]   7/8/2022  9:15 AM Chrissy Christie, PT [facility]   7/11/2022  9:15 AM Rostia Sweet, PT [facility]   7/13/2022 10:45 AM [facility] PT [place] [address] [place] [address] [facility]

## 2022-07-11 ENCOUNTER — HOSPITAL ENCOUNTER (OUTPATIENT)
Dept: PHYSICAL THERAPY | Age: 47
Discharge: HOME | End: 2022-07-11
Payer: COMMERCIAL

## 2022-07-11 PROCEDURE — 97530 THERAPEUTIC ACTIVITIES: CPT

## 2022-07-11 PROCEDURE — 97110 THERAPEUTIC EXERCISES: CPT

## 2022-07-11 PROCEDURE — 97140 MANUAL THERAPY 1/> REGIONS: CPT

## 2022-07-11 PROCEDURE — 97014 ELECTRIC STIMULATION THERAPY: CPT

## 2022-07-11 NOTE — PROGRESS NOTES
PT DAILY TREATMENT NOTE - [facility]     Patient Name: Ileana Trinidad  Date:2022  : 1975  [x]  Patient  Verified  Payor: BLUE CROSS / Plan:  [facility] / Product Type: PPO /    In time: 9:12  Out time: 10:18   Total Treatment Time (min): 66   Total Timed Codes (min) 41   1:1 Treatment Time ( only): 41   Visit #: 5 of     Treatment Area: Left knee pain [M25.562]    SUBJECTIVE  Pain Level IN:(0-10 scale): 0 /10  Pain Level OUT: (0-10 scale) post treatment: 0  /10    Any medication changes, allergies to medications, adverse drug reactions, diagnosis change, or new procedure performed?: [x] No    [] Yes (see summary sheet for update)  Subjective functional status/changes:   [] No changes reported  The patient states he continues to struggle with going down the stairs.      OBJECTIVE    Modalities Rationale:  decrease edema, decrease inflammation and decrease pain to improve patient's ability to ambulate with normal gait pattern  15  min [x] Estim, type/location: IFC To (L) knee  Ice to anterior / ICE to post                                    []  att     [x]  unatt     []  w/US     [x]  W/ice A/P    []  w/heat    min []  Mechanical Traction: type/lbs                   []  pro   []  sup   []  int   []  cont    []  before manual    []  after manual   H min [x]  Ultrasound, settings/location:  L lateral knee     min []  Iontophoresis w/ dexamethasone, location:                                               []  take home patch       []  in clinic    min []  Ice     []  Heat    location/position:     min []  Vasopneumatic Device, press/temp:     min []  Other:    [x] Skin assessment post-treatment (if applicable):    [x]  intact    []  redness- no adverse reaction     []redness - adverse reaction:      Vasopnuematic compression justification:  Per bilateral girth measures taken and listed above the edema is considered significant and having an impact on the patient's gait      15  min Therapeutic Exercise:  [x] See flow sheet :   Upright bike  Standing incline/HS stretch   3 way hip -tc  LAQ with emphasis on eccentric lowering   Prone hamstring curls-TC   Rationale: increase ROM and increase strength to improve the patients ability to restore patient to normal ADLs    10  min Therapeutic Activity:  []  See flow sheet :   SLR flexion, ABD   SL Bridge-TC  LLE SL TG, level 15 with active assist from RLE      Rationale: increase ROM and increase strength  to improve the patients ability to ambulate with normal gait pattern when patient is full weight bearing       8  min Neuromuscular Re-education:  [x]  See flow sheet :  SLS   Wall sit    Rationale: improve coordination, improve balance and increase proprioception  to improve the patients ability to achieve all goals stated below     8 min Manual Therapy: cross friction massage to L distal patellar tendon and patellar mobs    Rationale: decrease pain, increase ROM, increase tissue extensibility, decrease edema  and decrease trigger points to (L) knee           With   [] TE   [] TA   [] neuro   [] other: Patient Education: [x] Review HEP    [] Progressed/Changed HEP based on:   [] positioning   [] body mechanics   [] transfers   [] heat/ice application    [] other:      Objective/Functional Measures:  Updated HEP   Performed SINGLE LEG TG squats on with LLE 75% WB and RLE 25% WB   Noted increased tenderness along distal patellar tendon    Instructed patient to maintain active quad contraction with eccentric lowering during LAQ     ASSESSMENT/Changes in Function:    The patient performed single leg TG squats with partial WB through LLE secondary to c/o pain with left knee extension with full WB. Instructed patient to maintain active quad contraction with eccentric lowering during LAQ due to c/o pain at patellar tendon, improved symptoms following cue.  Introduced wall sits, patient required UE assistance for return to standing following 10 second hold at 60 deg knee flexion. Updated HEP. Continue to progress as tolerated nv. Patient will continue to benefit from skilled PT services to modify and progress therapeutic interventions, address functional mobility deficits, address ROM deficits, address strength deficits, analyze and address soft tissue restrictions, analyze and cue movement patterns and assess and modify postural abnormalities to attain remaining goals. [x]  See Plan of Care  []  See progress note/recertification  []  See Discharge Summary         Progress towards goals / Updated goals:  New Goals to be achieved in __4__  weeks:  1.   Pt will improve FOTO score to at least 74/100 asa functional indicator of improved mobility.    2.  Pt will improve L knee eccentric quad strength to 5/5 to be able to descend stairs with reciprocal pattern.held therex this session secondary to pain 6/27/22, pain with all eccentric quad exercises today 07/11/2022       PLAN  [x]  Upgrade activities as tolerated     [x]  Continue plan of care  []  Update interventions per flow sheet       []  Discharge due to:_  [x]  Other: progress as tolerated nv      Rafat Moran, PT 7/11/2022 1101  AM    Future Appointments   Date Time Provider Ayaz Kelly   7/13/2022 10:45 AM [facility] PT [place] [facility]

## 2022-07-13 ENCOUNTER — HOSPITAL ENCOUNTER (OUTPATIENT)
Dept: PHYSICAL THERAPY | Age: 47
Discharge: HOME | End: 2022-07-13
Payer: COMMERCIAL

## 2022-07-13 PROCEDURE — 97014 ELECTRIC STIMULATION THERAPY: CPT

## 2022-07-13 PROCEDURE — 97110 THERAPEUTIC EXERCISES: CPT

## 2022-07-13 PROCEDURE — 97530 THERAPEUTIC ACTIVITIES: CPT

## 2022-07-13 PROCEDURE — 97140 MANUAL THERAPY 1/> REGIONS: CPT

## 2022-07-13 NOTE — PROGRESS NOTES
PT DAILY TREATMENT NOTE - [facility]     Patient Name: Kristyn Farmer  Date:2022  : 1975  [x]  Patient  Verified  Payor: Tressa Callahan / Plan:  [facility] [place] / Product Type: PPO /    In time: 10:53 Out time: 11:48  Total Treatment Time (min):55   Total Timed Codes (min) 40   1:1 Treatment Time ( only): 40  Visit #:   6 of     Treatment Area: Left knee pain [M25.562]    SUBJECTIVE  Pain Level IN:(0-10 scale): 210  Pain Level OUT: (0-10 scale) post treatment: 10    Any medication changes, allergies to medications, adverse drug reactions, diagnosis change, or new procedure performed?: [x] No    [] Yes (see summary sheet for update)  Subjective functional status/changes:   [] No changes reported  \"More irritated today secondary to training for 2 days and going to [facility]. \"    OBJECTIVE    Modalities Rationale:  decrease edema, decrease inflammation and decrease pain to improve patient's ability to ambulate with normal gait pattern  15  min [x] Estim, type/location: IFC To (L) knee Ice to anterior / ICE to post                                    []  att     [x]  unatt     []  w/US     [x]  W/ice A/P    []  w/heat    min []  Mechanical Traction: type/lbs                   []  pro   []  sup   []  int   []  cont    []  before manual    []  after manual    min [x]  Ultrasound, settings/location:      min []  Iontophoresis w/ dexamethasone, location:                                               []  take home patch       []  in clinic    min []  Ice     []  Heat    location/position:     min []  Vasopneumatic Device, press/temp:     min []  Other:    [x] Skin assessment post-treatment (if applicable):    [x]  intact    []  redness- no adverse reaction     []redness - adverse reaction:      Vasopnuematic compression justification:  Per bilateral girth measures taken and listed above the edema is considered significant and having an impact on the patient's gait      14  min Therapeutic Exercise:  [x] See flow sheet :   Upright bike  Standing incline/HS stretch   3 way hip -tc  LAQ with emphasis on eccentric lowering   Prone hamstring curls-TC   Rationale: increase ROM and increase strength to improve the patients ability to restore patient to normal ADLs    10  min Therapeutic Activity:  []  See flow sheet :   SLR flexion, ABD   SL Bridge-  LLE SL TG, level 15 with active assist from RLE    Rationale: increase ROM and increase strength  to improve the patients ability to ambulate with normal gait pattern when patient is full weight bearing       8  min Neuromuscular Re-education:  [x]  See flow sheet :  SLS on foam  Wall sit - Hold   Rationale: improve coordination, improve balance and increase proprioception  to improve the patients ability to achieve all goals stated below     8 min Manual Therapy: cross friction massage to L distal patellar tendon and patellar mobs    Rationale: decrease pain, increase ROM, increase tissue extensibility, decrease edema  and decrease trigger points to (L) knee           With   [] TE   [] TA   [] neuro   [] other: Patient Education: [x] Review HEP    [] Progressed/Changed HEP based on:   [] positioning   [] body mechanics   [] transfers   [] heat/ice application    [] other:      Objective/Functional Measures  Noted increased tenderness along distal patellar tendon        ASSESSMENT/Changes in Function:  Presents with increased L knee joint pain and discomfort today secondary to increase in [address] activity levels. Approx after 9 reps of LAQs; pt reported pinching at patellar tendon. Educated pt in overuse, proper rest, not pushing through sharp/pinching pain and ice to patellar tendon/ant knee.         Patient will continue to benefit from skilled PT services to modify and progress therapeutic interventions, address functional mobility deficits, address ROM deficits, address strength deficits, analyze and address soft tissue restrictions, analyze and cue movement patterns and assess and modify postural abnormalities to attain remaining goals. [x]  See Plan of Care  []  See progress note/recertification  []  See Discharge Summary         Progress towards goals / Updated goals:  New Goals to be achieved in __4__  weeks:  1.   Pt will improve FOTO score to at least 74/100 asa functional indicator of improved mobility.    2.  Pt will improve L knee eccentric quad strength to 5/5 to be able to descend stairs with reciprocal pattern.held therex this session secondary to pain 6/27/22, pain with all eccentric quad exercises today 07/11/2022       PLAN  [x]  Upgrade activities as tolerated     [x]  Continue plan of care  []  Update interventions per flow sheet       []  Discharge due to:_  [x]  Other: progress as tolerated nv      Kimberli Angelos V, PTA 7/13/2022 1101  AM    Future Appointments   Date Time Provider Ayaz Kelly   7/13/2022 10:45 AM [facility]   7/25/2022  9:15 AM Radha Ora, PT [facility]   7/27/2022  9:15 AM Dennise García DPT [facility]   8/1/2022  9:15 AM Radha Ora, PT [facility]   8/3/2022  9:15 AM Radha Ora, PT [facility]   8/15/2022  9:15 AM Radha Ora, PT [facility]   8/17/2022  9:15 AM Radha Ora, PT [facility]   8/29/2022  9:15 AM Radha Ora, PT [facility]   8/31/2022  9:15 AM Radha Ora, PT [facility]

## 2022-07-25 ENCOUNTER — HOSPITAL ENCOUNTER (OUTPATIENT)
Dept: PHYSICAL THERAPY | Age: 47
Discharge: HOME | End: 2022-07-25
Payer: COMMERCIAL

## 2022-07-25 PROCEDURE — 97530 THERAPEUTIC ACTIVITIES: CPT | Performed by: PHYSICAL THERAPIST

## 2022-07-25 NOTE — PROGRESS NOTES
PT DAILY TREATMENT NOTE - [facility]     Patient Name: Kristyn Farmer  Date:2022  : 1975  [x]  Patient  Verified  Payor: BLUE CROSS / Plan: [employer] [facility] [place] / Product Type: PPO /    In time: 921 Out time: 950  Total Treatment Time (min):29  Total Timed Codes (min) 29  1:1 Treatment Time ( only): 29  Visit #:  7  of     Treatment Area: Left knee pain [M25.562]    SUBJECTIVE  Pain Level IN:(0-10 scale): 1/10  Pain Level OUT: (0-10 scale) post treatment: 3/10    Any medication changes, allergies to medications, adverse drug reactions, diagnosis change, or new procedure performed?: [x] No    [] Yes (see summary sheet for update)  Subjective functional status/changes:   [] No changes reported  Pt reports he continues to have pain in the R knee on the medial aspect of the knee. Functional limitations include walking down stairs increases pain the in the front of his knee, when he rides the bike he has pain in the back and front of the knee, walking on uneven surfaces, running, and squatting. Overall he just feels like he has no stability secondary to pain. He is concerned that he is still having pain and he is still can't do high level activities. He reports that he was on vacation the last 10 days and he has really relaxing and his knee still hurts.      OBJECTIVE    Modalities Rationale:  decrease edema, decrease inflammation and decrease pain to improve patient's ability to ambulate with normal gait pattern  H min [x] Estim, type/location: IFC To (L) knee Ice to anterior / ICE to post                                    []  att     [x]  unatt     []  w/US     [x]  W/ice A/P    []  w/heat    min []  Mechanical Traction: type/lbs                   []  pro   []  sup   []  int   []  cont    []  before manual    []  after manual    min [x]  Ultrasound, settings/location:      min []  Iontophoresis w/ dexamethasone, location:                                               []  take home patch       [] in clinic    min []  Ice     []  Heat    location/position:     min []  Vasopneumatic Device, press/temp:     min []  Other:    [x] Skin assessment post-treatment (if applicable):    [x]  intact    []  redness- no adverse reaction     []redness - adverse reaction:      Vasopnuematic compression justification:  Per bilateral girth measures taken and listed above the edema is considered significant and having an impact on the patient's gait    29  min Therapeutic Activity:  []  See flow sheet :   PT reassessment, FOTO, contacted MD   Rationale: increase ROM and increase strength  to improve the patients ability to ambulate with normal gait pattern when patient is full weight bearing           With   [] TE   [] TA   [] neuro   [] other: Patient Education: [x] Review HEP    [] Progressed/Changed HEP based on:   [] positioning   [] body mechanics   [] transfers   [] heat/ice application    [] other:      Objective/Functional Measures  FOTO increased to 66/100 from 60/100  Noted eccentric weakness as he has pain and catching in the L knee with descending stairs. Squats: he was able to perform 4 reps without increased pain. Noted medial shift to the R leg to reduce weight-bearing through the L LE.   B hip strength: grossly 5/5 B  Noted tender to palpation: over the R medial joint line and incision    ASSESSMENT/Changes in Function:    Upon reassessment, pt continues to have pain in the L knee with eccentric contractions of the L quad and with increased stress to the medial meniscus on the L. At this time he presents with full hip strength and full AROM of the L knee. We are unable to progress to plyometric strengthening as well as higher level functional strength training to return to running to be able to go back to work due to increased pain in the knee. Pt to hold on therapy due to the lack of ability to progress until FU with MD for further evaluation of symptoms. Will away MD recommendations.       Patient will continue to benefit from skilled PT services to modify and progress therapeutic interventions, address functional mobility deficits, address ROM deficits, address strength deficits, analyze and address soft tissue restrictions, analyze and cue movement patterns and assess and modify postural abnormalities to attain remaining goals. [x]  See Plan of Care  []  See progress note/recertification  []  See Discharge Summary         Progress towards goals / Updated goals:  New Goals to be achieved in __4__  weeks:  1. Pt will improve FOTO score to at least 74/100 asa functional indicator of improved mobility. progressing 66/100 7/25/22   2. Pt will improve L knee eccentric quad strength to 5/5 to be able to descend stairs with reciprocal pattern. unable to perform secondary to pain in the knee 7/25/22       PLAN  [x]  Upgrade activities as tolerated     [x]  Continue plan of care  []  Update interventions per flow sheet       []  Discharge due to:_  [x]  Other: pt on hold until FU with MD secondary to increased pain and inability to progress to higher level exercises.  Will continue PT 2x/week for 4 weeks if recommended by MD.      Tay Dalton DPT 7/25/2022 1101  AM    Future Appointments   Date Time Provider Ayaz Kelly   7/27/2022  9:15 AM Cristi Alfaro DPT [facility]   8/1/2022  9:15 AM Meño Fry, PT [facility]   8/3/2022  9:15 AM Meño Fry, PT [facility]   8/15/2022  9:15 AM Meño Fry, PT [facility]   8/17/2022  9:15 AM Winddex Fry, PT [facility]   8/29/2022  9:15 AM Meño Fry, PT [facility]   8/31/2022  9:15 AM Meño Fry, PT [facility]

## 2022-07-26 NOTE — PROGRESS NOTES
[address] [facility]YS[facility] THERAPY AT [facility]  [address] [facility] [address] [place] [address],[address], Terrance jenkins, [place]  Phone [phone]  Fax [phone]  PROGRESS NOTE  Patient Name: Alexsander Chaney : 1975   Treatment/Medical Diagnosis: Left knee pain [M25.562]   Referral Source: Shea Hill MD     Date of Initial Visit: 22 Attended Visits: 21 Missed Visits: 2 weeks secondary to covid     SUMMARY OF TREATMENT  Pt seen for 21 PT visits for L meniscal repair on 3/31/22. Therapy has closely followed MD protocol. CURRENT STATUS  Pt reports he continues to have pain in the R knee on the medial aspect of the knee. Functional limitations include walking down stairs increases pain the in the front of his knee, when he rides the bike he has pain in the back and front of the knee, walking on uneven surfaces, running, and squatting. Overall he just feels like he has no stability secondary to pain. He is concerned that he is still having pain and he is still can't do high level activities. He reports that he was on vacation the last 10 days and he has really relaxing and his knee still hurts. Upon reassessment, pt continues to have pain in the L knee with eccentric contractions of the L quad and with increased stress to the medial meniscus on the L. At this time he presents with full hip strength and full AROM of the L knee. We are unable to progress to plyometric strengthening as well as higher level functional strength training to return to running to be able to go back to work due to increased pain in the knee. Pt to hold on therapy due to the lack of ability to progress until FU with MD for further evaluation of symptoms. Will away MD recommendations. Objective/Functional Measures  FOTO increased to 66/100 from 60/100  Noted eccentric weakness as he has pain and catching in the L knee with descending stairs. Squats: he was able to perform 4 reps without increased pain.  Noted medial shift to the R leg to reduce weight-bearing through the L LE.  B hip strength: grossly 5/5 B  Noted tender to palpation: over the R medial joint line and incision     Objective/Functional Measures: taken on 6/20/22  -Stairs: pt is able to ascend/descend 4 therapy steps with B UE with reduced eccentric strength on the L with increased pinching pain  - increased pain with eccentric step downs. Unable to perform secondary to pain  -FOTO: increased to 60/100 from 55/100 at last assessment       Goal/Measure of Progress Goal Met? 1. Pt will improve FOTO score to at least 74/100 asa functional indicator of improved mobility. progressing 66/100 7/25/22   2. Pt will improve L knee eccentric quad strength to 5/5 to be able to descend stairs with reciprocal pattern. unable to perform secondary to pain in the knee 7/25/22       New Goals to be achieved in __4__  weeks:  1. Pt will improve FOTO score to at least 74/100 asa functional indicator of improved mobility. 2.  Pt will improve L knee eccentric quad strength to 5/5 to be able to descend stairs with reciprocal pattern. RECOMMENDATIONS  Pt on hold until FU with MD secondary to increased pain and inability to progress to higher level exercises. Will continue PT 2x/week for 4 weeks if recommended by MD.      If you have any questions/comments please contact us directly at [phone]. Thank you for allowing us to assist in the care of your patient. Therapist Signature: Tay Dalton DPT Date: 7/25/2022     Time: 6:03 PM   NOTE TO PHYSICIAN:  PLEASE COMPLETE THE ORDERS BELOW AND FAX TO   [facility]: [phone].   If you are unable to process this request in 24 hours please contact our office: [phone].    ___ I have read the above report and request that my patient continue as recommended.   ___ I have read the above report and request that my patient continue therapy with the following changes/special instructions:_________________________________________________________   ___ I have read the above report and request that my patient be discharged from therapy.      Physician Signature:        Date:       Time:    Shea Hill MD

## 2022-07-27 ENCOUNTER — APPOINTMENT (OUTPATIENT)
Dept: PHYSICAL THERAPY | Age: 47
End: 2022-07-27
Payer: COMMERCIAL

## 2022-08-01 ENCOUNTER — APPOINTMENT (OUTPATIENT)
Dept: PHYSICAL THERAPY | Age: 47
End: 2022-08-01

## 2022-08-03 ENCOUNTER — APPOINTMENT (OUTPATIENT)
Dept: PHYSICAL THERAPY | Age: 47
End: 2022-08-03

## 2022-08-09 ENCOUNTER — TRANSCRIBE ORDER (OUTPATIENT)
Dept: SCHEDULING | Age: 47
End: 2022-08-09

## 2022-08-09 DIAGNOSIS — Z13.6 ENCOUNTER FOR SCREENING FOR CARDIOVASCULAR DISORDERS: Primary | ICD-10-CM

## 2022-08-15 ENCOUNTER — APPOINTMENT (OUTPATIENT)
Dept: PHYSICAL THERAPY | Age: 47
End: 2022-08-15

## 2022-08-15 ENCOUNTER — HOSPITAL ENCOUNTER (OUTPATIENT)
Dept: CT IMAGING | Age: 47
Discharge: HOME | End: 2022-08-15
Attending: INTERNAL MEDICINE
Payer: SELF-PAY

## 2022-08-15 DIAGNOSIS — Z13.6 ENCOUNTER FOR SCREENING FOR CARDIOVASCULAR DISORDERS: ICD-10-CM

## 2022-08-15 PROCEDURE — 75571 CT HRT W/O DYE W/CA TEST: CPT

## 2022-08-17 ENCOUNTER — APPOINTMENT (OUTPATIENT)
Dept: PHYSICAL THERAPY | Age: 47
End: 2022-08-17

## 2022-08-23 ENCOUNTER — TELEPHONE (OUTPATIENT)
Dept: OTHER | Age: 47
End: 2022-08-23

## 2022-08-24 ENCOUNTER — TELEPHONE (OUTPATIENT)
Dept: OTHER | Age: 47
End: 2022-08-24

## 2022-08-29 ENCOUNTER — APPOINTMENT (OUTPATIENT)
Dept: PHYSICAL THERAPY | Age: 47
End: 2022-08-29

## 2022-08-31 ENCOUNTER — APPOINTMENT (OUTPATIENT)
Dept: PHYSICAL THERAPY | Age: 47
End: 2022-08-31

## 2022-10-18 ENCOUNTER — HOSPITAL ENCOUNTER (OUTPATIENT)
Dept: PHYSICAL THERAPY | Age: 47
Discharge: HOME | End: 2022-10-18
Payer: COMMERCIAL

## 2022-10-18 PROCEDURE — 97140 MANUAL THERAPY 1/> REGIONS: CPT | Performed by: PHYSICAL THERAPIST

## 2022-10-18 PROCEDURE — 97162 PT EVAL MOD COMPLEX 30 MIN: CPT | Performed by: PHYSICAL THERAPIST

## 2022-10-18 PROCEDURE — 97035 APP MDLTY 1+ULTRASOUND EA 15: CPT | Performed by: PHYSICAL THERAPIST

## 2022-10-18 NOTE — PROGRESS NOTES
[address] PHYSICAL THERAPY AT [facility]  [address] [facility] [address] [facility] [address], Terrance jenkins, [place]  Phone [phone]  Fax [phone] / [address] [place] [address] PHYSICAL THERAPY SERVICES  Patient Name: Hero Cano : 1975   Medical   Diagnosis: Left knee pain [M25.562] Treatment Diagnosis: L knee pain   Onset Date: 10/6/22 DOS     Referral Source: Allan Khanna MD Start of Care [facility]): 10/18/2022   Prior Hospitalization: See medical history Provider #: [id number]   Prior Level of Function: IND    Comorbidities: Previous L meniscus repair, HBP   Medications: Verified on Patient Summary List   The Plan of Care and following information is based on the information from the initial evaluation.   =================================================================================  Assessment / key information:  Pt is a 42year old male sp L knee menisectomy on 10/6/22. He was seen earlier this year sp Meniscus repair on 3/31/22. He was seen in PT until 22 but was still having a lot of pain in the knee. That was when it was decided to have another surgery. Currently, he rates his L knee a 1/10. Functional limitations include all weight-bearing activities including walking. He continues to wear brace on the L knee and is using 1 axillary crutch on the R. He is currently on light duty at work. Negative for red flags. FOTO: 44/100. Upon evaluation, pt ambulates with R axillary crutch and brace on the L LE demonstrating reciprocal pattern. AROM of the L knee is as follows: 0-120 deg. Incisions are healing, no signs of infection noted. Noted distal patellar swelling with tenderness over the L pes anserine. B hip flexion strength is grossly a 4+/5, abduction: 4/5, extension 4+/5, L Hamstring strength: 4/5 with pain. Sit to stand/stair test not performed secondary to TC. Will assess NV.  Pt would benefit from a course of skilled PT to address above deficits to return to PLOF symptom free.       =================================================================================  Eval Complexity: History: MEDIUM  Complexity : 1-2 comorbidities / personal factors will impact the outcome/ POC Exam:HIGH Complexity : 4+ Standardized tests and measures addressing body structure, function, activity limitation and / or participation in recreation  Presentation: MEDIUM Complexity : Evolving with changing characteristics  Clinical Decision Making:MEDIUM Complexity : FOTO score of 26-74Overall Complexity:MEDIUM  Problem List: pain affecting function, decrease ROM, decrease strength, edema affecting function, impaired gait/ balance, decrease ADL/ functional abilitiies, decrease activity tolerance, decrease flexibility/ joint mobility, and decrease transfer abilities   Treatment Plan may include any combination of the following: Therapeutic exercise, Neuromuscular reeducation, Manual therapy, Therapeutic activity, Electric stim unattended , Vasopneumatic device, Gait training, Ultrasound, and Electric stim attended  Patient / Family readiness to learn indicated by: asking questions and trying to perform skills  Persons(s) to be included in education: patient (P)  Barriers to Learning/Limitations: None  Measures taken:    Patient Goal (s): Get full ROM back and strength   Patient self reported health status: good  Rehabilitation Potential: good  Short Term Goals: To be accomplished in  2  weeks:  1. Pt will be IND and compliant with HEP for self-management of symptoms. Long Term Goals: To be accomplished in  4  weeks:  1. Pt will improve L knee AROM 0-40 deg to perform deep squat for work related activities  2. Pt will improve B hip strength to 5/5 to improve gait stability. 3. Pt will be able to ambulate community distances   4. Pt will improve FOTO score to at least 64/100 asa functional indicator of improved mobility.    Frequency / Duration:   Patient to be seen  2-3  times per week for 4  weeks: (All LTG as above will be assessed and updated every 10 visits or 30 days and progressed as needed)    Patient / Caregiver education and instruction: exercises  Therapist Signature: Stephanie Cedeno DPT Date: 22/76/9244   Certification Period: NA Time: 12:35 PM   ===========================================================================================  I certify that the above Physical Therapy Services are being furnished while the patient is under my care. I agree with the treatment plan and certify that this therapy is necessary. Physician Signature:        Date:       Time:     Please sign and return to [facility] or you may fax the signed copy to [phone]. Thank you.   Trey Alvarenga MD

## 2022-10-18 NOTE — PROGRESS NOTES
PT DAILY TREATMENT NOTE     Patient Name: Santhosh Nguyễn  Date:10/18/2022  : 1975  [x]  Patient  Verified  Payor: BLUE CROSS / Plan: [facility] [place] / Product Type: PPO /    In time:1051  Out time:1141  Total Treatment Time (min): 46  Visit #: 1 of     Medicare/BCBS Only   Total Timed Codes (min):  25 1:1 Treatment Time:  51       Treatment Area: Left knee pain [M25.562]    SUBJECTIVE  Pain Level (0-10 scale): 2/10  Any medication changes, allergies to medications, adverse drug reactions, diagnosis change, or new procedure performed?: [x] No    [] Yes (see summary sheet for update)  Subjective functional status/changes:   [] No changes reported  Pt is a 42year old male sp L knee menisectomy on 10/6/22. He was seen earlier this year sp Meniscus repair on 3/31/22. He was seen in PT until 22 but was still having a lot of pain in the knee. That was when it was decided to have another surgery. Currently, he rates his L knee a 1/10. Functional limitations include all weight-bearing activities including walking. He continues to wear brace on the L knee and is using 1 axillary crutch on the R. He is currently on light duty at work. Negative for red flags. FOTO: 44/100.      OBJECTIVE    Modality rationale: decrease edema, decrease inflammation, and decrease pain to improve the patients ability to improve post session soreness    Min Type Additional Details    [] Estim:  []Unatt       []IFC  []Premod                        []Other:  []w/ice   []w/heat  Position:  Location:    [] Estim: []Att    []TENS instruct  []NMES                    []Other:  []w/US   []w/ice   []w/heat  Position:  Location:    []  Traction: [] Cervical       []Lumbar                       [] Prone          []Supine                       []Intermittent   []Continuous Lbs:  [] before manual  [] after manual   8 [x]  Ultrasound: []Continuous   [x] Pulsed                           [x]1MHz   []3MHz W/cm2: 1.10  Location: L Pes Anserine    []  Iontophoresis with dexamethasone         Location: [] Take home patch   [] In clinic   With development of HEP [x]  Ice     []  heat  []  Ice massage  []  Laser   []  Anodyne Position: semi- reclined  Location: L knee     []  Laser with stim  []  Other:  Position:  Location:    []  Vasopneumatic Device  Pre-treatment girth:   Post-treatment girth:   Measured at landmark location:  Pressure:       [] lo [] med [] hi   Temperature: [] lo [] med [] hi   [] Skin assessment post-treatment:  []intact []redness- no adverse reaction    []redness - adverse reaction:   Vasopnuematic compression justification:  Per bilateral girth measures taken and listed above the edema is considered significant and having an impact on the patient's strength, balance, gait, transfers, self care, and ADLs    26 min [x]Eval                  []Re-Eval       5 min Therapeutic Exercise:  [x] See flow sheet : established/demonstrated/reviewed with patient HEP   Rationale: increase ROM and increase strength to improve the patients ability to improve functional mobility     12 min Manual Therapy:  XFM L  Pes Anserine   The manual therapy interventions were performed at a separate and distinct time from the therapeutic activities interventions. Rationale: decrease pain, increase ROM, and increase tissue extensibility to improve R knee mobility with less pain         With   [] TE   [] TA   [] neuro   [] other: Patient Education: [x] Review HEP    [] Progressed/Changed HEP based on:   [] positioning   [] body mechanics   [] transfers   [] heat/ice application    [] other:      Other Objective/Functional Measures:   Upon evaluation, pt ambulates with R axillary crutch and brace on the L LE demonstrating reciprocal pattern. AROM of the L knee is as follows: 0-120 deg. Incisions are healing, no signs of infection noted. Noted distal patellar swelling with tenderness over the L pes anserine.  B hip flexion strength is grossly a 4+/5, abduction: 4/5, extension 4+/5, L Hamstring strength: 4/5 with pain. Sit to stand/stair test not performed secondary to TC. Will assess NV. Fall Risk Assessment: Does the patient have a fear of falling? Yes If yes, what fall risk assessment was performed? Instability and use of the crutch. Functional B LE strength test/gait analysis.       Pain Level (0-10 scale) post treatment: 0/10    ASSESSMENT/Changes in Function:   [x]  See Plan of Care  []  See progress note/recertification  []  See Discharge Summary         Progress towards goals / Updated goals:  PER POC    PLAN  [x]  Upgrade activities as tolerated     [x]  Continue plan of care  []  Update interventions per flow sheet       []  Discharge due to:_  [x]  Other:_ Pt 2-3x/week for 4 weeks    Justification for Eval Code Complexity:  Patient History : chronicity, recent surgery on same kne 6 months ago  Examination see exam   Clinical Presentation: evolving  Clinical Decision Making : FOTO : Solo Daniels DPT 10/18/2022  1234 PM    Future Appointments   Date Time Provider Ayaz Kelly   10/25/2022 10:00 AM Salma Sanchez PTA [facility]   10/28/2022  2:45 PM Radha Corona PT [facility]   11/1/2022  9:15 AM Jasmine Esposito DPT [facility]   11/4/2022  9:15 AM Radha Corona PT [facility]   11/8/2022 11:30 AM Salma Sanchez PTA [facility]   11/11/2022  9:15 AM Jasmine Esposito DPT [facility]   11/15/2022  5:00 PM Bhavik Tyler [facility]   11/18/2022  9:15 AM FELICIA FigueroaT [facility]

## 2022-10-25 ENCOUNTER — HOSPITAL ENCOUNTER (OUTPATIENT)
Dept: PHYSICAL THERAPY | Age: 47
Discharge: HOME | End: 2022-10-25
Payer: COMMERCIAL

## 2022-10-25 PROCEDURE — 97110 THERAPEUTIC EXERCISES: CPT | Performed by: PHYSICAL THERAPIST

## 2022-10-25 PROCEDURE — 97140 MANUAL THERAPY 1/> REGIONS: CPT | Performed by: PHYSICAL THERAPIST

## 2022-10-25 PROCEDURE — 97530 THERAPEUTIC ACTIVITIES: CPT | Performed by: PHYSICAL THERAPIST

## 2022-10-25 NOTE — PROGRESS NOTES
PT DAILY TREATMENT NOTE     Patient Name: Elvira Gonzalez  Date:10/25/2022  : 1975  [x]  Patient  Verified  Payor: BLUE CROSS / Plan: [employer] [facility] [place] / Product Type: PPO /    In time:1008  Out time:1119  Total Treatment Time (min): 71  Visit #: 2 of     Medicare/BCBS Only   Total Timed Codes (min):  56 1:1 Treatment Time:  48       Treatment Area: Left knee pain [M25.562]    SUBJECTIVE  Pain Level (0-10 scale): 1/10  Any medication changes, allergies to medications, adverse drug reactions, diagnosis change, or new procedure performed?: [x] No    [] Yes (see summary sheet for update)  Subjective functional status/changes:   [] No changes reported  Pt reports that he is feeling better. He notes that he has not had to wear his brace in a few days and he stopped using his crutch today. OBJECTIVE    Modality rationale: decrease edema, decrease inflammation, and decrease pain to improve the patients ability to reduce pain with standing activities.     Min Type Additional Details    [] Estim:  []Unatt       []IFC  []Premod                        []Other:  []w/ice   []w/heat  Position:  Location:    [] Estim: []Att    []TENS instruct  []NMES                    []Other:  []w/US   []w/ice   []w/heat  Position:  Location:    []  Traction: [] Cervical       []Lumbar                       [] Prone          []Supine                       []Intermittent   [][]Continuous Lbs:   before manual  [] after manual    []  Ultrasound: []Continuous   [] Pulsed                           []1MHz   []3MHz W/cm2:  Location:    []  Iontophoresis with dexamethasone         Location: [] Take home patch   [] In clinic   15 [x]  Ice     []  heat  []  Ice massage  []  Laser   []  Anodyne Position: semi reclined  Location: L knee    []  Laser with stim  []  Other:  Position:  Location:    []  Vasopneumatic Device  Pre-treatment girth:   Post-treatment girth:   Measured at landmark location:  Pressure:       [] lo [] med [] hi   Temperature: [] lo [] med [] hi   [] Skin assessment post-treatment:  []intact []redness- no adverse reaction    []redness - adverse reaction:   Vasopnuematic compression justification:  Per bilateral girth measures taken and listed above the edema is considered significant and having an impact on the patient's strength, balance, gait, transfers, self care, and ADLs        15 min Therapeutic Exercise:  [] See flow sheet :  Bike for ROM  Incline/HS stretch  clamshells   Rationale: increase ROM and increase strength to improve the patients ability to improve gait mechanics     33 min Therapeutic Activity:  []  See flow sheet :  BOSU step ups   Bridges with resistance  SL squat on TG     Rationale: increase ROM and increase strength  to improve the patients ability to improve functional transfers       8 min Manual Therapy:  K-tape for patellar stability 50% pull on the L knee. Tape applied in ~30 deg of knee flexion    The manual therapy interventions were performed at a separate and distinct time from the therapeutic activities interventions. Rationale: decrease pain, increase ROM, and increase tissue extensibility to improve stability to stair negotiation         With   [] TE   [] TA   [] neuro   [] other: Patient Education: [x] Review HEP    [] Progressed/Changed HEP based on:   [] positioning   [] body mechanics   [] transfers   [] heat/ice application    [] other:      Other Objective/Functional Measures:   Gait: able to ambulate with normalized gait with out a limp  Stairs: B UE with reciprocal pattern- hesitancy with decent secondary to fear of pain  Sit to stand test: 10 reps in 30 seconds   Unable to perform eccentric step downs forward and retro  Increased fatigue in L quad with SL squats on TG      Pain Level (0-10 scale) post treatment: 0/10    ASSESSMENT/Changes in Function:   Pt presents with improvements in pain levels therefore allowing tolerance without brace and axillary crutch with gait. Continued quad atrophy with increased pain with functional strengthening. Continue to progress as tolerated. Patient will continue to benefit from skilled PT services to modify and progress therapeutic interventions, address functional mobility deficits, address ROM deficits, address strength deficits, analyze and address soft tissue restrictions, and address imbalance/dizziness to attain remaining goals. []  See Plan of Care  []  See progress note/recertification  []  See Discharge Summary         Progress towards goals / Updated goals:  Short Term Goals: To be accomplished in  2  weeks:  1. Pt will be IND and compliant with HEP for self-management of symptoms. Long Term Goals: To be accomplished in  4  weeks:  1. Pt will improve L knee AROM 0-40 deg to perform deep squat for work related activities  2. Pt will improve B hip strength to 5/5 to improve gait stability. 3. Pt will be able to ambulate community distances   4. Pt will improve FOTO score to at least 64/100 asa functional indicator of improved mobility.      PLAN  []  Upgrade activities as tolerated     [x]  Continue plan of care  []  Update interventions per flow sheet       []  Discharge due to:_  [x]  Other: check goals NV      Keshawn Espinoza DPT 10/25/2022  1113  AM    Future Appointments   Date Time Provider Ayaz Kelly   10/28/2022  2:45 PM Jose M Gtz PT [facility]   11/1/2022  9:15 AM Carlie Melo DPT [facility]   11/4/2022  9:15 AM Louann Solo PTA [facility]   11/8/2022 11:30 AM Bette Candelario PTA [facility]   11/11/2022  9:15 AM Carlie Melo DPT [facility]   11/15/2022  5:00 PM Thomas Tobin [facility]   11/18/2022  9:15 AM Fernanda Lynn DPT [facility]

## 2022-10-28 ENCOUNTER — HOSPITAL ENCOUNTER (OUTPATIENT)
Dept: PHYSICAL THERAPY | Age: 47
Discharge: HOME | End: 2022-10-28
Payer: COMMERCIAL

## 2022-10-28 PROCEDURE — 97014 ELECTRIC STIMULATION THERAPY: CPT

## 2022-10-28 PROCEDURE — 97110 THERAPEUTIC EXERCISES: CPT

## 2022-10-28 PROCEDURE — 97530 THERAPEUTIC ACTIVITIES: CPT

## 2022-10-28 PROCEDURE — 97140 MANUAL THERAPY 1/> REGIONS: CPT

## 2022-10-28 NOTE — PROGRESS NOTES
PT DAILY TREATMENT NOTE     Patient Name: May Luis  Date:10/28/2022  : 1975  [x]  Patient  Verified  Payor: BLUE CROSS / Plan: [address] [place] [address] [place] / Product Type: PPO /    In time: 2:45   Out time: 3:51   Total Treatment Time (min): 66   Visit #: 3 of     Medicare/BCBS Only   Total Timed Codes (min):  51  1:1 Treatment Time:  43       Treatment Area: Left knee pain [M25.562]    SUBJECTIVE  Pain Level (0-10 scale): 2/10  Any medication changes, allergies to medications, adverse drug reactions, diagnosis change, or new procedure performed?: [x] No    [] Yes (see summary sheet for update)  Subjective functional status/changes:   [] No changes reported  The patient reports he is having a 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 He states he is still using 1 crutch when working. OBJECTIVE    Modality rationale: decrease edema, decrease inflammation, and decrease pain to improve the patients ability to reduce pain with standing activities.     Min Type Additional Details   15 [x] Estim:  [x]Unatt       [x]IFC  []Premod                        []Other:  [x]w/ice   []w/heat  Position: reclined   Location: left knee     [] Estim: []Att    []TENS instruct  []NMES                    []Other:  []w/US   []w/ice   []w/heat  Position:  Location:    []  Traction: [] Cervical       []Lumbar                       [] Prone          []Supine                       []Intermittent   [][]Continuous Lbs:   before manual  [] after manual    []  Ultrasound: []Continuous   [] Pulsed                           []1MHz   []3MHz W/cm2:  Location:    []  Iontophoresis with dexamethasone         Location: [] Take home patch   [] In clinic   x [x]  Ice     []  heat  []  Ice massage  []  Laser   []  Anodyne Position: semi reclined  Location: L knee    []  Laser with stim  []  Other:  Position:  Location:    []  Vasopneumatic Device  Pre-treatment girth:   Post-treatment girth:   Measured at landmark location:  Pressure:       [] lo [] med [] hi   Temperature: [] lo [] med [] hi   [] Skin assessment post-treatment:  []intact []redness- no adverse reaction    []redness - adverse reaction:   Vasopnuematic compression justification:  Per bilateral girth measures taken and listed above the edema is considered significant and having an impact on the patient's strength, balance, gait, transfers, self care, and ADLs        28 min Therapeutic Exercise:  [] See flow sheet :  Bike for ROM  Incline/HS stretch  Clamshells I/II  PATIENT EDUCATION: anatomy of left knee, pathology of symptoms    Rationale: increase ROM and increase strength to improve the patients ability to improve gait mechanics     8 min Therapeutic Activity:  []  See flow sheet :  BOSU step ups   Bridges with resistance-TC  Mini squats   SL squat on TG     Rationale: increase ROM and increase strength  to improve the patients ability to improve functional transfers       15 min Manual Therapy:  STM to left hamstring     The manual therapy interventions were performed at a separate and distinct time from the therapeutic activities interventions. Rationale: decrease pain, increase ROM, and increase tissue extensibility to improve stability to stair negotiation         With   [] TE   [] TA   [] neuro   [] other: Patient Education: [x] Review HEP    [] Progressed/Changed HEP based on:   [] positioning   [] body mechanics   [] transfers   [] heat/ice application    [] other:      Other Objective/Functional Measures:   Increased discomfort with lateral BOSU       Pain Level (0-10 scale) post treatment: 0/10    ASSESSMENT/Changes in Function:   The patient presents without brace or axillary crutch, states he does use crutch at work for support. He had subjective c/o increased tightness of the left hamstring during mini squats. He reported increased discomfort at the left knee with lateral BOSU step ups.  Noted increased tightness at left hamstring during manual treatment. Progress as tolerated. Patient will continue to benefit from skilled PT services to modify and progress therapeutic interventions, address functional mobility deficits, address ROM deficits, address strength deficits, analyze and address soft tissue restrictions, and address imbalance/dizziness to attain remaining goals. [x]  See Plan of Care  []  See progress note/recertification  []  See Discharge Summary         Progress towards goals / Updated goals:  Short Term Goals: To be accomplished in  2  weeks:  1. Pt will be IND and compliant with HEP for self-management of symptoms. Current: good compliance 10/27/2022    Long Term Goals: To be accomplished in  4  weeks:  1. Pt will improve L knee AROM 0-40 deg to perform deep squat for work related activities  2. Pt will improve B hip strength to 5/5 to improve gait stability. 3. Pt will be able to ambulate community distances   4. Pt will improve FOTO score to at least 64/100 as a functional indicator of improved mobility.      PLAN  []  Upgrade activities as tolerated     [x]  Continue plan of care  []  Update interventions per flow sheet       []  Discharge due to:_  [x]  Other: check goals NV      Jessica Peterson, PT 10/28/2022  1113  AM    Future Appointments   Date Time Provider Ayaz Kelly   11/1/2022  9:15 AM Kory Paige DPT [facility]   11/4/2022  9:15 AM Cris Downing PTA [facility]   11/8/2022 11:30 AM Jimenez Licea PTA [facility]   11/11/2022  9:15 AM Kory Paige DPT [facility]   11/15/2022  5:00 PM Vikash Pickard [facility]   11/18/2022  9:15 AM Everlyn Burkitt [place], [facility]

## 2022-11-01 ENCOUNTER — HOSPITAL ENCOUNTER (OUTPATIENT)
Dept: PHYSICAL THERAPY | Age: 47
Discharge: HOME | End: 2022-11-01
Payer: COMMERCIAL

## 2022-11-01 PROCEDURE — 97530 THERAPEUTIC ACTIVITIES: CPT | Performed by: PHYSICAL THERAPIST

## 2022-11-01 PROCEDURE — 97110 THERAPEUTIC EXERCISES: CPT | Performed by: PHYSICAL THERAPIST

## 2022-11-01 PROCEDURE — 97140 MANUAL THERAPY 1/> REGIONS: CPT | Performed by: PHYSICAL THERAPIST

## 2022-11-01 NOTE — PROGRESS NOTES
PT DAILY TREATMENT NOTE     Patient Name: Adolfo Syed  Date:2022  : 1975  [x]  Patient  Verified  Payor: BLUE CROSS / Plan: [address] [place] [address] [place] / Product Type: PPO /    In time: 605   Out time: 1020  Total Treatment Time (min): 65  Visit #: 4 of     Medicare/BCBS Only   Total Timed Codes (min):  55  1:1 Treatment Time:  55       Treatment Area: Left knee pain [M25.562]    SUBJECTIVE  Pain Level (0-10 scale): 0/10  Any medication changes, allergies to medications, adverse drug reactions, diagnosis change, or new procedure performed?: [x] No    [] Yes (see summary sheet for update)  Subjective functional status/changes:   [] No changes reported  My hamstring is still tight and I just feel my knee is unstable. OBJECTIVE    Modality rationale: decrease edema, decrease inflammation, and decrease pain to improve the patients ability to reduce pain with standing activities.     Min Type Additional Details   H [x] Estim:  [x]Unatt       [x]IFC  []Premod                        []Other:  [x]w/ice   []w/heat  Position: reclined   Location: left knee     [] Estim: []Att    []TENS instruct  []NMES                    []Other:  []w/US   []w/ice   []w/heat  Position:  Location:    []  Traction: [] Cervical       []Lumbar                       [] Prone          []Supine                       []Intermittent   [][]Continuous Lbs:   before manual  [] after manual    []  Ultrasound: []Continuous   [] Pulsed                           []1MHz   []3MHz W/cm2:  Location:    []  Iontophoresis with dexamethasone         Location: [] Take home patch   [] In clinic   10 []  Ice     [x]  heat  []  Ice massage  []  Laser   []  Anodyne Position:  prone  Location: L Hamstring    []  Laser with stim  []  Other:  Position:  Location:    []  Vasopneumatic Device  Pre-treatment girth:   Post-treatment girth:   Measured at landmark location:  Pressure:       [] lo [] med [] hi   Temperature: [] lo [] med [] hi   [] Skin assessment post-treatment:  []intact []redness- no adverse reaction    []redness - adverse reaction:   Vasopnuematic compression justification:  Per bilateral girth measures taken and listed above the edema is considered significant and having an impact on the patient's strength, balance, gait, transfers, self care, and ADLs        30 min Therapeutic Exercise:  [] See flow sheet :  Bike for ROM  Incline/HS stretch  Clamshells I/II  Prone HS curls  3 way hips   Rationale: increase ROM and increase strength to improve the patients ability to improve gait mechanics     10 min Therapeutic Activity:  []  See flow sheet :  BOSU step ups TC  Bridges with resistance-  Mini squats with BTB  LAQ   Rationale: increase ROM and increase strength  to improve the patients ability to improve functional transfers       15 min Manual Therapy:  STM to left hamstring in prone with and without Graston tool number 1 and 4   The manual therapy interventions were performed at a separate and distinct time from the therapeutic activities interventions. Rationale: decrease pain, increase ROM, and increase tissue extensibility to improve stability to stair negotiation         With   [] TE   [] TA   [] neuro   [] other: Patient Education: [x] Review HEP    [] Progressed/Changed HEP based on:   [] positioning   [] body mechanics   [] transfers   [] heat/ice application    [] other:      Other Objective/Functional Measures:   Increased discomfort on the medial distal HS on the L  Pain with eccentric control of the LAQ on the L      Pain Level (0-10 scale) post treatment: 0/10    ASSESSMENT/Changes in Function:   Pt presents with increased pain in the L distal hamstring reduced after manual interventions. Added GRASTON for soft tissue mobilization which appeared to be beneficial with less pain. Assess effects of treatment NV.        Patient will continue to benefit from skilled PT services to modify and progress therapeutic interventions, address functional mobility deficits, address ROM deficits, address strength deficits, analyze and address soft tissue restrictions, and address imbalance/dizziness to attain remaining goals. [x]  See Plan of Care  []  See progress note/recertification  []  See Discharge Summary         Progress towards goals / Updated goals:  Short Term Goals: To be accomplished in  2  weeks:  1. Pt will be IND and compliant with HEP for self-management of symptoms. Current: good compliance 10/27/2022    Long Term Goals: To be accomplished in  4  weeks:  1. Pt will improve L knee AROM 0-40 deg to perform deep squat for work related activities  2. Pt will improve B hip strength to 5/5 to improve gait stability. 3. Pt will be able to ambulate community distances   4. Pt will improve FOTO score to at least 64/100 as a functional indicator of improved mobility.      PLAN  []  Upgrade activities as tolerated     [x]  Continue plan of care  []  Update interventions per flow sheet       []  Discharge due to:_  [x]  Other: check goals NV      Melanie Maynard DPT 11/1/2022  1052  AM    Future Appointments   Date Time Provider Ayaz Kelly   11/4/2022  9:15 AM Claudetta Merck, PTA [facility]   11/8/2022 11:30 AM Oliva Pepe PTA [facility]   11/11/2022  9:15 AM Kristen Vargas DPT [facility]   11/15/2022  5:00 PM Marcelina Arnett [facility]   11/18/2022  9:15 AM Ade Epps DPT [facility]

## 2022-11-04 ENCOUNTER — HOSPITAL ENCOUNTER (OUTPATIENT)
Dept: PHYSICAL THERAPY | Age: 47
Discharge: HOME | End: 2022-11-04
Payer: COMMERCIAL

## 2022-11-04 PROCEDURE — 97530 THERAPEUTIC ACTIVITIES: CPT

## 2022-11-04 PROCEDURE — 97110 THERAPEUTIC EXERCISES: CPT

## 2022-11-04 PROCEDURE — 97140 MANUAL THERAPY 1/> REGIONS: CPT

## 2022-11-04 NOTE — PROGRESS NOTES
PT DAILY TREATMENT NOTE     Patient Name: Gina Donohue  Date:2022  : 1975  [x]  Patient  Verified  Payor: BLUE CROSS / Plan: [employer] [facility] [place] / Product Type: PPO /    In time: 308   Out time: 1025  Total Treatment Time (min): 70  Visit #: 5 of     Medicare/BCBS Only   Total Timed Codes (min):  60  1:1 Treatment Time:  53       Treatment Area: Left knee pain [M25.562]    SUBJECTIVE  Pain Level (0-10 scale): 0/10  Any medication changes, allergies to medications, adverse drug reactions, diagnosis change, or new procedure performed?: [x] No    [] Yes (see summary sheet for update)  Subjective functional status/changes:   [] No changes reported  Patient continues to report feeling instability in the knee    OBJECTIVE    Modality rationale: decrease edema, decrease inflammation, and decrease pain to improve the patients ability to reduce pain with standing activities.     Min Type Additional Details   H [x] Estim:  [x]Unatt       [x]IFC  []Premod                        []Other:  [x]w/ice   []w/heat  Position: reclined   Location: left knee     [] Estim: []Att    []TENS instruct  []NMES                    []Other:  []w/US   []w/ice   []w/heat  Position:  Location:    []  Traction: [] Cervical       []Lumbar                       [] Prone          []Supine                       []Intermittent   [][]Continuous Lbs:   before manual  [] after manual    []  Ultrasound: []Continuous   [] Pulsed                           []1MHz   []3MHz W/cm2:  Location:    []  Iontophoresis with dexamethasone         Location: [] Take home patch   [] In clinic   10 []  Ice     [x]  heat  []  Ice massage  []  Laser   []  Anodyne Position:  prone  Location: L Hamstring    []  Laser with stim  []  Other:  Position:  Location:    []  Vasopneumatic Device  Pre-treatment girth:   Post-treatment girth:   Measured at landmark location:  Pressure:       [] lo [] med [] hi   Temperature: [] lo [] med [] hi   [] Skin assessment post-treatment:  []intact []redness- no adverse reaction    []redness - adverse reaction:   Vasopnuematic compression justification:  Per bilateral girth measures taken and listed above the edema is considered significant and having an impact on the patient's strength, balance, gait, transfers, self care, and ADLs        30 min Therapeutic Exercise:  [] See flow sheet :  Bike for ROM  Incline/HS stretch  Clamshells I/II BTB  Prone HS curls- TC  3 way hips BTB  HEP updated and reviewed   Rationale: increase ROM and increase strength to improve the patients ability to improve gait mechanics     20 min Therapeutic Activity:  []  See flow sheet :  BOSU step ups   Bridges with resistance- BTB  Mini squats with BTB  LAQ  TG DL/SL squats   Rationale: increase ROM and increase strength  to improve the patients ability to improve functional transfers       10 min Manual Therapy:  STM to left hamstring tendons   The manual therapy interventions were performed at a separate and distinct time from the therapeutic activities interventions. Rationale: decrease pain, increase ROM, and increase tissue extensibility to improve stability to stair negotiation         With   [] TE   [] TA   [] neuro   [] other: Patient Education: [x] Review HEP    [] Progressed/Changed HEP based on:   [] positioning   [] body mechanics   [] transfers   [] heat/ice application    [] other:      Other Objective/Functional Measures:   Increased reps with mini squats, 3 way hip   Introduced retro lunges  Performed STM to hamstring tendons   L hip strength globally 4-/5, R hip  4+/5     Pain Level (0-10 scale) post treatment: 0/10    ASSESSMENT/Changes in Function:   Patient did well with addition of retro lunges, no complaints of any increased pain. Introduced new HEP and reviewed for form. Verbal cuing for form with side stepping. Slightly improved hip and knee strength. Continue to progress as tolerated.        Patient will continue to benefit from skilled PT services to modify and progress therapeutic interventions, address functional mobility deficits, address ROM deficits, address strength deficits, analyze and address soft tissue restrictions, and address imbalance/dizziness to attain remaining goals. [x]  See Plan of Care  []  See progress note/recertification  []  See Discharge Summary         Progress towards goals / Updated goals:  Short Term Goals: To be accomplished in  2  weeks:  1. Pt will be IND and compliant with HEP for self-management of symptoms. Current: good compliance 10/27/2022    Long Term Goals: To be accomplished in  4  weeks:  1. Pt will improve L knee AROM 0-40 deg to perform deep squat for work related activities  2. Pt will improve B hip strength to 5/5 to improve gait stability. Current: L hip strength globally 4-/5, R hip globally 4+/5 (11/3/2022)  3. Pt will be able to ambulate community distances   4. Pt will improve FOTO score to at least 64/100 as a functional indicator of improved mobility.      PLAN  [x]  Upgrade activities as tolerated     [x]  Continue plan of care  []  Update interventions per flow sheet       []  Discharge due to:_  []  Other: check goals JAGDISH Art PTA 11/4/2022  1052  AM    Future Appointments   Date Time Provider Ayaz Kelly   11/8/2022 11:30 AM Kareen Licea, [facility]   11/11/2022  9:15 AM FELICIA BeebeT [facility]   11/15/2022  5:00 PM Jarvis Fraser [facility]   11/18/2022  9:15 AM [facility] Ananth Hamilton, DPT [facility]

## 2022-11-08 ENCOUNTER — HOSPITAL ENCOUNTER (OUTPATIENT)
Dept: PHYSICAL THERAPY | Age: 47
Discharge: HOME | End: 2022-11-08
Payer: COMMERCIAL

## 2022-11-08 PROCEDURE — 97035 APP MDLTY 1+ULTRASOUND EA 15: CPT

## 2022-11-08 PROCEDURE — 97140 MANUAL THERAPY 1/> REGIONS: CPT

## 2022-11-08 PROCEDURE — 97530 THERAPEUTIC ACTIVITIES: CPT

## 2022-11-08 NOTE — PROGRESS NOTES
PT DAILY TREATMENT NOTE     Patient Name: Riya Blas  Date:2022  : 1975  [x]  Patient  Verified  Payor: BLUE CROSS / Plan: [employer] [facility] [place] / Product Type: PPO /    In time: 11:32   Out time: 12:42  Total Treatment Time (min): 70  Visit #: 6 of     Medicare/BCBS Only   Total Timed Codes (min):  70 1:1 Treatment Time: 60       Treatment Area: Left knee pain [M25.562]    SUBJECTIVE  Pain Level (0-10 scale): 0/10  Any medication changes, allergies to medications, adverse drug reactions, diagnosis change, or new procedure performed?: [x] No    [] Yes (see summary sheet for update)  Subjective functional status/changes:   [] No changes reported  I went and saw the doctor this morning and  he said I doing good and can progress with my strengthen program but when he pushed on the incision site to the inside of the knee it was really painful. Don't know if that is my patella tendon or scar tissue or both     OBJECTIVE    Modality rationale: decrease edema, decrease inflammation, and decrease pain to improve the patients ability to reduce pain with standing activities.     Min Type Additional Details   H [x] Estim:  [x]Unatt       [x]IFC  []Premod                        []Other:  [x]w/ice   []w/heat  Position: reclined   Location: left knee     [] Estim: []Att    []TENS instruct  []NMES                    []Other:  []w/US   []w/ice   []w/heat  Position:  Location:    []  Traction: [] Cervical       []Lumbar                       [] Prone          []Supine                       []Intermittent   [][]Continuous Lbs:   before manual  [] after manual   8 [x]  Ultrasound: []Continuous   [x] Pulsed                           [x]1MHz   []3MHz W/cm2: 1.5 50%  Location: medial aspect of the patella tendon     []  Iontophoresis with dexamethasone         Location: [] Take home patch   [] In clinic   5 []  Ice     []  heat  [x]  Ice massage  []  Laser   []  Anodyne Position:  long sitting   Location: medial aspect of the patella tendon     []  Laser with stim  []  Other:  Position:  Location:    []  Vasopneumatic Device  Pre-treatment girth:   Post-treatment girth:   Measured at landmark location:  Pressure:       [] lo [] med [] hi   Temperature: [] lo [] med [] hi   [] Skin assessment post-treatment:  []intact []redness- no adverse reaction    []redness - adverse reaction:   Vasopnuematic compression justification:  Per bilateral girth measures taken and listed above the edema is considered significant and having an impact on the patient's strength, balance, gait, transfers, self care, and ADLs        10 min Therapeutic Exercise:  [x] See flow sheet :  Bike for ROM  Incline/HS stretch     Rationale: increase ROM and increase strength to improve the patients ability to improve gait mechanics     32 min Therapeutic Activity:  [x]  See flow sheet :  Single leg squats with TRX strap  Black t-band around waist with RDL and #25  Black t-band around waist with squat taps to bench  Single leg wall sits  Bear crawl holds  SLS on foam with yellow MB into rebounder     Rationale: increase ROM and increase strength  to improve the patients ability to improve functional transfers       15 min Manual Therapy:  Graston to the quad and cross friction massage to patella tendon - medial aspect     The manual therapy interventions were performed at a separate and distinct time from the therapeutic activities interventions.   Rationale: decrease pain, increase ROM, and increase tissue extensibility to improve stability to stair negotiation         With   [] TE   [] TA   [] neuro   [] other: Patient Education: [x] Review HEP    [] Progressed/Changed HEP based on:   [] positioning   [] body mechanics   [] transfers   [] heat/ice application    [] other:      Other Objective/Functional Measures:   Single leg squats with TRX strap  Black t-band around waist with RDL and #25  Black t-band around waist with squat taps to bench  Single leg wall sits  Bear crawl holds  SLS on foam with yellow MB into re-bounder  Added pulsed US and ice massage to the medial border of patella     LTG#1  Pt will improve L knee AROM 0-40 deg to perform deep squat for work related activities - progressing - unable to perform deep squats secondary to increase stress to patella tendon 11/8/2022    Pain Level (0-10 scale) post treatment: 0/10    ASSESSMENT/Changes in Function:     Update program to above exercises/activities. Patient required verbal and tactile cuing to avoid knee valgus or increase stress to the patella tendon. Patient presented with increase pain to the medial aspect of the patella tendon, attempted CFM,pulsed US and ice massage to assist with decreasing pain and stress. Stressed the importance of increasing quad strength to decrease stress/pressure that is causing increase pain to the patella tendon   Patient will continue to benefit from skilled PT services to modify and progress therapeutic interventions, address functional mobility deficits, address ROM deficits, address strength deficits, analyze and address soft tissue restrictions, and address imbalance/dizziness to attain remaining goals. [x]  See Plan of Care  []  See progress note/recertification  []  See Discharge Summary         Progress towards goals / Updated goals:  Short Term Goals: To be accomplished in  2  weeks:  1. Pt will be IND and compliant with HEP for self-management of symptoms. Current: good compliance 10/27/2022    Long Term Goals: To be accomplished in  4  weeks:  1. Pt will improve L knee AROM 0-40 deg to perform deep squat for work related activities - progressing - unable to perform deep squats secondary to increase stress to patella tendon 11/8/2022  2. Pt will improve B hip strength to 5/5 to improve gait stability. Current: L hip strength globally 4-/5, R hip globally 4+/5 (11/3/2022)  3. Pt will be able to ambulate community distances   4.  Pt will improve FOTO score to at least 64/100 as a functional indicator of improved mobility.      PLAN  [x]  Upgrade activities as tolerated     [x]  Continue plan of care  []  Update interventions per flow sheet       []  Discharge due to:_  []  Other:     Ab Pickering, PTA 11/8/2022  1052  AM    Future Appointments   Date Time Provider Ayaz Leticia   11/11/2022  9:15 AM FELICIA GanT [facility]   11/15/2022  5:00 PM Dave Swartzs [facility]   11/18/2022  9:15 AM FELICIA GanT [facility]   11/29/2022  3:30 PM Delvis Tam PTA [facility]   12/1/2022  9:15 AM Delvis Tam PTA [facility]   12/6/2022  9:15 AM FELICIA GanT [facility]   12/9/2022  9:15 AM FELICIA GanT [facility]   12/13/2022  9:15 AM FELICIA GanT [facility]   12/16/2022  9:15 AM Dallin Barnett DPT [facility]   12/20/2022 10:00 AM Delvis Tam PTA [facility]   12/22/2022 10:00 AM Delvis Tam PTA [facility]

## 2022-11-11 ENCOUNTER — HOSPITAL ENCOUNTER (OUTPATIENT)
Dept: PHYSICAL THERAPY | Age: 47
Discharge: HOME | End: 2022-11-11
Payer: COMMERCIAL

## 2022-11-11 PROCEDURE — 97530 THERAPEUTIC ACTIVITIES: CPT | Performed by: PHYSICAL THERAPIST

## 2022-11-11 PROCEDURE — 97110 THERAPEUTIC EXERCISES: CPT | Performed by: PHYSICAL THERAPIST

## 2022-11-11 PROCEDURE — 97014 ELECTRIC STIMULATION THERAPY: CPT | Performed by: PHYSICAL THERAPIST

## 2022-11-11 NOTE — PROGRESS NOTES
PT DAILY TREATMENT NOTE     Patient Name: Duane Gallo  Date:2022  : 1975  [x]  Patient  Verified  Payor: BLUE CROSS / Plan: [employer] [facility] [place] / Product Type: PPO /    In time: 431   Out time: 1018  Total Treatment Time (min): 63  Visit #:7 of     Medicare/BCBS Only   Total Timed Codes (min):  48  1:1 Treatment Time: 40       Treatment Area: Left knee pain [M25.562]    SUBJECTIVE  Pain Level (0-10 scale): 0/10  Any medication changes, allergies to medications, adverse drug reactions, diagnosis change, or new procedure performed?: [x] No    [] Yes (see summary sheet for update)  Subjective functional status/changes:   [] No changes reported  Pt reports that his knee was angry after last session. OBJECTIVE    Modality rationale: decrease edema, decrease inflammation, and decrease pain to improve the patients ability to reduce pain with standing activities.     Min Type Additional Details   15 [x] Estim:  [x]Unatt       [x]IFC  []Premod                        []Other:  [x]w/ice   []w/heat  Position: reclined   Location: left knee     [] Estim: []Att    []TENS instruct  []NMES                    []Other:  []w/US   []w/ice   []w/heat  Position:  Location:    []  Traction: [] Cervical       []Lumbar                       [] Prone          []Supine                       []Intermittent   [][]Continuous Lbs:   before manual  [] after manual   H [x]  Ultrasound: []Continuous   [x] Pulsed                           [x]1MHz   []3MHz W/cm2: 1.5 50%  Location: medial aspect of the patella tendon     []  Iontophoresis with dexamethasone         Location: [] Take home patch   [] In clinic   H []  Ice     []  heat  [x]  Ice massage  []  Laser   []  Anodyne Position:  long sitting   Location: medial aspect of the patella tendon     []  Laser with stim  []  Other:  Position:  Location:    []  Vasopneumatic Device  Pre-treatment girth:   Post-treatment girth:   Measured at landmark location: Pressure:       [] lo [] med [] hi   Temperature: [] lo [] med [] hi   [] Skin assessment post-treatment:  []intact []redness- no adverse reaction    []redness - adverse reaction:   Vasopnuematic compression justification:  Per bilateral girth measures taken and listed above the edema is considered significant and having an impact on the patient's strength, balance, gait, transfers, self care, and ADLs    10 min Therapeutic Exercise:  [x] See flow sheet :  Bike for ROM  Incline/HS stretch     Rationale: increase ROM and increase strength to improve the patients ability to improve gait mechanics     38 min Therapeutic Activity:  [x]  See flow sheet :  Single leg squats with TRX strap- held  Black t-band around waist with RDL and #25  Black t-band around waist with squat   Single leg wall sits- held  Bear crawl holds-held  SLS on foam with yellow MB into rebounder  Side plank with clamshell   Rationale: increase ROM and increase strength  to improve the patients ability to improve functional transfers       H min Manual Therapy:  Graston to the quad and cross friction massage to patella tendon - medial aspect     The manual therapy interventions were performed at a separate and distinct time from the therapeutic activities interventions. Rationale: decrease pain, increase ROM, and increase tissue extensibility to improve stability to stair negotiation         With   [] TE   [] TA   [] neuro   [] other: Patient Education: [x] Review HEP    [] Progressed/Changed HEP based on:   [] positioning   [] body mechanics   [] transfers   [] heat/ice application    [] other:      Other Objective/Functional Measures:   Increased pain in the L knee with SL squats therefore held this session      Pain Level (0-10 scale) post treatment: 0/10    ASSESSMENT/Changes in Function:   Held SL advanced therex performed last visit secondary to increased pain and swelling in the L knee. Focused treatment on lateral hip strengthening.  Added IFC and Ice to his knee to reduce pain and irritation. Assess effects of treatment NV. Pt reports increased fullness in the posterior knee on the L therefore,    Patient will continue to benefit from skilled PT services to modify and progress therapeutic interventions, address functional mobility deficits, address ROM deficits, address strength deficits, analyze and address soft tissue restrictions, and address imbalance/dizziness to attain remaining goals. [x]  See Plan of Care  []  See progress note/recertification  []  See Discharge Summary         Progress towards goals / Updated goals:  Short Term Goals: To be accomplished in  2  weeks:  1. Pt will be IND and compliant with HEP for self-management of symptoms. Current: good compliance 10/27/2022    Long Term Goals: To be accomplished in  4  weeks:  1. Pt will improve L knee AROM 0-40 deg to perform deep squat for work related activities - progressing - unable to perform deep squats secondary to increase stress to patella tendon 11/8/2022  2. Pt will improve B hip strength to 5/5 to improve gait stability. Current: L hip strength globally 4-/5, R hip globally 4+/5 (11/3/2022)  3. Pt will be able to ambulate community distances   4. Pt will improve FOTO score to at least 64/100 as a functional indicator of improved mobility.      PLAN  [x]  Upgrade activities as tolerated     [x]  Continue plan of care  []  Update interventions per flow sheet       []  Discharge due to:_  [x]  Other: check goals JAGDISH Liriano DPT 11/11/2022  1032  AM    Future Appointments   Date Time Provider Ayaz Kelly   11/15/2022  5:00 PM Gretel Reynolds [facility]   11/18/2022  9:15 AM Сергей Ruiz DPT [facility]   11/29/2022  3:30 PM Gretel Reynolds [facility]   12/1/2022  9:15 AM Jeremy Perdue PTA [facility]   12/6/2022  9:15 AM Сергей Ruiz DPT [facility]   12/9/2022  9:15 AM Сергей Ruiz DPT [facility]   12/13/2022  9:15 AM Lashanda Bhardwaj, TANIA [facility]   12/16/2022  9:15 AM Lashanda Bhardwaj DPT [facility]   12/20/2022 10:00 AM Marcus Coffey PTA [facility]   12/22/2022 10:00 AM Marcus Coffye PTA [facility]

## 2022-11-15 ENCOUNTER — HOSPITAL ENCOUNTER (OUTPATIENT)
Dept: PHYSICAL THERAPY | Age: 47
Discharge: HOME | End: 2022-11-15
Payer: COMMERCIAL

## 2022-11-15 PROCEDURE — 97530 THERAPEUTIC ACTIVITIES: CPT

## 2022-11-15 PROCEDURE — 97110 THERAPEUTIC EXERCISES: CPT

## 2022-11-15 PROCEDURE — 97140 MANUAL THERAPY 1/> REGIONS: CPT

## 2022-11-15 NOTE — PROGRESS NOTES
PT DAILY TREATMENT NOTE     Patient Name: Chantal Lacey  Date:11/15/2022  : 1975  [x]  Patient  Verified  Payor: BLUE CROSS / Plan: [employer] [place] [address] [place] / Product Type: PPO /    In time: 5:06  Out time: 6:30  Total Treatment Time (min): 84  Visit #:8 of     Medicare/BCBS Only   Total Timed Codes (min):  84  1:1 Treatment Time: 74       Treatment Area: Left knee pain [M25.562]    SUBJECTIVE  Pain Level (0-10 scale): 0/10  Any medication changes, allergies to medications, adverse drug reactions, diagnosis change, or new procedure performed?: [x] No    [] Yes (see summary sheet for update)  Subjective functional status/changes:   [] No changes reported  I am better today but I noticed that when I try to cross my leg to put my shoe on, I can't do that motion because I feel a deep pain to the inside of my knee and down the front and inside thigh     OBJECTIVE    Modality rationale: decrease edema, decrease inflammation, and decrease pain to improve the patients ability to reduce pain with standing activities.     Min Type Additional Details   held [x] Estim:  [x]Unatt       [x]IFC  []Premod                        []Other:  [x]w/ice   []w/heat  Position: reclined   Location: left knee     [] Estim: []Att    []TENS instruct  []NMES                    []Other:  []w/US   []w/ice   []w/heat  Position:  Location:    []  Traction: [] Cervical       []Lumbar                       [] Prone          []Supine                       []Intermittent   [][]Continuous Lbs:   before manual  [] after manual   8 [x]  Ultrasound: []Continuous   [x] Pulsed                           [x]1MHz   []3MHz W/cm2: 1.5 50%  Location: medial aspect of the patella tendon     []  Iontophoresis with dexamethasone         Location: [] Take home patch   [] In clinic   5 []  Ice     []  heat  [x]  Ice massage  []  Laser   []  Anodyne Position:  long sitting   Location: medial aspect of the patella tendon     []  Laser with stim  [] Other:  Position:  Location:    []  Vasopneumatic Device  Pre-treatment girth:   Post-treatment girth:   Measured at landmark location:  Pressure:       [] lo [] med [] hi   Temperature: [] lo [] med [] hi   [] Skin assessment post-treatment:  []intact []redness- no adverse reaction    []redness - adverse reaction:   Vasopnuematic compression justification:  Per bilateral girth measures taken and listed above the edema is considered significant and having an impact on the patient's strength, balance, gait, transfers, self care, and ADLs    21 min Therapeutic Exercise:  [x] See flow sheet :  Bike for ROM  Incline/HS stretch  Stationary lunges  Single leg bridge with #25 over pelvis with (R) LE and 0# with (L) LE    Hip adductor stretch on step and on floor      Rationale: increase ROM and increase strength to improve the patients ability to improve gait mechanics     35 min Therapeutic Activity:  [x]  See flow sheet :    Black t-band around waist with RDL and #25  Black t-band around waist with squat   Wall slides and then picking up (R) leg for 3 sec hold    Bear crawl holds- 20 sec  SLS on foam with yellow MB into rebounder     Rationale: increase ROM and increase strength  to improve the patients ability to improve functional transfers       15 min Manual Therapy:  cross friction massage to patella tendon and quad tendon -  passive stretching to hip IR and ER and adductors    The manual therapy interventions were performed at a separate and distinct time from the therapeutic activities interventions. Rationale: decrease pain, increase ROM, and increase tissue extensibility to improve stability to stair negotiation         With   [] TE   [] TA   [] neuro   [] other: Patient Education: [x] Review HEP    [] Progressed/Changed HEP based on:   [] positioning   [] body mechanics   [] transfers   [] heat/ice application    [] other:      Other Objective/Functional Measures:    Added   Single leg bridge with #25 over pelvis with (R) LE and 0# with (L) LE    Hip adductor stretch on step and on floor   Changed:   Wall slides and then picking up (R) leg for 3 sec hold    Bear crawl holds- 20 sec      Pain Level (0-10 scale) post treatment: 0/10    ASSESSMENT/Changes in Function:   Patient was able to resume all exercises and activities as well as additional exercise/activities stated above. Secondary to patient reporting pain with donning shoes if patient has to perform hip ER/abduction and knee flexion, added hip adductor stretch and attempting hip ER stretch in sitting however caused increase pain to the medial aspect of the knee due to max knee flexion with some tibial rotation causing increase pressure to the joint of the knee. Performed passive hip IR/ER in supine without causing any knee rotation, presented with tightness with IR>ER to the hip   Patient will continue to benefit from skilled PT services to modify and progress therapeutic interventions, address functional mobility deficits, address ROM deficits, address strength deficits, analyze and address soft tissue restrictions, and address imbalance/dizziness to attain remaining goals. [x]  See Plan of Care  []  See progress note/recertification  []  See Discharge Summary         Progress towards goals / Updated goals:  Short Term Goals: To be accomplished in  2  weeks:  1. Pt will be IND and compliant with HEP for self-management of symptoms. Current: good compliance 10/27/2022    Long Term Goals: To be accomplished in  4  weeks:  1. Pt will improve L knee AROM 0-40 deg to perform deep squat for work related activities - progressing - unable to perform deep squats secondary to increase stress to patella tendon 11/8/2022  2. Pt will improve B hip strength to 5/5 to improve gait stability. Current: L hip strength globally 4-/5, R hip globally 4+/5 (11/3/2022)  3. Pt will be able to ambulate community distances   4.  Pt will improve FOTO score to at least 64/100 as a functional indicator of improved mobility.      PLAN  [x]  Upgrade activities as tolerated     [x]  Continue plan of care  []  Update interventions per flow sheet       []  Discharge due to:_  [x]  Other: check goals JAGDISH Orellana, PTA 11/15/2022  1032  AM    Future Appointments   Date Time Provider Ayaz Kelly   11/18/2022  9:15 AM FELICIA GonzalezT [facility]   11/29/2022  3:30 PM Marylin Locks [facility]   12/1/2022  9:15 AM Marcy Mireles, PTA [facility]   12/6/2022  9:15 AM FELICIA GonzalezT [facility]   12/9/2022  9:15 AM FELICIA GonzalezT [facility]   12/13/2022  9:15 AM FELICIA GonzalezT [facility]   12/16/2022  9:15 AM FELICIA GonzalezT [facility]   12/20/2022 10:00 AM Marcy Mireles, SANTOS [facility]   12/22/2022 10:00 AM Marcy Mireles, PTA [facility]

## 2022-11-18 ENCOUNTER — HOSPITAL ENCOUNTER (OUTPATIENT)
Dept: PHYSICAL THERAPY | Age: 47
Discharge: HOME | End: 2022-11-18
Payer: COMMERCIAL

## 2022-11-18 PROCEDURE — 97110 THERAPEUTIC EXERCISES: CPT | Performed by: PHYSICAL THERAPIST

## 2022-11-18 PROCEDURE — 97530 THERAPEUTIC ACTIVITIES: CPT | Performed by: PHYSICAL THERAPIST

## 2022-11-18 NOTE — PROGRESS NOTES
PT DAILY TREATMENT NOTE     Patient Name: Mouna Conway  Date:2022  : 1975  [x]  Patient  Verified  Payor: BLUE CROSS / Plan: [address] [place] [address] [place] / Product Type: PPO /    In time: 716  Out time: 971  Total Treatment Time (min): 39  Visit #:9 of -    Medicare/BCBS Only   Total Timed Codes (min): 39 1:1 Treatment Time: 39       Treatment Area: Left knee pain [M25.562]    SUBJECTIVE  Pain Level (0-10 scale): 0/10  Any medication changes, allergies to medications, adverse drug reactions, diagnosis change, or new procedure performed?: [x] No    [] Yes (see summary sheet for update)  Subjective functional status/changes:   [] No changes reported  Pt reports 80-85% improvement of symptoms since [facility]. He reports improvements to ascend/descend stairs, walk normal, ability to perform most daily tasks. Functional limitations include walking with weight- 50# give or take, and running. OBJECTIVE    Modality rationale: decrease edema, decrease inflammation, and decrease pain to improve the patients ability to reduce pain with standing activities.     Min Type Additional Details   held [x] Estim:  [x]Unatt       [x]IFC  []Premod                        []Other:  [x]w/ice   []w/heat  Position: reclined   Location: left knee     [] Estim: []Att    []TENS instruct  []NMES                    []Other:  []w/US   []w/ice   []w/heat  Position:  Location:    []  Traction: [] Cervical       []Lumbar                       [] Prone          []Supine                       []Intermittent   [][]Continuous Lbs:   before manual  [] after manual   PD [x]  Ultrasound: []Continuous   [x] Pulsed                           [x]1MHz   []3MHz W/cm2: 1.5 50%  Location: medial aspect of the patella tendon     []  Iontophoresis with dexamethasone         Location: [] Take home patch   [] In clinic   PD []  Ice     []  heat  [x]  Ice massage  []  Laser   []  Anodyne Position:  long sitting   Location: medial aspect of the patella tendon     []  Laser with stim  []  Other:  Position:  Location:    []  Vasopneumatic Device  Pre-treatment girth:   Post-treatment girth:   Measured at landmark location:  Pressure:       [] lo [] med [] hi   Temperature: [] lo [] med [] hi   [] Skin assessment post-treatment:  []intact []redness- no adverse reaction    []redness - adverse reaction:   Vasopnuematic compression justification:  Per bilateral girth measures taken and listed above the edema is considered significant and having an impact on the patient's strength, balance, gait, transfers, self care, and ADLs    8 min Therapeutic Exercise:  [x] See flow sheet :  Bike for ROM  Incline/HS stretch       Rationale: increase ROM and increase strength to improve the patients ability to improve gait mechanics     30 min Therapeutic Activity:  [x]  See flow sheet :  PT reassessment  FOTO for direct supervision  Jumping assessment  Jogging assessment         Rationale: increase ROM and increase strength  to improve the patients ability to improve functional transfers       H min Manual Therapy:  cross friction massage to patella tendon and quad tendon -  passive stretching to hip IR and ER and adductors    The manual therapy interventions were performed at a separate and distinct time from the therapeutic activities interventions. Rationale: decrease pain, increase ROM, and increase tissue extensibility to improve stability to stair negotiation         With   [] TE   [] TA   [] neuro   [] other: Patient Education: [x] Review HEP    [] Progressed/Changed HEP based on:   [] positioning   [] body mechanics   [] transfers   [] heat/ice application    [] other:      Other Objective/Functional Measures:    FOTO: improved to 65/100 from 44/100 at last assessment  AROM of the L knee: 0-135 deg  B gross hip strength: 5/5  Double leg jumps: 30 sec- anterior knee pain at the end with knee pop  Lateral line jumps: 30 seconds (increased pain jumping to the L, does not like to push off to jump towards)  He is able to hold a front plank for 1 min with good form  Wall sit: able to hold for 40 seconds before increased pain in the L knee especially the release  Attempted jog 50'x2 with pain and abnormal pushoff on the L    Pain Level (0-10 scale) post treatment: 2/10    ASSESSMENT/Changes in Function:   Upon reassessment, pt presents with increased L knee AROM and gross hip strength. He continues to have pain with plyometric activities noted with increased pain with jumping and running. His readiness to run test indicating continued functional weakness in the core and quads. He would benefit from continued therapy to further address functional strengthening to return to work related activities without pain in the L knee. Patient will continue to benefit from skilled PT services to modify and progress therapeutic interventions, address functional mobility deficits, address ROM deficits, address strength deficits, analyze and address soft tissue restrictions, and address imbalance/dizziness to attain remaining goals. [x]  See Plan of Care  []  See progress note/recertification  []  See Discharge Summary         Progress towards goals / Updated goals:  Short Term Goals: To be accomplished in  2  weeks:  1. Pt will be IND and compliant with HEP for self-management of symptoms. Current: good compliance 10/27/2022    Long Term Goals: To be accomplished in  4  weeks:  1. Pt will improve L knee AROM 0-140 deg to perform deep squat for work related activities met 11/18/22  2. Pt will improve B hip strength to 5/5 to improve gait stability. Current: 5/5 grossly goal met 11/18/22  3. Pt will be able to ambulate community distances. Pt reports goal met- not limited 11/18/22   4.  Pt will improve FOTO score to at least 64/100 as a functional indicator of improved mobility. goal met 11/18/22    PLAN  [x]  Upgrade activities as tolerated     [x]  Continue plan of care  []  Update interventions per flow sheet       []  Discharge due to:_  [x]  Other: Continue PT 2x/week for 8-12 visits to progress towards updated goals     Yaz Liriano DPT 11/18/2022  1034   AM    Future Appointments   Date Time Provider Ayaz Kelly   11/29/2022  3:30 PM Corazon Nunez [facility]   12/1/2022  9:15 AM Jeremy Perdue PTA [facility]   12/6/2022  9:15 AM Сергей Bones, DPT [facility]   12/9/2022  9:15 AM Сергей Bones, DPT [facility]   12/13/2022  9:15 AM Сергей Bones, DPT [facility]   12/16/2022  9:15 AM Сергей Bones, DPT [facility]   12/20/2022 10:00 AM Jeremy Perdue PTA [facility]   12/22/2022 10:00 AM Jeremy Perdue PTA [facility]

## 2022-11-18 NOTE — PROGRESS NOTES
[address] [facility] AT [facility]  [address] [facility] [address] [place] [address],[address], Terrance jenkins, Arnie Moore  Phone [phone]  Fax [phone]  PROGRESS NOTE  Patient Name: Jennifer Maldonado : 1975   Treatment/Medical Diagnosis: Left knee pain [M25.562]   Referral Source: Fina Paris MD     Date of Initial Visit: 10/18/22 Attended Visits: 9 Missed Visits: 0     SUMMARY OF TREATMENT  Pt seen for 9 PT visits sp L menisectomy on 10/6/22. CURRENT STATUS  Pt reports 80-85% improvement of symptoms since [facility]. He reports improvements to ascend/descend stairs, walk normal, ability to perform most daily tasks. Functional limitations include walking with weight- 50# give or take, and running. Upon reassessment, pt presents with increased L knee AROM and gross hip strength. He continues to have pain with plyometric activities noted with increased pain with jumping and running. His readiness to run test indicating continued functional weakness in the core and quads. He would benefit from continued therapy to further address functional strengthening to return to work related activities without pain in the L knee. Other Objective/Functional Measures: FOTO: improved to 65/100 from 44/100 at last assessment  AROM of the L knee: 0-135 deg  B gross hip strength: 5/5  Double leg jumps: 30 sec- anterior knee pain at the end with knee pop  Lateral line jumps: 30 seconds (increased pain jumping to the L, does not like to push off to jump towards)  He is able to hold a front plank for 1 min with good form  Wall sit: able to hold for 40 seconds before increased pain in the L knee especially the release  Attempted jog 50'x2 with pain and abnormal pushoff on the L    Other Objective/Functional Measures: taken at [facility] on 10/18/22  Upon evaluation, pt ambulates with R axillary crutch and brace on the L LE demonstrating reciprocal pattern. AROM of the L knee is as follows: 0-120 deg.  Incisions are healing, no signs of infection noted. Noted distal patellar swelling with tenderness over the L pes anserine. B hip flexion strength is grossly a 4+/5, abduction: 4/5, extension 4+/5, L Hamstring strength: 4/5 with pain. Sit to stand/stair test not performed secondary to TC. Will assess NV. Goal/Measure of Progress Goal Met? Short Term Goals: To be accomplished in  2  weeks:  1. Pt will be IND and compliant with HEP for self-management of symptoms. Current: good compliance 10/27/2022     Long Term Goals: To be accomplished in  4  weeks:  1. Pt will improve L knee AROM 0-140 deg to perform deep squat for work related activities met 11/18/22  2. Pt will improve B hip strength to 5/5 to improve gait stability. Current: 5/5 grossly goal met 11/18/22  3. Pt will be able to ambulate community distances. Pt reports goal met- not limited 11/18/22   4. Pt will improve FOTO score to at least 64/100 as a functional indicator of improved mobility. goal met 11/18/22    New Goals to be achieved in __4__  weeks:  1. Pt will be able to perform a SL squat without pain and good form to return to PLOF. 2.  Pt will be able to squat with 40# without pain in the L knee to return to work related activities. 3.  Pt will be able to hold a wall sit for 60 seconds as an indicator of improved functional quad strength/endurance. 4.  Pt will be able to perform front/lateral line jumps for 30 seconds without pain to return to running. RECOMMENDATIONS   Continue PT 2x/week for 8-12 visits to progress towards updated goals   If you have any questions/comments please contact us directly at [phone]. Thank you for allowing us to assist in the care of your patient. Therapist Signature: Skip Rodrigues DPT Date: 11/18/2022     Time: 10:34 AM   NOTE TO PHYSICIAN:  PLEASE COMPLETE THE ORDERS BELOW AND FAX TO   [facility]: [phone].   If you are unable to process this request in 24 hours please contact our office: [phone].    ___ I have read the above report and request that my patient continue as recommended.   ___ I have read the above report and request that my patient continue therapy with the following changes/special instructions:_________________________________________________________   ___ I have read the above report and request that my patient be discharged from therapy.      Physician Signature:        Date:       Time:    Monty Patricio MD

## 2022-11-29 ENCOUNTER — HOSPITAL ENCOUNTER (OUTPATIENT)
Dept: PHYSICAL THERAPY | Age: 47
Discharge: HOME | End: 2022-11-29
Payer: COMMERCIAL

## 2022-11-29 PROCEDURE — 97140 MANUAL THERAPY 1/> REGIONS: CPT

## 2022-11-29 PROCEDURE — 97530 THERAPEUTIC ACTIVITIES: CPT

## 2022-11-29 NOTE — PROGRESS NOTES
PT DAILY TREATMENT NOTE     Patient Name: Santhosh Nguyễn  Date:2022  : 1975  [x]  Patient  Verified  Payor: BLUE CROSS / Plan: [employer] [place] [address] [place] / Product Type: PPO /    In time:8:33  Out time:9:43  Total Treatment Time (min): 70  Visit #:9 of     Medicare/BCBS Only   Total Timed Codes (min): 60 1:1 Treatment Time: 50       Treatment Area: Left knee pain [M25.562]    SUBJECTIVE  Pain Level (0-10 scale): 0/10  Any medication changes, allergies to medications, adverse drug reactions, diagnosis change, or new procedure performed?: [x] No    [] Yes (see summary sheet for update)  Subjective functional status/changes:   [] No changes reported  I am feeling it more to the back of the hamstrings and calf today     OBJECTIVE    Modality rationale: decrease edema, decrease inflammation, and decrease pain to improve the patients ability to reduce pain with standing activities.     Min Type Additional Details   held [x] Estim:  [x]Unatt       [x]IFC  []Premod                        []Other:  [x]w/ice   []w/heat  Position: reclined   Location: left knee     [] Estim: []Att    []TENS instruct  []NMES                    []Other:  []w/US   []w/ice   []w/heat  Position:  Location:    []  Traction: [] Cervical       []Lumbar                       [] Prone          []Supine                       []Intermittent   [][]Continuous Lbs:   before manual  [] after manual   PD [x]  Ultrasound: []Continuous   [x] Pulsed                           [x]1MHz   []3MHz W/cm2: 1.5 50%  Location: medial aspect of the patella tendon     []  Iontophoresis with dexamethasone         Location: [] Take home patch   [] In clinic   10 []  Ice     [x]  heat  []  Ice massage  []  Laser   []  Anodyne Position:  long sitting   Location: hamstring/quads - lateral aspect     []  Laser with stim  []  Other:  Position:  Location:    []  Vasopneumatic Device  Pre-treatment girth:   Post-treatment girth:   Measured at landmark location:  Pressure:       [] lo [] med [] hi   Temperature: [] lo [] med [] hi   [] Skin assessment post-treatment:  []intact []redness- no adverse reaction    []redness - adverse reaction:   Vasopnuematic compression justification:  Per bilateral girth measures taken and listed above the edema is considered significant and having an impact on the patient's strength, balance, gait, transfers, self care, and ADLs    10 min Therapeutic Exercise:  [x] See flow sheet :  Bike for ROM  Incline/HS stretch  Stationary lunges - held  Single leg bridge with #25 over pelvis with (R) LE and 0# with (L) LE  -held  Hip adductor stretch on step and on floor - held       Rationale: increase ROM and increase strength to improve the patients ability to improve gait mechanics     30 min Therapeutic Activity:  [x]  See flow sheet :  Black t-band around waist with RDL and #25  Black t-band around waist with squat   Wall slides and then picking up (R) leg for 3 sec hold    Bear crawl holds- 20 sec  SLS on foam with yellow MB into rebounder  Added step back from 8\" step               Rationale: increase ROM and increase strength  to improve the patients ability to improve functional transfers       15+5 cupping min Manual Therapy:    Cupping to the lateral quad, hamstrings, gastro/soleus and DTM and TPR to lateral quad/hamstrings, gastro/soleus and CFM to the patella tendon    The manual therapy interventions were performed at a separate and distinct time from the therapeutic activities interventions.   Rationale: decrease pain, increase ROM, and increase tissue extensibility to improve stability to stair negotiation         With   [] TE   [] TA   [] neuro   [] other: Patient Education: [x] Review HEP    [] Progressed/Changed HEP based on:   [] positioning   [] body mechanics   [] transfers   [] heat/ice application    [] other:      Other Objective/Functional Measures:   Performed  cupping to the lateral quad, hamstrings, gastro/soleus after DTM and TPR to same area    Pain Level (0-10 scale) post treatment: 0/10    ASSESSMENT/Changes in Function:    Patient presented with increase pain to the distal lateral hamstring/gastro area after approx 5- 7 reps of each activity focusing on the increasing quad strength/muscle tone/bulk. If patient continued with additional reps patient would experience patella tendon pain   Patient will continue to benefit from skilled PT services to modify and progress therapeutic interventions, address functional mobility deficits, address ROM deficits, address strength deficits, analyze and address soft tissue restrictions, and address imbalance/dizziness to attain remaining goals. [x]  See Plan of Care  []  See progress note/recertification  []  See Discharge Summary         Progress towards goals / Updated goals:  New Goals to be achieved in __4__  weeks:  1. Pt will be able to perform a SL squat without pain and good form to return to PLOF. 2.  Pt will be able to squat with 40# without pain in the L knee to return to work related activities. 3.  Pt will be able to hold a wall sit for 60 seconds as an indicator of improved functional quad strength/endurance. 4.  Pt will be able to perform front/lateral line jumps for 30 seconds without pain to return to running.        PLAN  [x]  Upgrade activities as tolerated     [x]  Continue plan of care  []  Update interventions per flow sheet       []  Discharge due to:_  []  Other:   Kev Martinez PTA 11/29/2022  1034   AM    Future Appointments   Date Time Provider Ayaz Kelly   12/1/2022 11:30 AM China Vaz PTA [facility]   12/6/2022  9:15 AM Krysten Acosta DPT [facility]   12/9/2022  9:15 AM Krysten Acosta DPT [facility]   12/13/2022  9:15 AM Krysten Acosta DPT [facility]   12/16/2022  9:15 AM Krysten Acosta DPT [facility]   12/20/2022 10:00 AM China Vaz PTA [facility]   12/22/2022 10:00 AM Katey Lopez DPT [facility]

## 2022-12-01 ENCOUNTER — HOSPITAL ENCOUNTER (OUTPATIENT)
Dept: PHYSICAL THERAPY | Age: 47
Discharge: HOME | End: 2022-12-01
Payer: COMMERCIAL

## 2022-12-01 PROCEDURE — 97140 MANUAL THERAPY 1/> REGIONS: CPT

## 2022-12-01 PROCEDURE — 97530 THERAPEUTIC ACTIVITIES: CPT

## 2022-12-01 NOTE — PROGRESS NOTES
PT DAILY TREATMENT NOTE     Patient Name: Bala Loyd  Date:2022  : 1975  [x]  Patient  Verified  Payor: BLUE CROSS / Plan: [address] [place] [address] [place] / Product Type: PPO /    In time:11:30  Out time:12:38  Total Treatment Time (min): 68  Visit #:2 of     Medicare/BCBS Only   Total Timed Codes (min):68 1:1 Treatment Time: 30       Treatment Area: Left knee pain [M25.562]    SUBJECTIVE  Pain Level (0-10 scale): 0/10  Any medication changes, allergies to medications, adverse drug reactions, diagnosis change, or new procedure performed?: [x] No    [] Yes (see summary sheet for update)  Subjective functional status/changes:   [] No changes reported  I will say that the back of my knee (hamstrings) and outside calf is not as angry today. It is mainly to the patella tendon area    OBJECTIVE    Modality rationale: decrease edema, decrease inflammation, and decrease pain to improve the patients ability to reduce pain with standing activities.     Min Type Additional Details   held [x] Estim:  [x]Unatt       [x]IFC  []Premod                        []Other:  [x]w/ice   []w/heat  Position: reclined   Location: left knee     [] Estim: []Att    []TENS instruct  []NMES                    []Other:  []w/US   []w/ice   []w/heat  Position:  Location:    []  Traction: [] Cervical       []Lumbar                       [] Prone          []Supine                       []Intermittent   [][]Continuous Lbs:   before manual  [] after manual   8 [x]  Ultrasound: []Continuous   [x] Pulsed                           [x]1MHz   []3MHz W/cm2: 1.5 50%  Location: medial aspect of the patella tendon     []  Iontophoresis with dexamethasone         Location: [] Take home patch   [] In clinic    []  Ice     [x]  heat  []  Ice massage  []  Laser   []  Anodyne Position:  long sitting   Location: hamstring/quads - lateral aspect     []  Laser with stim  []  Other:  Position:  Location:    []  Vasopneumatic Device  Pre-treatment girth:   Post-treatment girth:   Measured at landmark location:  Pressure:       [] lo [] med [] hi   Temperature: [] lo [] med [] hi   [] Skin assessment post-treatment:  []intact []redness- no adverse reaction    []redness - adverse reaction:   Vasopnuematic compression justification:  Per bilateral girth measures taken and listed above the edema is considered significant and having an impact on the patient's strength, balance, gait, transfers, self care, and ADLs    20 min Therapeutic Exercise:  [x] See flow sheet :  Bike for ROM  Incline/HS stretch  Stationary lunges - held  Single leg bridge with #25 over pelvis with (R) LE and 0# with (L) LE  -held  Hip adductor stretch on step and on floor - held       Rationale: increase ROM and increase strength to improve the patients ability to improve gait mechanics     28 min Therapeutic Activity:  [x]  See flow sheet :  Black t-band around waist with RDL and #25  Black t-band around waist with squat   Wall slides on door   Bear crawl holds- 20 sec  SLS on foam with yellow MB into rebounder   step back from 8\" step     Rationale: increase ROM and increase strength  to improve the patients ability to improve functional transfers       12 min Manual Therapy:     CFM to the patella tendon and quad tendon    The manual therapy interventions were performed at a separate and distinct time from the therapeutic activities interventions.   Rationale: decrease pain, increase ROM, and increase tissue extensibility to improve stability to stair negotiation         With   [] TE   [] TA   [] neuro   [] other: Patient Education: [x] Review HEP    [] Progressed/Changed HEP based on:   [] positioning   [] body mechanics   [] transfers   [] heat/ice application    [] other:      Other Objective/Functional Measures:   Changed stationary wall sits and picking up (R) LE to increase pressure to the (L) LE to wall slides on door to assist with motion  Unable to perform step backs on step today without (L) knee \"buckling\" - held   Added mirror for visual feed back and widen stance with hip abduction with lunges to have patient perform correctly   CFM to the patella and quad tendon - followed by pulsed US to both area    GOALS  Pt will be able to hold a wall sit for 60 seconds as an indicator of improved functional quad strength/endurance. Unable - patient is able to perform wall slides with no hold on door 12/1/2022    Pain Level (0-10 scale) post treatment: 0/10    ASSESSMENT/Changes in Function:    Patient will continue to benefit from skilled PT services to modify and progress therapeutic interventions, address functional mobility deficits, address ROM deficits, address strength deficits, analyze and address soft tissue restrictions, and address imbalance/dizziness to attain remaining goals. [x]  See Plan of Care  []  See progress note/recertification  []  See Discharge Summary         Progress towards goals / Updated goals:  New Goals to be achieved in __4__  weeks:  1. Pt will be able to perform a SL squat without pain and good form to return to PLOF. 2.  Pt will be able to squat with 40# without pain in the L knee to return to work related activities. 3.  Pt will be able to hold a wall sit for 60 seconds as an indicator of improved functional quad strength/endurance. Unable - patient is able to perform wall slides with no hold on door 12/1/2022   4. Pt will be able to perform front/lateral line jumps for 30 seconds without pain to return to running.        PLAN  [x]  Upgrade activities as tolerated     [x]  Continue plan of care  []  Update interventions per flow sheet       []  Discharge due to:_  []  Other:   Argelia Leahy, SANTOS 12/1/2022  1034   AM    Future Appointments   Date Time Provider Ayaz Kelly   12/6/2022  9:15 AM Carlie Melo DPT [facility]   12/9/2022  9:15 AM Carlie Melo DPT [facility]   12/13/2022  9:15 AM Carlie Melo DPT [facility] 12/16/2022  9:15 AM [facility]   12/20/2022 10:00 AM Claudia Bruno DPT [facility]   12/22/2022 10:00 AM Claudia Bruno DPT [facility]

## 2022-12-06 ENCOUNTER — HOSPITAL ENCOUNTER (OUTPATIENT)
Dept: PHYSICAL THERAPY | Age: 47
Discharge: HOME | End: 2022-12-06
Payer: COMMERCIAL

## 2022-12-06 PROCEDURE — 97530 THERAPEUTIC ACTIVITIES: CPT | Performed by: PHYSICAL THERAPIST

## 2022-12-06 PROCEDURE — 97110 THERAPEUTIC EXERCISES: CPT | Performed by: PHYSICAL THERAPIST

## 2022-12-06 PROCEDURE — 97014 ELECTRIC STIMULATION THERAPY: CPT | Performed by: PHYSICAL THERAPIST

## 2022-12-06 NOTE — PROGRESS NOTES
PT DAILY TREATMENT NOTE     Patient Name: May Luis  Date:2022  : 1975  [x]  Patient  Verified  Payor: BLUE CROSS / Plan: [facility] [facility] [place] / Product Type: PPO /    In time:916   Out time:1030  Total Treatment Time (min): 74  Visit #:2 of     Medicare/BCBS Only   Total Timed Codes (min):59 1:1 Treatment Time: 59       Treatment Area: Left knee pain [M25.562]    SUBJECTIVE  Pain Level (0-10 scale): 0/10  Any medication changes, allergies to medications, adverse drug reactions, diagnosis change, or new procedure performed?: [x] No    [] Yes (see summary sheet for update)  Subjective functional status/changes:   [] No changes reported  Pt reports he still has pinching in his the front of his knee. OBJECTIVE    Modality rationale: decrease edema, decrease inflammation, and decrease pain to improve the patients ability to reduce pain with standing activities.     Min Type Additional Details   10 [x] Estim:  [x]Unatt       []IFC  []Premod                        [x]Other: HV [x]w/ice   []w/heat  Position: reclined   Location: lL VMO with randee with ER    [] Estim: []Att    []TENS instruct  []NMES                    []Other:  []w/US   []w/ice   []w/heat  Position:  Location:    []  Traction: [] Cervical       []Lumbar                       [] Prone          []Supine                       []Intermittent   [][]Continuous Lbs:   before manual  [] after manual   H [x]  Ultrasound: []Continuous   [x] Pulsed                           [x]1MHz   []3MHz W/cm2: 1.5 50%  Location: medial aspect of the patella tendon     []  Iontophoresis with dexamethasone         Location: [] Take home patch   [] In clinic   15 [x]  Ice     []  heat  []  Ice massage  []  Laser   []  Anodyne Position:  long sitting   Location: hamstring/quads - lateral aspect     []  Laser with stim  []  Other:  Position:  Location:    []  Vasopneumatic Device  Pre-treatment girth:   Post-treatment girth:   Measured at landmark location:  Pressure:       [] lo [] med [] hi   Temperature: [] lo [] med [] hi   [] Skin assessment post-treatment:  []intact []redness- no adverse reaction    []redness - adverse reaction:   Vasopnuematic compression justification:  Per bilateral girth measures taken and listed above the edema is considered significant and having an impact on the patient's strength, balance, gait, transfers, self care, and ADLs    29 min Therapeutic Exercise:  [x] See flow sheet :  Bike for ROM  Incline/HS stretch  Stationary lunges - held  Single leg bridge with #25 over pelvis   Hip adductor stretch on step       Rationale: increase ROM and increase strength to improve the patients ability to improve gait mechanics     30 min Therapeutic Activity:  [x]  See flow sheet :  Black t-band around waist with RDL and #25  Black t-band around waist with squat   Wall slides on door   SLS on foam with yellow MB into rebounder  step back from 8\" step  Wall sits     Rationale: increase ROM and increase strength  to improve the patients ability to improve functional transfers       H min Manual Therapy:     CFM to the patella tendon and quad tendon    The manual therapy interventions were performed at a separate and distinct time from the therapeutic activities interventions.   Rationale: decrease pain, increase ROM, and increase tissue extensibility to improve stability to stair negotiation         With   [] TE   [] TA   [] neuro   [] other: Patient Education: [x] Review HEP    [] Progressed/Changed HEP based on:   [] positioning   [] body mechanics   [] transfers   [] heat/ice application    [] other:      Other Objective/Functional Measures:   Noted muscle quivering with wall sits on the L VMO  Added [place] Estim to maximize contraction of the L VMO to improve overall functional strengthening    Pain Level (0-10 scale) post treatment: 2/10    ASSESSMENT/Changes in Function:   Pt continues to present with reduced quad strength noted with functional activities and therefore, limiting tolerance to plyometric exercises. Added [place] estim with functional strengthening to maximize contraction of the VMO. Assess effects NV. Patient will continue to benefit from skilled PT services to modify and progress therapeutic interventions, address functional mobility deficits, address ROM deficits, address strength deficits, analyze and address soft tissue restrictions, and address imbalance/dizziness to attain remaining goals. [x]  See Plan of Care  []  See progress note/recertification  []  See Discharge Summary         Progress towards goals / Updated goals:  New Goals to be achieved in __4__  weeks:  1. Pt will be able to perform a SL squat without pain and good form to return to PLOF. 2.  Pt will be able to squat with 40# without pain in the L knee to return to work related activities. 3.  Pt will be able to hold a wall sit for 60 seconds as an indicator of improved functional quad strength/endurance. Unable - patient is able to perform wall slides with no hold on door 12/1/2022   4. Pt will be able to perform front/lateral line jumps for 30 seconds without pain to return to running.        PLAN  [x]  Upgrade activities as tolerated     [x]  Continue plan of care  []  Update interventions per flow sheet       []  Discharge due to:_  [x]  Other: Check goals JAGDISH Patrick DPT 12/6/2022  154 PM    Future Appointments   Date Time Provider Ayaz Kelly   12/9/2022  9:15 AM Tania Galaviz DPT [facility]   12/13/2022  9:15 AM Tania Galaviz DPT [facility]   12/16/2022  9:15 AM Tania Galaviz DPT [facility]   12/20/2022 10:00 AM Tania Galaviz DPT [facility]   12/22/2022 10:00 AM Tania Galaviz DPT [facility]

## 2022-12-09 ENCOUNTER — HOSPITAL ENCOUNTER (OUTPATIENT)
Dept: PHYSICAL THERAPY | Age: 47
Discharge: HOME | End: 2022-12-09
Payer: COMMERCIAL

## 2022-12-09 PROCEDURE — 97112 NEUROMUSCULAR REEDUCATION: CPT | Performed by: PHYSICAL THERAPIST

## 2022-12-09 PROCEDURE — 97014 ELECTRIC STIMULATION THERAPY: CPT | Performed by: PHYSICAL THERAPIST

## 2022-12-09 PROCEDURE — 97110 THERAPEUTIC EXERCISES: CPT | Performed by: PHYSICAL THERAPIST

## 2022-12-09 PROCEDURE — 97530 THERAPEUTIC ACTIVITIES: CPT | Performed by: PHYSICAL THERAPIST

## 2022-12-09 PROCEDURE — 97140 MANUAL THERAPY 1/> REGIONS: CPT | Performed by: PHYSICAL THERAPIST

## 2022-12-09 NOTE — PROGRESS NOTES
PT DAILY TREATMENT NOTE     Patient Name: Hero Cano  Date:2022  : 1975  [x]  Patient  Verified  Payor: BLUE CROSS / Plan: [employer] [facility] [place] / Product Type: PPO /    In time:915   Out time: 1029  Total Treatment Time (min): 74  Visit #: 3 of     Medicare/BCBS Only   Total Timed Codes (min):64 1:1 Treatment Time: 54       Treatment Area: Left knee pain [M25.562]    SUBJECTIVE  Pain Level (0-10 scale): 0/10  Any medication changes, allergies to medications, adverse drug reactions, diagnosis change, or new procedure performed?: [x] No    [] Yes (see summary sheet for update)  Subjective functional status/changes:   [] No changes reported  Pt reports that he was sore after last session and this quad was on fire from the E-stim. OBJECTIVE    Modality rationale: decrease edema, decrease inflammation, and decrease pain to improve the patients ability to reduce pain with standing activities.     Min Type Additional Details   10 [x] Estim:  [x]Unatt       [x]IFC  []Premod                        []Other:  [x]w/ice   []w/heat  Position: reclined   Location: L knee     [] Estim: []Att    []TENS instruct  []NMES                    []Other:  []w/US   []w/ice   []w/heat  Position:  Location:    []  Traction: [] Cervical       []Lumbar                       [] Prone          []Supine                       []Intermittent   [][]Continuous Lbs:   before manual  [] after manual   H [x]  Ultrasound: []Continuous   [x] Pulsed                           [x]1MHz   []3MHz W/cm2: 1.5 50%  Location: medial aspect of the patella tendon     []  Iontophoresis with dexamethasone         Location: [] Take home patch   [] In clinic    []  Ice     []  heat  []  Ice massage  []  Laser   []  Anodyne Position:   Location:     []  Laser with stim  []  Other:  Position:  Location:    []  Vasopneumatic Device  Pre-treatment girth:   Post-treatment girth:   Measured at landmark location:  Pressure:       [] lo [] med [] hi   Temperature: [] lo [] med [] hi   [] Skin assessment post-treatment:  []intact []redness- no adverse reaction    []redness - adverse reaction:   Vasopnuematic compression justification:  Per bilateral girth measures taken and listed above the edema is considered significant and having an impact on the patient's strength, balance, gait, transfers, self care, and ADLs    29 min Therapeutic Exercise:  [x] See flow sheet :  Bike for ROM  Incline/HS stretch  Stationary lunges -\  Single leg bridge  with BOSU  Hip adductor stretch on step       Rationale: increase ROM and increase strength to improve the patients ability to improve gait mechanics     10 min Neuromuscular Re-education:  [x]  See flow sheet :  [place] to the L quad 10/10 with SAQ     Rationale: increase ROM and increase strength  to improve the patients ability to improve functional transfers    15 min Therapeutic Activity:  [x]  See flow sheet :  Black t-band around waist with RDL and #25  Black t-band around waist with squat   Static lunges   SL bridges with BOSU   Rationale: increase ROM and increase strength  to improve the patients ability to improve functional transfers       10 min Manual Therapy:     CFM to the patella tendon and quad tendon    The manual therapy interventions were performed at a separate and distinct time from the therapeutic activities interventions.   Rationale: decrease pain, increase ROM, and increase tissue extensibility to improve stability to stair negotiation         With   [] TE   [] TA   [] neuro   [] other: Patient Education: [x] Review HEP    [] Progressed/Changed HEP based on:   [] positioning   [] body mechanics   [] transfers   [] heat/ice application    [] other:      Other Objective/Functional Measures:   Increased pain/challenge with eccentric contraction of the L quad  50% mobility of the lunge on the L secondary to pain    Pain Level (0-10 scale) post treatment: 2/10    ASSESSMENT/Changes in Function: Pt continues to have pain in the L knee secondary to functional quad weakness. He has noted eccentric quad weakness noted with visual inspection/muscle quivering with lunges and eccentric step downs. Continue to progress as tolerated. Patient will continue to benefit from skilled PT services to modify and progress therapeutic interventions, address functional mobility deficits, address ROM deficits, address strength deficits, analyze and address soft tissue restrictions, and address imbalance/dizziness to attain remaining goals. [x]  See Plan of Care  []  See progress note/recertification  []  See Discharge Summary         Progress towards goals / Updated goals:  New Goals to be achieved in __4__  weeks:  1. Pt will be able to perform a SL squat without pain and good form to return to PLOF. 2.  Pt will be able to squat with 40# without pain in the L knee to return to work related activities. current squatting with 25# 12/9/22   3. Pt will be able to hold a wall sit for 60 seconds as an indicator of improved functional quad strength/endurance. Unable - patient is able to perform wall slides with no hold on door 12/1/2022   4. Pt will be able to perform front/lateral line jumps for 30 seconds without pain to return to running.        PLAN  [x]  Upgrade activities as tolerated     [x]  Continue plan of care  []  Update interventions per flow sheet       []  Discharge due to:_  []  Other:    Jamia Carlson DPT 12/9/2022  1034  PM    Future Appointments   Date Time Provider Ayaz Kelly   12/13/2022  9:15 AM Kiley Knox DPT [facility]   12/16/2022  9:15 AM Kiley Knox DPT [facility]   12/20/2022 10:00 AM Kiley Knox DPT [facility]   12/22/2022 10:00 AM Kiley Knox DPT [facility]

## 2022-12-13 ENCOUNTER — APPOINTMENT (OUTPATIENT)
Dept: PHYSICAL THERAPY | Age: 47
End: 2022-12-13
Payer: COMMERCIAL

## 2022-12-14 ENCOUNTER — APPOINTMENT (OUTPATIENT)
Dept: PHYSICAL THERAPY | Age: 47
End: 2022-12-14
Payer: COMMERCIAL

## 2022-12-16 ENCOUNTER — HOSPITAL ENCOUNTER (OUTPATIENT)
Dept: PHYSICAL THERAPY | Age: 47
Discharge: HOME | End: 2022-12-16
Payer: COMMERCIAL

## 2022-12-16 PROCEDURE — 97014 ELECTRIC STIMULATION THERAPY: CPT | Performed by: PHYSICAL THERAPIST

## 2022-12-16 PROCEDURE — 97112 NEUROMUSCULAR REEDUCATION: CPT | Performed by: PHYSICAL THERAPIST

## 2022-12-16 PROCEDURE — 97110 THERAPEUTIC EXERCISES: CPT | Performed by: PHYSICAL THERAPIST

## 2022-12-16 NOTE — PROGRESS NOTES
PT DAILY TREATMENT NOTE     Patient Name: Camden Knowles  Date:2022  : 1975  [x]  Patient  Verified  Payor: BLUE CROSS / Plan: [employer] [facility] [place] / Product Type: PPO /    In time:915   Out time: 1025  Total Treatment Time (min): 70  Visit #: 4 of     Medicare/BCBS Only   Total Timed Codes (min):55 1:1 Treatment Time: 55       Treatment Area: Left knee pain [M25.562]    SUBJECTIVE  Pain Level (0-10 scale): 0/10  Any medication changes, allergies to medications, adverse drug reactions, diagnosis change, or new procedure performed?: [x] No    [] Yes (see summary sheet for update)  Subjective functional status/changes:   [] No changes reported  Pt reports 80% improvement since SOC. He reports improvements in overall strength and balance/stability. Functional limitations include running, carrying weight > 40-50# to return to work full duty. OBJECTIVE    Modality rationale: decrease edema, decrease inflammation, and decrease pain to improve the patients ability to reduce pain with standing activities.     Min Type Additional Details   15 [x] Estim:  [x]Unatt       [x]IFC  []Premod                        []Other:  [x]w/ice   []w/heat  Position: reclined   Location: L knee     [] Estim: []Att    []TENS instruct  []NMES                    []Other:  []w/US   []w/ice   []w/heat  Position:  Location:    []  Traction: [] Cervical       []Lumbar                       [] Prone          []Supine                       []Intermittent   [][]Continuous Lbs:   before manual  [] after manual   H [x]  Ultrasound: []Continuous   [x] Pulsed                           [x]1MHz   []3MHz W/cm2: 1.5 50%  Location: medial aspect of the patella tendon     []  Iontophoresis with dexamethasone         Location: [] Take home patch   [] In clinic    []  Ice     []  heat  []  Ice massage  []  Laser   []  Anodyne Position:   Location:     []  Laser with stim  []  Other:  Position:  Location:    []  Vasopneumatic Device  Pre-treatment girth:   Post-treatment girth:   Measured at landmark location:  Pressure:       [] lo [] med [] hi   Temperature: [] lo [] med [] hi   [] Skin assessment post-treatment:  []intact []redness- no adverse reaction    []redness - adverse reaction:   Vasopnuematic compression justification:  Per bilateral girth measures taken and listed above the edema is considered significant and having an impact on the patient's strength, balance, gait, transfers, self care, and ADLs    45 min Therapeutic Exercise:  [x] See flow sheet :  Bike for ROM  Incline/HS stretch  Stationary lunges -\  PT reassessment        Rationale: increase ROM and increase strength to improve the patients ability to improve gait mechanics     10 min Neuromuscular Re-education:  [x]  See flow sheet :  [place] to the L quad 10/10 with SAQ     Rationale: increase ROM and increase strength  to improve the patients ability to improve functional transfers    H min Therapeutic Activity:  [x]  See flow sheet :  Black t-band around waist with RDL and #25  Black t-band around waist with squat   Static lunges   SL bridges with BOSU   Rationale: increase ROM and increase strength  to improve the patients ability to improve functional transfers       H min Manual Therapy: The manual therapy interventions were performed at a separate and distinct time from the therapeutic activities interventions. Rationale: decrease pain, increase ROM, and increase tissue extensibility to improve stability to stair negotiation         With   [] TE   [] TA   [] neuro   [] other: Patient Education: [x] Review HEP    [] Progressed/Changed HEP based on:   [] positioning   [] body mechanics   [] transfers   [] heat/ice application    [] other:      Other Objective/Functional Measures:   Front/back jumps: not equal weight through B LE and not consistent jumps. Hesitation noted due to instability.    Side to side jumps: 25 seconds before onset of pain  Wall sit: 52 seconds before onset of pain 1/10   Able to perform 1/4 SL squat. He was able to perform 3 or 4 before onset of pain. Pain Level (0-10 scale) post treatment: 0/10    ASSESSMENT/Changes in Function:   Upon evaluation, pt presents with improved functional quad strength on the L compared to last month with hop test and ability to perform SL squat/wall sit however, there is still notable weakness as he is unable to perform for the full length of time without pain. He would benefit from a course of skilled PT to further improve functional mobility to be able to return to running without pain to return to full duty at work. Patient will continue to benefit from skilled PT services to modify and progress therapeutic interventions, address functional mobility deficits, address ROM deficits, address strength deficits, analyze and address soft tissue restrictions, and address imbalance/dizziness to attain remaining goals. [x]  See Plan of Care  []  See progress note/recertification  []  See Discharge Summary         Progress towards goals / Updated goals:  New Goals to be achieved in __4__  weeks:  1. Pt will be able to perform a SL squat without pain and good form to return to PLOF. Pt was able to perform 1/4 squat with pain 12/16/22    2. Pt will be able to squat with 40# without pain in the L knee to return to work related activities. current squatting with 25# 12/16/22   3. Pt will be able to hold a wall sit for 60 seconds as an indicator of improved functional quad strength/endurance. Pt able to perform 52 seconds with pain 12/16/22   4. Pt will be able to perform front/lateral line jumps for 30 seconds without pain to return to running.  Progressing 12/16/22       PLAN  [x]  Upgrade activities as tolerated     [x]  Continue plan of care  []  Update interventions per flow sheet       []  Discharge due to:_  [x]  Other: Continue PT 2-3x/week for 4 weeks     Dolphus Sandhoff, DPT 12/16/2022  1001 PM    Future Appointments   Date Time Provider Ayaz Kelly   12/20/2022 10:00 AM Javed Yuan DPT [facility]   12/22/2022 10:00 AM Javed Yuan DPT [facility]

## 2022-12-16 NOTE — PROGRESS NOTES
[address] [facility] AT [facility]  [address] [facility] [address] [place] [address],[address], Terrance jenkins, Arnie Moore  Phone [phone]  Fax [phone]  PROGRESS NOTE  Patient Name: Santhosh Nguyễn : 1975   Treatment/Medical Diagnosis: Left knee pain [M25.562]   Referral Source: Clau Boudreaux MD     Date of Initial Visit: 10/18/22 Attended Visits: 14 Missed Visits: 2     SUMMARY OF TREATMENT  Pt seen for 14 PT visits sp L menisectomy on 10/6/22. CURRENT STATUS  Pt reports 80% improvement since [facility]. He reports improvements in overall strength and balance/stability. Functional limitations include running, carrying weight > 40-50# to return to work full duty. Upon evaluation, pt presents with improved functional quad strength on the L compared to last month with hop test and ability to perform SL squat/wall sit however, there is still notable weakness as he is unable to perform for the full length of time without pain. He would benefit from a course of skilled PT to further improve functional mobility to be able to return to running without pain to return to full duty at work. Other Objective/Functional Measures:   Front/back jumps: not equal weight through B LE and not consistent jumps. Hesitation noted due to instability. Side to side jumps: 25 seconds before onset of pain  Wall sit: 52 seconds before onset of pain 1/10   Able to perform 1/4 SL squat. He was able to perform 3 or 4 before onset of pain.      Other Objective/Functional Measures: taken on 22  FOTO: improved to 65/100 from 44/100 at last assessment  AROM of the L knee: 0-135 deg  B gross hip strength: 5/5  Double leg jumps: 30 sec- anterior knee pain at the end with knee pop  Lateral line jumps: 30 seconds (increased pain jumping to the L, does not like to push off to jump towards)  He is able to hold a front plank for 1 min with good form  Wall sit: able to hold for 40 seconds before increased pain in the L knee especially the release  Attempted jog 50'x2 with pain and abnormal pushoff on the L    Goal/Measure of Progress Goal Met? 1. Pt will be able to perform a SL squat without pain and good form to return to PLOF. Pt was able to perform 1/4 squat with pain 12/16/22    2. Pt will be able to squat with 40# without pain in the L knee to return to work related activities. current squatting with 25# 12/16/22   3. Pt will be able to hold a wall sit for 60 seconds as an indicator of improved functional quad strength/endurance. Pt able to perform 52 seconds with pain 12/16/22   4. Pt will be able to perform front/lateral line jumps for 30 seconds without pain to return to running. Progressing 12/16/22       New Goals to be achieved in __4__  weeks:  1. Pt will be able to perform a SL squat without pain and good form to return to PLOF. 2.  Pt will be able to squat with 40# without pain in the L knee to return to work related activities. 3.  Pt will be able to hold a wall sit for 60 seconds as an indicator of improved functional quad strength/endurance. 4.  Pt will be able to perform front/lateral line jumps for 30 seconds without pain to return to running. RECOMMENDATIONS  Continue PT 2-3x/week for 4 weeks to progress towards long-term goals   If you have any questions/comments please contact us directly at [phone]. Thank you for allowing us to assist in the care of your patient. Therapist Signature: Leopoldo Cords, DPT Date: 12/16/2022     Time: 10:19 AM   NOTE TO PHYSICIAN:  PLEASE COMPLETE THE ORDERS BELOW AND FAX TO   [facility]: [phone].   If you are unable to process this request in 24 hours please contact our office: [phone].    ___ I have read the above report and request that my patient continue as recommended.   ___ I have read the above report and request that my patient continue therapy with the following changes/special instructions:_________________________________________________________   ___ I have read the above report and request that my patient be discharged from therapy.      Physician Signature:        Date:       Time:    Sage Qiu MD

## 2022-12-20 ENCOUNTER — HOSPITAL ENCOUNTER (OUTPATIENT)
Dept: PHYSICAL THERAPY | Age: 47
Discharge: HOME | End: 2022-12-20
Payer: COMMERCIAL

## 2022-12-20 PROCEDURE — 97530 THERAPEUTIC ACTIVITIES: CPT | Performed by: PHYSICAL THERAPIST

## 2022-12-20 PROCEDURE — 97014 ELECTRIC STIMULATION THERAPY: CPT | Performed by: PHYSICAL THERAPIST

## 2022-12-20 PROCEDURE — 97110 THERAPEUTIC EXERCISES: CPT | Performed by: PHYSICAL THERAPIST

## 2022-12-20 PROCEDURE — 97140 MANUAL THERAPY 1/> REGIONS: CPT | Performed by: PHYSICAL THERAPIST

## 2022-12-20 NOTE — PROGRESS NOTES
PT DAILY TREATMENT NOTE     Patient Name: Roseanne Loera  Date:2022  : 1975  [x]  Patient  Verified  Payor: BLUE CROSS / Plan: [address] [place] [address] [place] / Product Type: PPO /    In time:1000   Out time: 1115  Total Treatment Time (min): 75  Visit #: 5 of     Medicare/BCBS Only   Total Timed Codes (min):60 1:1 Treatment Time: 60       Treatment Area: Left knee pain [M25.562]    SUBJECTIVE  Pain Level (0-10 scale): 0/10  Any medication changes, allergies to medications, adverse drug reactions, diagnosis change, or new procedure performed?: [x] No    [] Yes (see summary sheet for update)  Subjective functional status/changes:   [] No changes reported  Pt reports that his knee over the patellar tendon    OBJECTIVE    Modality rationale: decrease edema, decrease inflammation, and decrease pain to improve the patients ability to reduce pain with standing activities.     Min Type Additional Details   15 [x] Estim:  [x]Unatt       [x]IFC  []Premod                        []Other:  [x]w/ice   []w/heat  Position: reclined   Location: L knee     [] Estim: []Att    []TENS instruct  []NMES                    []Other:  []w/US   []w/ice   []w/heat  Position:  Location:    []  Traction: [] Cervical       []Lumbar                       [] Prone          []Supine                       []Intermittent   [][]Continuous Lbs:   before manual  [] after manual   H [x]  Ultrasound: []Continuous   [x] Pulsed                           [x]1MHz   []3MHz W/cm2: 1.5 50%  Location: medial aspect of the patella tendon     []  Iontophoresis with dexamethasone         Location: [] Take home patch   [] In clinic    []  Ice     []  heat  []  Ice massage  []  Laser   []  Anodyne Position:   Location:     []  Laser with stim  []  Other:  Position:  Location:    []  Vasopneumatic Device  Pre-treatment girth:   Post-treatment girth:   Measured at landmark location:  Pressure:       [] lo [] med [] hi   Temperature: [] lo [] med [] hi [] Skin assessment post-treatment:  []intact []redness- no adverse reaction    []redness - adverse reaction:   Vasopnuematic compression justification:  Per bilateral girth measures taken and listed above the edema is considered significant and having an impact on the patient's strength, balance, gait, transfers, self care, and ADLs    30 min Therapeutic Exercise:  [x] See flow sheet :  Bike for ROM  Incline/HS stretch  Stationary lunges   Clamshells x2        Rationale: increase ROM and increase strength to improve the patients ability to improve gait mechanics     H min Neuromuscular Re-education:  [x]  See flow sheet :       Rationale: increase ROM and increase strength  to improve the patients ability to improve functional transfers    15 min Therapeutic Activity:  [x]  See flow sheet :  Black t-band around waist with RDL and #25  Static lunges   Step backs 8\"   Rationale: increase ROM and increase strength  to improve the patients ability to improve functional transfers       15 min Manual Therapy:  DTM to the L patellar tendon with [place] to the L Patellar tendon tool number 4. The manual therapy interventions were performed at a separate and distinct time from the therapeutic activities interventions. Rationale: decrease pain, increase ROM, and increase tissue extensibility to improve stability to stair negotiation         With   [] TE   [] TA   [] neuro   [] other: Patient Education: [x] Review HEP    [] Progressed/Changed HEP based on:   [] positioning   [] body mechanics   [] transfers   [] heat/ice application    [] other:      Other Objective/Functional Measures:   Limited tolerance to therex secondary to increased low back pain due to kidney stones    Pain Level (0-10 scale) post treatment: 0/10    ASSESSMENT/Changes in Function:   Pt tolerated progressed eccentric quad exercises however, continues to be limited but low back pain secondary to kidney stones.  Continue to progress as tolerated. Patient will continue to benefit from skilled PT services to modify and progress therapeutic interventions, address functional mobility deficits, address ROM deficits, address strength deficits, analyze and address soft tissue restrictions, and address imbalance/dizziness to attain remaining goals. [x]  See Plan of Care  []  See progress note/recertification  []  See Discharge Summary         Progress towards goals / Updated goals:  1. Pt will be able to perform a SL squat without pain and good form to return to PLOF. 2.  Pt will be able to squat with 40# without pain in the L knee to return to work related activities. 3.  Pt will be able to hold a wall sit for 60 seconds as an indicator of improved functional quad strength/endurance. 4.  Pt will be able to perform front/lateral line jumps for 30 seconds without pain to return to running.       PLAN  [x]  Upgrade activities as tolerated     [x]  Continue plan of care  []  Update interventions per flow sheet       []  Discharge due to:_  [x]  Other: check goals [place], first visit after PN    Sarah Khanna DPT 12/20/2022  1143 AM    Future Appointments   Date Time Provider Ayaz Kelly   12/22/2022 10:00 AM Ezio Castillo DPT [facility]   12/29/2022 10:45 AM Kala Harris PTA [facility]   12/30/2022  9:15 AM Kala Harris PTA [facility]   1/5/2023  2:30 PM Kala Harris PTA [facility]   1/6/2023  9:30 AM Ezio Castillo DPT [facility]   1/10/2023  9:00 AM Ezio Castillo DPT [facility]   1/11/2023  9:30 AM Ezio Castillo DPT [facility]   1/17/2023  9:30 AM Kala Harris PTA [facility]   1/20/2023  9:30 AM Kala Harris PTA [facility]

## 2022-12-22 ENCOUNTER — HOSPITAL ENCOUNTER (OUTPATIENT)
Dept: PHYSICAL THERAPY | Age: 47
Discharge: HOME | End: 2022-12-22
Payer: COMMERCIAL

## 2022-12-22 PROCEDURE — 97110 THERAPEUTIC EXERCISES: CPT | Performed by: PHYSICAL THERAPIST

## 2022-12-22 PROCEDURE — 97014 ELECTRIC STIMULATION THERAPY: CPT | Performed by: PHYSICAL THERAPIST

## 2022-12-22 PROCEDURE — 97530 THERAPEUTIC ACTIVITIES: CPT | Performed by: PHYSICAL THERAPIST

## 2022-12-22 NOTE — PROGRESS NOTES
dPT DAILY TREATMENT NOTE     Patient Name: Hermelinda Brennan  Date:2022  : 1975  [x]  Patient  Verified  Payor: BLUE CROSS / Plan: [address] [place] [address] [place] / Product Type: PPO /    In time:1000   Out time: 1105  Total Treatment Time (min): 65  Visit #: 2 of     Medicare/BCBS Only   Total Timed Codes (min):50  1:1 Treatment Time: 41        Treatment Area: Left knee pain [M25.562]    SUBJECTIVE  Pain Level (0-10 scale): 0/10  Any medication changes, allergies to medications, adverse drug reactions, diagnosis change, or new procedure performed?: [x] No    [] Yes (see summary sheet for update)  Subjective functional status/changes:   [] No changes reported  Pt reports that his knee feels better. He reports its the best he as felt in awhile. OBJECTIVE    Modality rationale: decrease edema, decrease inflammation, and decrease pain to improve the patients ability to reduce pain with standing activities.     Min Type Additional Details   15 [x] Estim:  [x]Unatt       [x]IFC  []Premod                        []Other:  [x]w/ice   []w/heat  Position: reclined   Location: L knee     [] Estim: []Att    []TENS instruct  []NMES                    []Other:  []w/US   []w/ice   []w/heat  Position:  Location:    []  Traction: [] Cervical       []Lumbar                       [] Prone          []Supine                       []Intermittent   [][]Continuous Lbs:   before manual  [] after manual   H [x]  Ultrasound: []Continuous   [x] Pulsed                           [x]1MHz   []3MHz W/cm2: 1.5 50%  Location: medial aspect of the patella tendon     []  Iontophoresis with dexamethasone         Location: [] Take home patch   [] In clinic    []  Ice     []  heat  []  Ice massage  []  Laser   []  Anodyne Position:   Location:     []  Laser with stim  []  Other:  Position:  Location:    []  Vasopneumatic Device  Pre-treatment girth:   Post-treatment girth:   Measured at landmark location:  Pressure:       [] lo [] med [] hi   Temperature: [] lo [] med [] hi   [] Skin assessment post-treatment:  []intact []redness- no adverse reaction    []redness - adverse reaction:   Vasopnuematic compression justification:  Per bilateral girth measures taken and listed above the edema is considered significant and having an impact on the patient's strength, balance, gait, transfers, self care, and ADLs    25 min Therapeutic Exercise:  [x] See flow sheet :  Bike for ROM  Incline/HS stretch  Clamshells x2        Rationale: increase ROM and increase strength to improve the patients ability to improve gait mechanics     H min Neuromuscular Re-education:  [x]  See flow sheet :  BOSU squats     Rationale: increase ROM and increase strength  to improve the patients ability to improve functional transfers    25 min Therapeutic Activity:  [x]  See flow sheet :  Walking lunges  Speed squats  Speed step ups   Rationale: increase ROM and increase strength  to improve the patients ability to improve functional transfers       H min Manual Therapy:  DTM to the L patellar tendon with [place] to the L Patellar tendon tool number 4. The manual therapy interventions were performed at a separate and distinct time from the therapeutic activities interventions. Rationale: decrease pain, increase ROM, and increase tissue extensibility to improve stability to stair negotiation         With   [] TE   [] TA   [] neuro   [] other: Patient Education: [x] Review HEP    [] Progressed/Changed HEP based on:   [] positioning   [] body mechanics   [] transfers   [] heat/ice application    [] other:      Other Objective/Functional Measures:   Pt was able to perform 20 reps, then 21 reps for speed squats to low mat table. -increased slight discomfort with walking lunges in the L anterior knee.    -increased soreness in the low back with BOSU squats    Pain Level (0-10 scale) post treatment: 0/10    ASSESSMENT/Changes in Function:   Focused treatment on functional speed to improve quad control and stability. He continues to have low back pain secondary to kidney stone. Continue to progress as tolerated. Patient will continue to benefit from skilled PT services to modify and progress therapeutic interventions, address functional mobility deficits, address ROM deficits, address strength deficits, analyze and address soft tissue restrictions, and address imbalance/dizziness to attain remaining goals. [x]  See Plan of Care  []  See progress note/recertification  []  See Discharge Summary         Progress towards goals / Updated goals:  1. Pt will be able to perform a SL squat without pain and good form to return to PLOF. 2.  Pt will be able to squat with 40# without pain in the L knee to return to work related activities. 3.  Pt will be able to hold a wall sit for 60 seconds as an indicator of improved functional quad strength/endurance. 4.  Pt will be able to perform front/lateral line jumps for 30 seconds without pain to return to running. initiated speed drills 12/22/22      PLAN  [x]  Upgrade activities as tolerated     [x]  Continue plan of care  []  Update interventions per flow sheet       []  Discharge due to:_  [x]  Other: check goals Lillie DUBON DPT 12/22/2022  1155 AM    Future Appointments   Date Time Provider Ayaz Kelly   12/29/2022 10:45 AM Silvina Soliz PTA [facility]   12/30/2022  9:15 AM Silvina Soliz PTA [facility]   1/5/2023  2:30 PM Silvina Soliz PTA [facility]   1/6/2023  9:30 AM Kellie Jackson DPT [facility]   1/10/2023  9:00 AM Kellie Jackson DPT [facility]   1/13/2023 11:00 AM Silvina Soliz PTA [facility]   1/17/2023  9:30 AM Silvina Soliz PTA [facility]   1/20/2023  9:30 AM Silvina Soliz PTA [facility]

## 2022-12-29 ENCOUNTER — HOSPITAL ENCOUNTER (OUTPATIENT)
Dept: PHYSICAL THERAPY | Age: 47
End: 2022-12-29
Payer: COMMERCIAL

## 2022-12-29 PROCEDURE — 97112 NEUROMUSCULAR REEDUCATION: CPT

## 2022-12-29 PROCEDURE — 97140 MANUAL THERAPY 1/> REGIONS: CPT

## 2022-12-29 PROCEDURE — 97014 ELECTRIC STIMULATION THERAPY: CPT

## 2022-12-29 NOTE — PROGRESS NOTES
dPT DAILY TREATMENT NOTE     Patient Name: Jaime Lerner  Date:2022  : 1975  [x]  Patient  Verified  Payor: BLUE CROSS / Plan: [facility] / Product Type: PPO /    In time:10:45   Out time: 12:09  Total Treatment Time (min): 84  Visit #: 3 of     Medicare/BCBS Only   Total Timed Codes (min):64 1:1 Treatment Time: 30       Treatment Area: Left knee pain [M25.562]    SUBJECTIVE  Pain Level (0-10 scale): 0/10  Any medication changes, allergies to medications, adverse drug reactions, diagnosis change, or new procedure performed?: [x] No    [] Yes (see summary sheet for update)  Subjective functional status/changes:   [] No changes reported  It is mainly to the back of the knee like in the calf - the part that to both sides of the back of the knee, the patella/quad tendon on the front of the knee feels better     OBJECTIVE    Modality rationale: decrease edema, decrease inflammation, and decrease pain to improve the patients ability to reduce pain with standing activities. Min Type Additional Details   15+5 set up [x] Estim:  [x]Unatt       [x]IFC  []Premod                        []Other:  [x]w/ice anterior   [x]w/heat posterior  Position: reclined   Location: L knee     [] Estim: []Att    []TENS instruct  []NMES                    []Other:  []w/US   []w/ice   []w/heat  Position:  Location:    []  Traction: [] Cervical       []Lumbar                       [] Prone          []Supine                       []Intermittent   [][]Continuous Lbs:   before manual  [] after manual   8 [x]  Ultrasound: []Continuous   [x] Pulsed                           [x]1MHz   []3MHz W/cm2: 1.5 cont.    Location: posterior - knee medial aspect of popliteus origin,  and gastro    []  Iontophoresis with dexamethasone         Location: [] Take home patch   [] In clinic    []  Ice     []  heat  []  Ice massage  []  Laser   []  Anodyne Position:   Location:     []  Laser with stim  []  Other: Position:  Location:    []  Vasopneumatic Device  Pre-treatment girth:   Post-treatment girth:   Measured at landmark location:  Pressure:       [] lo [] med [] hi   Temperature: [] lo [] med [] hi   [] Skin assessment post-treatment:  []intact []redness- no adverse reaction    []redness - adverse reaction:   Vasopnuematic compression justification:  Per bilateral girth measures taken and listed above the edema is considered significant and having an impact on the patient's strength, balance, gait, transfers, self care, and ADLs    10 min Therapeutic Exercise:  [x] See flow sheet :  Bike for ROM  Incline/HS stretch         Rationale: increase ROM and increase strength to improve the patients ability to improve gait mechanics     18 min Neuromuscular Re-education:  [x]  See flow sheet :  Jump squats  with shuffle backwards with black t-band around waist for resistance with forward jump  Hip burners - hip abduction, CW and CCW circles for 30 sec each way     Rationale: increase ROM and increase strength  to improve the patients ability to improve functional transfers    16 min Therapeutic Activity:  [x]  See flow sheet :  Walking lunges - forward and reverse   Speed squats  Speed step ups   Rationale: increase ROM and increase strength  to improve the patients ability to improve functional transfers       12 min Manual Therapy:  DTM to the  medial aspect of popliteus origin,  and gastro and to lateral aspect gastro/soleus  with Graston tendon tool number 3. The manual therapy interventions were performed at a separate and distinct time from the therapeutic activities interventions.   Rationale: decrease pain, increase ROM, and increase tissue extensibility to improve stability to stair negotiation         With   [] TE   [] TA   [] neuro   [] other: Patient Education: [x] Review HEP    [] Progressed/Changed HEP based on:   [] positioning   [] body mechanics   [] transfers   [] heat/ice application    [] other: Other Objective/Functional Measures:   Jump squats  with shuffle backwards with black t-band around waist for resistance with forward jump  Hip burners - hip abduction, CW and CCW circles for 30 sec each way  Added reverse lunges       GOALS  Pt will be able to hold a wall sit for 60 seconds as an indicator of improved functional quad strength/endurance. Patient was able to hold 30 sec wall sit with only slight discomfort 12/29/2022    Pain Level (0-10 scale) post treatment: 0/10    ASSESSMENT/Changes in Function:   Patient presented severe tightness/trigger point to the posterior - knee medial aspect of popliteus origin,  and gastro perform manual to area followed by continuous ultrasound. Patient reported increase tolerance with lunges, jumping and wall sit when it comes to the anterior knee/patella/quad tendon      Patient will continue to benefit from skilled PT services to modify and progress therapeutic interventions, address functional mobility deficits, address ROM deficits, address strength deficits, analyze and address soft tissue restrictions, and address imbalance/dizziness to attain remaining goals. [x]  See Plan of Care  []  See progress note/recertification  []  See Discharge Summary         Progress towards goals / Updated goals:  1. Pt will be able to perform a SL squat without pain and good form to return to PLOF. 2.  Pt will be able to squat with 40# without pain in the L knee to return to work related activities. 3.  Pt will be able to hold a wall sit for 60 seconds as an indicator of improved functional quad strength/endurance. Patient was able to hold 30 sec wall sit with only slight discomfort 12/29/2022   4. Pt will be able to perform front/lateral line jumps for 30 seconds without pain to return to running. initiated speed drills 12/22/22      PLAN  [x]  Upgrade activities as tolerated     [x]  Continue plan of care  []  Update interventions per flow sheet       []  Discharge due to:_  []  Other:     Milly Kapoor, SANTOS 12/29/2022  1155 AM    Future Appointments   Date Time Provider Ayaz Kelly   12/30/2022  9:15 AM Redmond Mood [facility]   1/5/2023  2:30 PM Redmond Mood [facility]   1/6/2023  9:30 AM Brittany Newman DPT [facility]   1/10/2023  9:00 AM Brittany Newman DPT [facility]   1/13/2023 11:00 AM Redmond Mood [facility]   1/17/2023  9:30 AM Sarah Toure PTA [facility]   1/20/2023  9:30 AM Sarah Toure PTA [facility]

## 2022-12-30 ENCOUNTER — HOSPITAL ENCOUNTER (OUTPATIENT)
Dept: PHYSICAL THERAPY | Age: 47
End: 2022-12-30
Payer: COMMERCIAL

## 2022-12-30 PROCEDURE — 97140 MANUAL THERAPY 1/> REGIONS: CPT

## 2022-12-30 PROCEDURE — 97014 ELECTRIC STIMULATION THERAPY: CPT

## 2022-12-30 PROCEDURE — 97110 THERAPEUTIC EXERCISES: CPT

## 2022-12-30 PROCEDURE — 97530 THERAPEUTIC ACTIVITIES: CPT

## 2022-12-30 NOTE — PROGRESS NOTES
dPT DAILY TREATMENT NOTE     Patient Name: Matt Mane  Date:2022  : 1975  [x]  Patient  Verified  Payor: BLUE CROSS / Plan: [employer] [place] [address] [employer] / Product Type: PPO /    In time:9:15  Out time: 10:36  Total Treatment Time (min): 81  Visit #: 4 of     Medicare/BCBS Only   Total Timed Codes (min):61 1:1 Treatment Time: 51       Treatment Area: Left knee pain [M25.562]    SUBJECTIVE  Pain Level (0-10 scale): 1/10  Any medication changes, allergies to medications, adverse drug reactions, diagnosis change, or new procedure performed?: [x] No    [] Yes (see summary sheet for update)  Subjective functional status/changes:   [] No changes reported  I am sore from where you worked yesterday on it and then this morning I only felt a little twinge to the front patella when coming down the stairs it morning     OBJECTIVE    Modality rationale: decrease edema, decrease inflammation, and decrease pain to improve the patients ability to reduce pain with standing activities. Min Type Additional Details   15+5 set up [x] Estim:  [x]Unatt       [x]IFC  []Premod                        []Other:  [x]w/ice anterior   [x]w/heat posterior  Position: reclined   Location: L knee     [] Estim: []Att    []TENS instruct  []NMES                    []Other:  []w/US   []w/ice   []w/heat  Position:  Location:    []  Traction: [] Cervical       []Lumbar                       [] Prone          []Supine                       []Intermittent   [][]Continuous Lbs:   before manual  [] after manual   8 [x]  Ultrasound: []Continuous   [x] Pulsed                           [x]1MHz   []3MHz W/cm2: 1.5 cont.    Location: posterior - knee medial aspect of popliteus origin,  and gastro    []  Iontophoresis with dexamethasone         Location: [] Take home patch   [] In clinic    []  Ice     []  heat  []  Ice massage  []  Laser   []  Anodyne Position:   Location:     []  Laser with stim  []  Other:  Position:  Location:    [] Vasopneumatic Device  Pre-treatment girth:   Post-treatment girth:   Measured at landmark location:  Pressure:       [] lo [] med [] hi   Temperature: [] lo [] med [] hi   [] Skin assessment post-treatment:  []intact []redness- no adverse reaction    []redness - adverse reaction:   Vasopnuematic compression justification:  Per bilateral girth measures taken and listed above the edema is considered significant and having an impact on the patient's strength, balance, gait, transfers, self care, and ADLs    15 min Therapeutic Exercise:  [x] See flow sheet :  Bike for ROM  Incline/HS stretch/quad stretch          Rationale: increase ROM and increase strength to improve the patients ability to improve gait mechanics     held min Neuromuscular Re-education:  [x]  See flow sheet :  Jump squats  with shuffle backwards with black t-band around waist for resistance with forward jump  Hip burners - hip abduction, CW and CCW circles for 30 sec each way     Rationale: increase ROM and increase strength  to improve the patients ability to improve functional transfers    15 min Therapeutic Activity:  [x]  See flow sheet :  Functional DF stretch   Side lying (L) ankle eversion   Wall slides     Walking lunges - forward and reverse   Speed squats  Speed step ups   Rationale: increase ROM and increase strength  to improve the patients ability to improve functional transfers       15+8 cupping min Manual Therapy:  cupping to hamstring and calf  Passive stretching of popliteus  Gentle joint mobs to increase ankle motion for inversion/eversion and DF       The manual therapy interventions were performed at a separate and distinct time from the therapeutic activities interventions.   Rationale: decrease pain, increase ROM, and increase tissue extensibility to improve stability to stair negotiation         With   [] TE   [] TA   [] neuro   [] other: Patient Education: [x] Review HEP    [] Progressed/Changed HEP based on:   [] positioning   [] body mechanics   [] transfers   [] heat/ice application    [] other:      Other Objective/Functional Measures:   Slight change to incline stretch with adding foot inversion and eversion to assist with stretching the popliteus  Added quad stretch   Check ankle inversion/eversion  Added DF functional stretch  Added sidelying ankle eversion   Gentle joint mobs to increase ankle motion for inversion/eversion and DF       Pain Level (0-10 scale) post treatment: 0/10    ASSESSMENT/Changes in Function:     Held above exercises and activities due to increase soreness from yesterday's session and focused more on popliteus and ankle mobility to decrease stress to the knee, patient presents with decrease ankle motion active and passive with inversion,eversion and DF and added additional exercises/activities to improve motion and strength to the ankle assist with decreasing increase stress to the posterior knee    Patient will continue to benefit from skilled PT services to modify and progress therapeutic interventions, address functional mobility deficits, address ROM deficits, address strength deficits, analyze and address soft tissue restrictions, and address imbalance/dizziness to attain remaining goals. [x]  See Plan of Care  []  See progress note/recertification  []  See Discharge Summary         Progress towards goals / Updated goals:  1. Pt will be able to perform a SL squat without pain and good form to return to PLOF. 2.  Pt will be able to squat with 40# without pain in the L knee to return to work related activities. 3.  Pt will be able to hold a wall sit for 60 seconds as an indicator of improved functional quad strength/endurance. Patient was able to hold 30 sec wall sit with only slight discomfort 12/29/2022   4. Pt will be able to perform front/lateral line jumps for 30 seconds without pain to return to running. initiated speed drills 12/22/22      PLAN  [x]  Upgrade activities as tolerated     [x]  Continue plan of care  []  Update interventions per flow sheet       []  Discharge due to:_  []  Other:     Meño Duncan, SANTOS 12/30/2022  1155 AM    Future Appointments   Date Time Provider Ayaz Kelly   1/5/2023  2:30 PM Lucero Loeras [facility]   1/6/2023  9:30 AM Alyx Martinez DPT [facility]   1/10/2023  9:00 AM Alyx Martinez DPT [facility]   1/13/2023 11:00 AM Jalaine Mail, PTA [facility]   1/17/2023  9:30 AM Jalaine Mail, PTA [facility]   1/20/2023  9:30 AM Jalaine Mail, PTA [facility]

## 2023-01-04 PROBLEM — N20.0 KIDNEY STONE: Status: ACTIVE | Noted: 2023-01-04

## 2023-01-05 ENCOUNTER — HOSPITAL ENCOUNTER (OUTPATIENT)
Dept: PHYSICAL THERAPY | Age: 48
Discharge: HOME | End: 2023-01-05
Payer: COMMERCIAL

## 2023-01-05 PROCEDURE — 97014 ELECTRIC STIMULATION THERAPY: CPT

## 2023-01-05 PROCEDURE — 97140 MANUAL THERAPY 1/> REGIONS: CPT

## 2023-01-05 PROCEDURE — 97530 THERAPEUTIC ACTIVITIES: CPT

## 2023-01-05 PROCEDURE — 97112 NEUROMUSCULAR REEDUCATION: CPT

## 2023-01-05 NOTE — PROGRESS NOTES
dPT DAILY TREATMENT NOTE     Patient Name: Rosemarie Simons  Date:2023  : 1975  [x]  Patient  Verified  Payor: BLUE CROSS / Plan:  [facility] [place] / Product Type: PPO /    In time:11:04  Out time: 12:30  Total Treatment Time (min): 80  Visit #: 5  of     Medicare/BCBS Only   Total Timed Codes (min):66 1:1 Treatment Time: 56       Treatment Area: Left knee pain [M25.562]    SUBJECTIVE  Pain Level (0-10 scale): 0/10  Any medication changes, allergies to medications, adverse drug reactions, diagnosis change, or new procedure performed?: [x] No    [] Yes (see summary sheet for update)  Subjective functional status/changes:   [] No changes reported  I  went and saw the PA at [facility] and he said I was doing good and thought that the pain that I am feeling to the front of my knee cap is scar tissue when I try to kneel on my knees and since they went into my knee twice there is more scar tissue, swelling and lack of normal joint fluid so it will take a while to get better. He said that I can begin to work on return to running     OBJECTIVE    Modality rationale: decrease edema, decrease inflammation, and decrease pain to improve the patients ability to reduce pain with standing activities. Min Type Additional Details   15+5 set up [x] Estim:  [x]Unatt       [x]IFC  []Premod                        []Other:  [x]w/ice anterior   [x]w/heat posterior  Position: reclined   Location: L knee     [] Estim: []Att    []TENS instruct  []NMES                    []Other:  []w/US   []w/ice   []w/heat  Position:  Location:    []  Traction: [] Cervical       []Lumbar                       [] Prone          []Supine                       []Intermittent   [][]Continuous Lbs:   before manual  [] after manual   Held  [x]  Ultrasound: []Continuous   [x] Pulsed                           [x]1MHz   []3MHz W/cm2: 1.5 cont.    Location: posterior - knee medial aspect of popliteus origin,  and gastro    [] Iontophoresis with dexamethasone         Location: [] Take home patch   [] In clinic    []  Ice     []  heat  []  Ice massage  []  Laser   []  Anodyne Position:   Location:     []  Laser with stim  []  Other:  Position:  Location:    []  Vasopneumatic Device  Pre-treatment girth:   Post-treatment girth:   Measured at landmark location:  Pressure:       [] lo [] med [] hi   Temperature: [] lo [] med [] hi   [] Skin assessment post-treatment:  []intact []redness- no adverse reaction    []redness - adverse reaction:   Vasopnuematic compression justification:  Per bilateral girth measures taken and listed above the edema is considered significant and having an impact on the patient's strength, balance, gait, transfers, self care, and ADLs    15 min Therapeutic Exercise:  [x] See flow sheet :  Bike for ROM  Incline/HS stretch/quad stretch          Rationale: increase ROM and increase strength to improve the patients ability to improve gait mechanics     24 min Neuromuscular Re-education:  [x]  See flow sheet :  Jump squats  with shuffle backwards with black t-band around waist for resistance with forward jump  Jump squats on TG - level 26 - double leg   Vertical wall jumps with finger taps for 30 sec     TC:   Hip burners - hip abduction, CW and CCW circles for 30 sec each way     Rationale: increase ROM and increase strength  to improve the patients ability to improve functional transfers    12 min Therapeutic Activity:  [x]  See flow sheet :  Speed step ups  Wall slides   TC:  Functional DF stretch   Side lying (L) ankle eversion   Walking lunges - forward and reverse   Speed squats     Rationale: increase ROM and increase strength  to improve the patients ability to improve functional transfers       15 min Manual Therapy:    Passive stretching of popliteus  DTM and CFM to lateral and medial aspect of knee anterior and posterior and to the quads in prone and supine       The manual therapy interventions were performed at a separate and distinct time from the therapeutic activities interventions. Rationale: decrease pain, increase ROM, and increase tissue extensibility to improve stability to stair negotiation         With   [] TE   [] TA   [] neuro   [] other: Patient Education: [x] Review HEP    [] Progressed/Changed HEP based on:   [] positioning   [] body mechanics   [] transfers   [] heat/ice application    [] other:      Other Objective/Functional Measures: Added Jump squats on TG - level 26 - double leg   Vertical wall jumps with finger taps for 30 sec   For patient to begin return to run program     GOALS  Pt will be able to perform front/lateral line jumps for 30 seconds without pain to return to running. Initiated Vertical wall jumps with finger taps for 30 sec with some increase discomfort after approx 20 sec to anterior knee 1/5/2023    Pain Level (0-10 scale) post treatment: 0/10    ASSESSMENT/Changes in Function:   Patient's new order from MD to include \"progress to running program\" added above activities. Patient reports some anterior knee discomfort after approx 20 sec with vertical wall jumps with no pain or discomfort reported to the posterior aspect. Continues to present with tightness and trigger points to the popliteal and quads noted during manual    Patient will continue to benefit from skilled PT services to modify and progress therapeutic interventions, address functional mobility deficits, address ROM deficits, address strength deficits, analyze and address soft tissue restrictions, and address imbalance/dizziness to attain remaining goals. [x]  See Plan of Care  []  See progress note/recertification  []  See Discharge Summary         Progress towards goals / Updated goals:  1. Pt will be able to perform a SL squat without pain and good form to return to PLOF. 2.  Pt will be able to squat with 40# without pain in the L knee to return to work related activities.     3.  Pt will be able to hold a wall sit for 60 seconds as an indicator of improved functional quad strength/endurance. Patient was able to hold 30 sec wall sit with only slight discomfort 12/29/2022   4. Pt will be able to perform front/lateral line jumps for 30 seconds without pain to return to running.  Initiated Vertical wall jumps with finger taps for 30 sec with some increase discomfort after approx 20 sec to anterior knee 1/5/2023      PLAN  [x]  Upgrade activities as tolerated     [x]  Continue plan of care  []  Update interventions per flow sheet       []  Discharge due to:_  [x]  Other: progress to outside cone agility drills/cones with lateral shuffle and backward [facility] 1/5/2023  1155 AM    Future Appointments   Date Time Provider Ayaz Kelly   1/6/2023  9:30 AM Jessica Diaz DPT [facility]   1/10/2023  9:00 AM Jessica Diaz DPT [facility]   1/13/2023 11:00 AM Zeinab Rios PTA [facility]   1/13/2023  1:20 PM Louis Keller NP [facility]   1/17/2023  9:30 AM Jessica Diaz DPT [facility]   1/20/2023  9:30 AM Zeinab Rios PTA [facility]

## 2023-01-06 ENCOUNTER — HOSPITAL ENCOUNTER (OUTPATIENT)
Dept: PHYSICAL THERAPY | Age: 48
Discharge: HOME | End: 2023-01-06
Payer: COMMERCIAL

## 2023-01-06 PROCEDURE — 97110 THERAPEUTIC EXERCISES: CPT | Performed by: PHYSICAL THERAPIST

## 2023-01-06 PROCEDURE — 97112 NEUROMUSCULAR REEDUCATION: CPT | Performed by: PHYSICAL THERAPIST

## 2023-01-06 PROCEDURE — 97530 THERAPEUTIC ACTIVITIES: CPT | Performed by: PHYSICAL THERAPIST

## 2023-01-06 PROCEDURE — 97014 ELECTRIC STIMULATION THERAPY: CPT | Performed by: PHYSICAL THERAPIST

## 2023-01-06 PROCEDURE — 97140 MANUAL THERAPY 1/> REGIONS: CPT | Performed by: PHYSICAL THERAPIST

## 2023-01-06 NOTE — PROGRESS NOTES
dPT DAILY TREATMENT NOTE     Patient Name: Enmanuel Dumont  Date:2023  : 1975  [x]  Patient  Verified  Payor: BLUE CROSS / Plan: [address] [place] [address] [place] / Product Type: PPO /    In time:934  Out time: 1045  Total Treatment Time (min): 71  Visit #: 6 of     Medicare/BCBS Only   Total Timed Codes (min): 56 1:1 Treatment Time: 56       Treatment Area: Left knee pain [M25.562]    SUBJECTIVE  Pain Level (0-10 scale): 0/10  Any medication changes, allergies to medications, adverse drug reactions, diagnosis change, or new procedure performed?: [x] No    [] Yes (see summary sheet for update)  Subjective functional status/changes:   [] No changes reported  Pt reports that he felt okay after the plyometric activities yesterday but has anterior pinching sensation in the R knee. OBJECTIVE    Modality rationale: decrease edema, decrease inflammation, and decrease pain to improve the patients ability to reduce pain with standing activities. Min Type Additional Details   15 [x] Estim:  [x]Unatt       [x]IFC  []Premod                        []Other:  [x]w/ice anterior   [x]w/heat posterior  Position: reclined   Location: L knee     [] Estim: []Att    []TENS instruct  []NMES                    []Other:  []w/US   []w/ice   []w/heat  Position:  Location:    []  Traction: [] Cervical       []Lumbar                       [] Prone          []Supine                       []Intermittent   [][]Continuous Lbs:   before manual  [] after manual   Held  [x]  Ultrasound: []Continuous   [x] Pulsed                           [x]1MHz   []3MHz W/cm2: 1.5 cont.    Location: posterior - knee medial aspect of popliteus origin,  and gastro    []  Iontophoresis with dexamethasone         Location: [] Take home patch   [] In clinic    []  Ice     []  heat  []  Ice massage  []  Laser   []  Anodyne Position:   Location:     []  Laser with stim  []  Other:  Position:  Location:    []  Vasopneumatic Device  Pre-treatment girth: Post-treatment girth:   Measured at landmark location:  Pressure:       [] lo [] med [] hi   Temperature: [] lo [] med [] hi   [] Skin assessment post-treatment:  []intact []redness- no adverse reaction    []redness - adverse reaction:   Vasopnuematic compression justification:  Per bilateral girth measures taken and listed above the edema is considered significant and having an impact on the patient's strength, balance, gait, transfers, self care, and ADLs    14 min Therapeutic Exercise:  [x] See flow sheet :  Bike for ROM  Incline/HS stretch/quad stretch        Rationale: increase ROM and increase strength to improve the patients ability to improve gait mechanics     12 min Neuromuscular Re-education:  [x]  See flow sheet :  SL bridge with OH med ball on BOSU ball  Front/lateral planks   Rationale: increase ROM and increase strength  to improve the patients ability to improve functional transfers    20 min Therapeutic Activity:  [x]  See flow sheet :  Walking lunge with pause  Lateral lunges  Sumo squats   Rationale: increase ROM and increase strength  to improve the patients ability to improve functional transfers       10 min Manual Therapy:    XFM to the L quad/patellar tendon      The manual therapy interventions were performed at a separate and distinct time from the therapeutic activities interventions.   Rationale: decrease pain, increase ROM, and increase tissue extensibility to improve stability to stair negotiation         With   [] TE   [] TA   [] neuro   [] other: Patient Education: [x] Review HEP    [] Progressed/Changed HEP based on:   [] positioning   [] body mechanics   [] transfers   [] heat/ice application    [] other:      Other Objective/Functional Measures:   Increased pinching sensation in the L knee with push off for lateral lunges and sumo squats  Increased pain over the distal lateral patellar tendon    Pain Level (0-10 scale) post treatment: 0/10    ASSESSMENT/Changes in Function: Held jumping activities secondary to increased reports of anterior knee swelling from yesterday's session. Focused treatment on lateral movements and hip/core strengthening as well as SL balance activities to improve stability with return to running activities. Patient will continue to benefit from skilled PT services to modify and progress therapeutic interventions, address functional mobility deficits, address ROM deficits, address strength deficits, analyze and address soft tissue restrictions, and address imbalance/dizziness to attain remaining goals. [x]  See Plan of Care  []  See progress note/recertification  []  See Discharge Summary         Progress towards goals / Updated goals:  1. Pt will be able to perform a SL squat without pain and good form to return to PLOF. 2.  Pt will be able to squat with 40# without pain in the L knee to return to work related activities. 3.  Pt will be able to hold a wall sit for 60 seconds as an indicator of improved functional quad strength/endurance. Patient was able to hold 30 sec wall sit with only slight discomfort 12/29/2022   4. Pt will be able to perform front/lateral line jumps for 30 seconds without pain to return to running.  Initiated Vertical wall jumps with finger taps for 30 sec with some increase discomfort after approx 20 sec to anterior knee 1/5/2023      PLAN  [x]  Upgrade activities as tolerated     [x]  Continue plan of care  []  Update interventions per flow sheet       []  Discharge due to:_  [x]  Other: , check goals nv, continue to progress towards outside cone agility drills/cones with lateral shuffle and backward [address] Santiago DPT 1/6/2023  241 PM    Future Appointments   Date Time Provider Ayaz Kelly   1/10/2023  9:00 AM Kristen Vargas DPT [facility]   1/13/2023 11:00 AM [facility] [place] 1 [facility]   1/13/2023  1:20 PM María Francisco NP [facility] MARIE SCHED   1/17/2023  9:30 AM Kristen Vargas DPT [facility]   1/20/2023  9:30 AM [facility] PT [place] [facility]   1/24/2023  9:30 AM Anette Lions [facility]   1/27/2023  9:30 AM Emanuel Castillo PTA [facility]   1/31/2023  1:30 PM Anette Lions [facility]   2/3/2023  9:30 AM Emanuel Castillo PTA [facility]

## 2023-01-10 ENCOUNTER — HOSPITAL ENCOUNTER (OUTPATIENT)
Dept: PHYSICAL THERAPY | Age: 48
Discharge: HOME | End: 2023-01-10
Payer: COMMERCIAL

## 2023-01-10 PROCEDURE — 97530 THERAPEUTIC ACTIVITIES: CPT | Performed by: PHYSICAL THERAPIST

## 2023-01-10 PROCEDURE — 97110 THERAPEUTIC EXERCISES: CPT | Performed by: PHYSICAL THERAPIST

## 2023-01-10 PROCEDURE — 97014 ELECTRIC STIMULATION THERAPY: CPT | Performed by: PHYSICAL THERAPIST

## 2023-01-10 PROCEDURE — 97140 MANUAL THERAPY 1/> REGIONS: CPT | Performed by: PHYSICAL THERAPIST

## 2023-01-10 NOTE — PROGRESS NOTES
dPT DAILY TREATMENT NOTE     Patient Name: Hermelinda Brennan  Date:1/10/2023  : 1975  [x]  Patient  Verified  Payor: BLUE CROSS / Plan: [facility] [place] / Product Type: PPO /    In time:912  Out time: 5402  Total Treatment Time (min): 62  Visit #: 7 of     Medicare/BCBS Only   Total Timed Codes (min) 52 1:1 Treatment Time: 38       Treatment Area: Left knee pain [M25.562]    SUBJECTIVE  Pain Level (0-10 scale): .5/10  Any medication changes, allergies to medications, adverse drug reactions, diagnosis change, or new procedure performed?: [x] No    [] Yes (see summary sheet for update)  Subjective functional status/changes:   [] No changes reported  Pt note that he had increased discomfort in the anterior knee and posterior calf after doing a lot of stairs yesterday. OBJECTIVE    Modality rationale: decrease edema, decrease inflammation, and decrease pain to improve the patients ability to reduce pain with standing activities. Min Type Additional Details   10 [x] Estim:  [x]Unatt       [x]IFC  []Premod                        []Other:  [x]w/ice anterior   [x]w/heat posterior  Position: reclined   Location: L knee     [] Estim: []Att    []TENS instruct  []NMES                    []Other:  []w/US   []w/ice   []w/heat  Position:  Location:    []  Traction: [] Cervical       []Lumbar                       [] Prone          []Supine                       []Intermittent   [][]Continuous Lbs:   before manual  [] after manual   Held  [x]  Ultrasound: []Continuous   [x] Pulsed                           [x]1MHz   []3MHz W/cm2: 1.5 cont.    Location: posterior - knee medial aspect of popliteus origin,  and gastro    []  Iontophoresis with dexamethasone         Location: [] Take home patch   [] In clinic    []  Ice     []  heat  []  Ice massage  []  Laser   []  Anodyne Position:   Location:     []  Laser with stim  []  Other:  Position:  Location:    []  Vasopneumatic Device  Pre-treatment girth: Post-treatment girth:   Measured at landmark location:  Pressure:       [] lo [] med [] hi   Temperature: [] lo [] med [] hi   [] Skin assessment post-treatment:  []intact []redness- no adverse reaction    []redness - adverse reaction:   Vasopnuematic compression justification:  Per bilateral girth measures taken and listed above the edema is considered significant and having an impact on the patient's strength, balance, gait, transfers, self care, and ADLs    8 min Therapeutic Exercise:  [x] See flow sheet :  Bike for ROM  Dynamic warm-up quad stretch/hamstring stretch       Rationale: increase ROM and increase strength to improve the patients ability to improve gait mechanics     H min Neuromuscular Re-education:  [x]  See flow sheet :  SL bridge with OH med ball on BOSU ball  Front/lateral planks   Rationale: increase ROM and increase strength  to improve the patients ability to improve functional transfers    34 min Therapeutic Activity:  [x]  See flow sheet :  Walking lunge with pause  Lateral lunges  Sumo squats  Wall sits  Wall jumps   Rationale: increase ROM and increase strength  to improve the patients ability to improve functional transfers       10 min Manual Therapy:    XFM to the L quad/patellar tendon, DTM to the lateral quad      The manual therapy interventions were performed at a separate and distinct time from the therapeutic activities interventions.   Rationale: decrease pain, increase ROM, and increase tissue extensibility to improve stability to stair negotiation         With   [] TE   [] TA   [] neuro   [] other: Patient Education: [x] Review HEP    [] Progressed/Changed HEP based on:   [] positioning   [] body mechanics   [] transfers   [] heat/ice application    [] other:      Other Objective/Functional Measures:   Significant tenderness with XFM to the L lateral patellar tendon/DTM to the L lateral quad  Increased pain in the L knee with wall pushups  Increased pain with standing up erect post wall sits for 30 seconds    Pain Level (0-10 scale) post treatment: 0/10    ASSESSMENT/Changes in Function:   Pt continues to have pain in the L knee with plyometric and functional quad strengthening. He reports increased fatigue after increased activity levels from the day prior. Continue to progress as tolerated. Patient will continue to benefit from skilled PT services to modify and progress therapeutic interventions, address functional mobility deficits, address ROM deficits, address strength deficits, analyze and address soft tissue restrictions, and address imbalance/dizziness to attain remaining goals. [x]  See Plan of Care  []  See progress note/recertification  []  See Discharge Summary         Progress towards goals / Updated goals:  1. Pt will be able to perform a SL squat without pain and good form to return to PLOF. 2.  Pt will be able to squat with 40# without pain in the L knee to return to work related activities. 3.  Pt will be able to hold a wall sit for 60 seconds as an indicator of improved functional quad strength/endurance. Patient was able to hold 30 sec wall sit with with pain 1/10/23   4. Pt will be able to perform front/lateral line jumps for 30 seconds without pain to return to running.  Initiated Vertical wall jumps with finger taps for 30 sec with some increase discomfort after approx 20 sec to anterior knee 1/5/2023      PLAN  [x]  Upgrade activities as tolerated     [x]  Continue plan of care  []  Update interventions per flow sheet       []  Discharge due to:_  [x]  Other: , check goals nv for reassessment    Yaz Liriano DPT 1/10/2023  125 PM    Future Appointments   Date Time Provider Ayaz Kelly   1/13/2023 11:00 AM [address] 1 [facility]   1/13/2023  1:20 PM Lexy Kwan NP [facility] MARIE SCHED   1/17/2023  9:30 AM Сергей Ruiz DPT [facility]   1/20/2023  9:30 AM [facility] [place]RA[place] 1 [facility]   1/24/2023  9:30 AM Jeremy Perdue [facility]   1/27/2023  9:30 AM Krystin Feng PTA [facility]   1/31/2023  1:30 PM Anna Chunmings [facility]   2/3/2023  9:30 AM Krystin Feng PTA [facility]

## 2023-01-11 ENCOUNTER — APPOINTMENT (OUTPATIENT)
Dept: PHYSICAL THERAPY | Age: 48
End: 2023-01-11
Payer: COMMERCIAL

## 2023-01-13 ENCOUNTER — HOSPITAL ENCOUNTER (OUTPATIENT)
Dept: PHYSICAL THERAPY | Age: 48
Discharge: HOME | End: 2023-01-13
Payer: COMMERCIAL

## 2023-01-13 PROCEDURE — 97530 THERAPEUTIC ACTIVITIES: CPT

## 2023-01-13 PROCEDURE — 97140 MANUAL THERAPY 1/> REGIONS: CPT

## 2023-01-13 PROCEDURE — 97112 NEUROMUSCULAR REEDUCATION: CPT

## 2023-01-13 PROCEDURE — 97110 THERAPEUTIC EXERCISES: CPT

## 2023-01-13 PROCEDURE — 97014 ELECTRIC STIMULATION THERAPY: CPT

## 2023-01-13 NOTE — PROGRESS NOTES
PT DAILY TREATMENT NOTE     Patient Name: Amanda Escalona  Date:2023  : 1975  [x]  Patient  Verified  Payor: BLUE CROSS / Plan: [facility] / Product Type: PPO /    In time: 10:32 Out time: 11:49  Total Treatment Time (min): 68'  Visit #: 8 of     Medicare/BCBS Only   Total Timed Codes (min) 65'  1:1 Treatment Time: 10:44-11:37 (48')        Treatment Area: Left knee pain [M25.562]    SUBJECTIVE  Pain Level (0-10 scale): 0 /10  Any medication changes, allergies to medications, adverse drug reactions, diagnosis change, or new procedure performed?: [x] No    [] Yes (see summary sheet for update)  Subjective functional status/changes:   [] No changes reported      OBJECTIVE    Modality rationale: decrease edema, decrease inflammation, and decrease pain to improve the patients ability to reduce pain with standing activities. Min Type Additional Details   12' set up  [x] Estim:  [x]Unatt       []IFC  [x]Premod                        []Other:  [x]w/ice anterior & posterior   []w/heat posterior  Position:prone   Location: L knee plantaris and popliteus    [] Estim: []Att    []TENS instruct  []NMES                    []Other:  []w/US   []w/ice   []w/heat  Position:  Location:    []  Traction: [] Cervical       []Lumbar                       [] Prone          []Supine                       []Intermittent   [][]Continuous Lbs:   before manual  [] after manual   Held  [x]  Ultrasound: []Continuous   [x] Pulsed                           [x]1MHz   []3MHz W/cm2: 1.5 cont.    Location: posterior - knee medial aspect of popliteus origin,  and gastro    []  Iontophoresis with dexamethasone         Location: [] Take home patch   [] In clinic    []  Ice     []  heat  []  Ice massage  []  Laser   []  Anodyne Position:   Location:     []  Laser with stim  []  Other:  Position:  Location:    []  Vasopneumatic Device  Pre-treatment girth:   Post-treatment girth:   Measured at landmark location: Pressure:       [] lo [] med [] hi   Temperature: [] lo [] med [] hi   [] Skin assessment post-treatment:  []intact []redness- no adverse reaction    []redness - adverse reaction:   Vasopnuematic compression justification:  Per bilateral girth measures taken and listed above the edema is considered significant and having an impact on the patient's strength, balance, gait, transfers, self care, and ADLs    15  min Therapeutic Exercise:  [x] See flow sheet :  Bike for ROM  Dynamic warm-up quad stretch/hamstring stretch       Rationale: increase ROM and increase strength to improve the patients ability to improve gait mechanics     15 min Neuromuscular Re-education:  [x]  See flow sheet :  SL bridge with OH med ball on BOSU ball  Front/lateral planks   Rationale: increase ROM and increase strength  to improve the patients ability to improve functional transfers    25   (13 1:1) min Therapeutic Activity:  [x]  See flow sheet :  Walking lunge with pause  Lateral lunges  Sumo squats  Wall sits  Wall jumps   Rationale: increase ROM and increase strength  to improve the patients ability to improve functional transfers       10  min Manual Therapy:    STM / DTM to plantaris and popliteus of L knee, Pt in prone      The manual therapy interventions were performed at a separate and distinct time from the therapeutic activities interventions.   Rationale: decrease pain, increase ROM, and increase tissue extensibility to improve stability to stair negotiation         With   [] TE   [] TA   [] neuro   [] other: Patient Education: [x] Review HEP    [] Progressed/Changed HEP based on:   [] positioning   [] body mechanics   [] transfers   [] heat/ice application    [] other:      Other Objective/Functional Measures:     Significant tenderness manual therapy  Increased pain with standing up erect post wall sits for 30 seconds  Pain increase in late set during retro step down and lateral lunge    Pain Level (0-10 scale) post treatment: 0/10    ASSESSMENT/Changes in Function:   Pt has no c/o pain during holding wall sit, does have c/o pain while returning to standing. Pt has c/o pain in L knee during SL squat, but it is possible squat mechanics were not correct, check at NV for PN. Pt continues to reports L knee pain when returning to standing position in wall squat, check knee/toe relation at NV. PN due NV     Patient will continue to benefit from skilled PT services to modify and progress therapeutic interventions, address functional mobility deficits, address ROM deficits, address strength deficits, analyze and address soft tissue restrictions, and address imbalance/dizziness to attain remaining goals. [x]  See Plan of Care  []  See progress note/recertification  []  See Discharge Summary         Progress towards goals / Updated goals:  1. Pt will be able to perform a SL squat without pain and good form to return to PLOF. Current: pain after third SL squat, form varies, test NV (1/13/23)   2. Pt will be able to squat with 40# without pain in the L knee to return to work related activities. Current:    3. Pt will be able to hold a wall sit for 60 seconds as an indicator of improved functional quad strength/endurance. Patient was able to hold 30 sec wall sit with with pain 1/10/23  Current: 1/13/23 - no pain with squat hold, pain when returning to stand   4. Pt will be able to perform front/lateral line jumps for 30 seconds without pain to return to running.  Initiated Vertical wall jumps with finger taps for 30 sec with some increase discomfort after approx 20 sec to anterior knee 1/5/2023  Current:       PLAN  [x]  Upgrade activities as tolerated     [x]  Continue plan of care  []  Update interventions per flow sheet       []  Discharge due to:_  []  Other:     Hannah Bedolla, [facility] 1/13/2023  125 PM    Future Appointments   Date Time Provider Aayz Kelly   1/13/2023 11:00 AM [address] 1 [facility]   1/13/2023 1:20 PM Jadiel Auguste NP [facility] MARIE SCHED   1/17/2023  9:30 AM Yaritza Friday, DPT [facility]   1/20/2023  9:30 AM [facility]   1/24/2023  9:30 AM Mejia Patel PTA [facility]   1/27/2023  9:30 AM Mejia Patel PTA [facility]   1/31/2023  1:30 [facility]   2/3/2023  9:30 AM Mejia Patel PTA [facility]

## 2023-01-17 ENCOUNTER — HOSPITAL ENCOUNTER (OUTPATIENT)
Dept: PHYSICAL THERAPY | Age: 48
Discharge: HOME | End: 2023-01-17
Payer: COMMERCIAL

## 2023-01-17 PROCEDURE — 97530 THERAPEUTIC ACTIVITIES: CPT | Performed by: PHYSICAL THERAPIST

## 2023-01-17 PROCEDURE — 97014 ELECTRIC STIMULATION THERAPY: CPT | Performed by: PHYSICAL THERAPIST

## 2023-01-17 NOTE — PROGRESS NOTES
[address] [facility] AT [facility]  [address] [address],[address], [place] jen[place], [place]  Phone [phone]  Fax [phone]  PROGRESS NOTE  Patient Name: Sandie Jarrett : 1975   Treatment/Medical Diagnosis: Left knee pain [M25.562]   Referral Source: Cate Cancino MD     Date of Initial Visit: 10/18/22 Attended Visits: 23 Missed Visits: 2     SUMMARY OF TREATMENT  Pt seen for 23 PT visits sp L menisectomy on 10/6/22. CURRENT STATUS  Pt reports 90% improvement since [facility]. He reports improvements in overall stability, strength, ability to bear weight through the L knee. He reports that he was able to walk and pivot on the L knee and it did not hurt. His only limitation is running. Upon reassessment, pt was able to perform all functional quad exercises with 40# of gear with slight increased L knee pain and muscle quivering. He continues to have pain in the L knee with added weight- and difficulty with running secondary to impact however, his pain and ability to perform are improving as he was able to tolerate all activities this session with 40# added work vest to progress towards return to full duty. Pt would benefit from a course of skilled PT to further improve functional knee strength to return to work/running symptom free. Other Objective/Functional Measures:   Performed all exercises with his [employer] gear:  -Increased pain in the L anterior knee with 30 sec wall jump with gear for 30 seconds to 2-3/10  -Wall sit for 60 seconds with increased muscle quivering  -Quick steps for 30 seconds- slight discomfort in the anterior/posterior knee  -able to hold a front plank for 60 seconds without difficulty  He was able to squat with gear 10 reps without pain in the L knee  FOTO improved to 75/100 from 65/100 at last assessment    Other Objective/Functional Measures: taken on 22  Front/back jumps: not equal weight through B LE and not consistent jumps.  Hesitation noted due to instability. Side to side jumps: 25 seconds before onset of pain  Wall sit: 52 seconds before onset of pain 1/10   Able to perform 1/4 SL squat. He was able to perform 3 or 4 before onset of pain. Goal/Measure of Progress Goal Met? 1. Pt will be able to perform a SL squat without pain and good form to return to PLOF. Current:  NT secondary to fatigue will continue goal 1/17/23   2. Pt will be able to squat with 40# without pain in the L knee to return to work related activities. Current: goal met 1/17/23   3. Pt will be able to hold a wall sit for 60 seconds as an indicator of improved functional quad strength/endurance. Current: pt is able to perform 60\" with 40# with 2-3/10 pain in the L knee 1/17/23   4. Pt will be able to perform front/lateral line jumps for 30 seconds without pain to return to running. Able to perform with 40# with 2-3/10 pain 1/17/23         New Goals to be achieved in __4__  weeks:  1. Pt will be able to perform a SL squat without pain and good form to return to PLOF. 2.  Pt will be able to hold a wall sit for 60 seconds as an indicator of improved functional quad strength/endurance. 3.  Pt will be able to perform front/lateral line jumps for 30 seconds without pain to return to running. 4. Pt will be able to run for 30 seconds/walk for 1 min for a total 10 min with no more than 2/10 pain in the L knee to return to work. RECOMMENDATIONS  Continue PT 2x/week for 2 weeks and then 1x/week for 2 weeks to progress functional LE strength and return to running symptom free. If you have any questions/comments please contact us directly at [phone]. Thank you for allowing us to assist in the care of your patient. Therapist Signature: Katie Sampson DPT Date: 1/17/2023     Time: 10:19 AM   NOTE TO PHYSICIAN:  PLEASE COMPLETE THE ORDERS BELOW AND FAX TO   [facility]: [phone].   If you are unable to process this request in 24 hours please contact our office: [phone].    ___ I have read the above report and request that my patient continue as recommended.   ___ I have read the above report and request that my patient continue therapy with the following changes/special instructions:_________________________________________________________   ___ I have read the above report and request that my patient be discharged from therapy.      Physician Signature:        Date:       Time:    Bradley Peck MD

## 2023-01-17 NOTE — PROGRESS NOTES
PT DAILY TREATMENT NOTE     Patient Name: Naina Dewey  Date:2023  : 1975  [x]  Patient  Verified  Payor: BLUE CROSS / [facility] / Product Type: PPO /    In time: 933 Out time: 1026  Total Treatment Time (min): 53  Visit #: 9 of     Medicare/BCBS Only   Total Timed Codes (min) 38  1:1 Treatment Time: 30       Treatment Area: Left knee pain [M25.562]    SUBJECTIVE  Pain Level (0-10 scale): 0 /10  Any medication changes, allergies to medications, adverse drug reactions, diagnosis change, or new procedure performed?: [x] No    [] Yes (see summary sheet for update)  Subjective functional status/changes:   [] No changes reported  Pt reports 90% improvement since [facility]. He reports improvements in overall stability, strength, ability to bear weight through the L knee. He reports that he was able to walk and pivot on the L knee and it did not hurt. His only limitation is running. OBJECTIVE    Modality rationale: decrease edema, decrease inflammation, and decrease pain to improve the patients ability to reduce pain with standing activities. Min Type Additional Details   15 [x] Estim:  [x]Unatt       [x]IFC  [x]Premod                        []Other:  [x]w/ice anterior & posterior   []w/heat posterior  Position:prone   Location: L knee plantaris and popliteus    [] Estim: []Att    []TENS instruct  []NMES                    []Other:  []w/US   []w/ice   []w/heat  Position:  Location:    []  Traction: [] Cervical       []Lumbar                       [] Prone          []Supine                       []Intermittent   [][]Continuous Lbs:   before manual  [] after manual   Held  [x]  Ultrasound: []Continuous   [x] Pulsed                           [x]1MHz   []3MHz W/cm2: 1.5 cont.    Location: posterior - knee medial aspect of popliteus origin,  and gastro    []  Iontophoresis with dexamethasone         Location: [] Take home patch   [] In clinic    []  Ice     []  heat  []  Ice massage  []  Laser   []  Anodyne Position:   Location:     []  Laser with stim  []  Other:  Position:  Location:    []  Vasopneumatic Device  Pre-treatment girth:   Post-treatment girth:   Measured at landmark location:  Pressure:       [] lo [] med [] hi   Temperature: [] lo [] med [] hi   [] Skin assessment post-treatment:  []intact []redness- no adverse reaction    []redness - adverse reaction:   Vasopnuematic compression justification:  Per bilateral girth measures taken and listed above the edema is considered significant and having an impact on the patient's strength, balance, gait, transfers, self care, and ADLs    8 min Therapeutic Exercise:  [x] See flow sheet :  Bike for ROM  Dynamic warm-up quad stretch/hamstring stretch       Rationale: increase ROM and increase strength to improve the patients ability to improve gait mechanics     H min Neuromuscular Re-education:  [x]  See flow sheet :     Rationale: increase ROM and increase strength  to improve the patients ability to improve functional transfers    30 min Therapeutic Activity:  [x]  See flow sheet :  Walking lunge with pause  Lateral lunges  Wall sit  Wall jumps  PT reassessment  Quick steps  Front plank   Rationale: increase ROM and increase strength  to improve the patients ability to improve functional transfers       H  min Manual Therapy:    STM / DTM to plantaris and popliteus of L knee, Pt in prone      The manual therapy interventions were performed at a separate and distinct time from the therapeutic activities interventions.   Rationale: decrease pain, increase ROM, and increase tissue extensibility to improve stability to stair negotiation         With   [] TE   [] TA   [] neuro   [] other: Patient Education: [x] Review HEP    [] Progressed/Changed HEP based on:   [] positioning   [] body mechanics   [] transfers   [] heat/ice application    [] other:      Other Objective/Functional Measures:   Performed all exercises with his [employer] gear:  -Increased pain in the L anterior knee with 30 sec wall jump with gear for 30 seconds to 2-3/10  -Wall sit for 60 seconds with increased muscle quivering  -Quick steps for 30 seconds- slight discomfort in the anterior/posterior knee  -able to hold a front plank for 60 seconds without difficulty  He was able to squat with gear 10 reps without pain in the L knee  FOTO improved to 75/100 from 65/100 at last assessment      Pain Level (0-10 scale) post treatment: 0/10    ASSESSMENT/Changes in Function:   Upon reassessment, pt was able to perform all functional quad exercises with 40# of gear with slight increased L knee pain and muscle quivering. He continues to have pain in the L knee with added weight- and difficulty with running secondary to impact however, his pain and ability to perform are improving as he was able to tolerate all activities this session with 40# added work vest to progress towards return to full duty. Pt would benefit from a course of skilled PT to further improve functional knee strength to return to work/running symptom free. Patient will continue to benefit from skilled PT services to modify and progress therapeutic interventions, address functional mobility deficits, address ROM deficits, address strength deficits, analyze and address soft tissue restrictions, and address imbalance/dizziness to attain remaining goals. [x]  See Plan of Care  []  See progress note/recertification  []  See Discharge Summary         Progress towards goals / Updated goals:  1. Pt will be able to perform a SL squat without pain and good form to return to PLOF. Current:  NT secondary to fatigue will continue goal 1/17/23   2. Pt will be able to squat with 40# without pain in the L knee to return to work related activities. Current: goal met 1/17/23   3. Pt will be able to hold a wall sit for 60 seconds as an indicator of improved functional quad strength/endurance.    Current: pt is able to perform 60\" with 40# with 2-3/10 pain in the L knee 1/17/23   4. Pt will be able to perform front/lateral line jumps for 30 seconds without pain to return to running.  Able to perform with 40# with 2-3/10 pain 1/17/23      PLAN  [x]  Upgrade activities as tolerated     [x]  Continue plan of care  []  Update interventions per flow sheet       []  Discharge due to:_  [x]  Other: Continue PT 2x/week for 2 weeks and then 1x/week for 2 weeks to progress functional LE strength and return to running symptom free     Silva Jackman DPT 1/17/2023  1040 AM    Future Appointments   Date Time Provider Ayaz Kelly   1/20/2023  9:30 AM [facility] [place] [facility]   1/24/2023  9:30 AM Eduard Garsia PTA [facility]   1/27/2023  9:30 AM Eduard Garsia PTA [facility]   1/31/2023  1:30 PM Eduard Garsia PTA [facility]   2/3/2023  9:30 AM Eduard Garsia PTA [facility]   6/13/2023  2:00 PM KHANH SANTILLAN   6/13/2023  2:45 PM Ez Owens

## 2023-01-20 ENCOUNTER — HOSPITAL ENCOUNTER (OUTPATIENT)
Dept: PHYSICAL THERAPY | Age: 48
Discharge: HOME | End: 2023-01-20
Payer: COMMERCIAL

## 2023-01-20 PROCEDURE — 97110 THERAPEUTIC EXERCISES: CPT

## 2023-01-20 PROCEDURE — 97530 THERAPEUTIC ACTIVITIES: CPT

## 2023-01-20 PROCEDURE — 97032 APPL MODALITY 1+ESTIM EA 15: CPT

## 2023-01-24 ENCOUNTER — HOSPITAL ENCOUNTER (OUTPATIENT)
Dept: PHYSICAL THERAPY | Age: 48
Discharge: HOME | End: 2023-01-24
Payer: COMMERCIAL

## 2023-01-24 PROCEDURE — 97014 ELECTRIC STIMULATION THERAPY: CPT | Performed by: PHYSICAL THERAPIST

## 2023-01-24 PROCEDURE — 97112 NEUROMUSCULAR REEDUCATION: CPT | Performed by: PHYSICAL THERAPIST

## 2023-01-24 PROCEDURE — 97530 THERAPEUTIC ACTIVITIES: CPT | Performed by: PHYSICAL THERAPIST

## 2023-01-24 NOTE — PROGRESS NOTES
PT DAILY TREATMENT NOTE     Patient Name: Janie Camacho  Date:2023  : 1975  [x]  Patient  Verified  Payor: BLUE CROSS / Plan: [employer] [facility] / Product Type: PPO /    In time: 9:30 Out time: 1042  Total Treatment Time (min): 72   Visit #: 2 of 6    Medicare/BCBS Only   Total Timed Codes (min) 57  1:1 Treatment Time: 27       Treatment Area: Left knee pain [M25.562]    SUBJECTIVE  Pain Level (0-10 scale): 0 /10  Any medication changes, allergies to medications, adverse drug reactions, diagnosis change, or new procedure performed?: [x] No    [] Yes (see summary sheet for update)  Subjective functional status/changes:   [] No changes reported  The patient reports he had to do four 1 hour yoga sessions at work and is very sore, especially from kneeling positions. OBJECTIVE    Modality rationale: decrease edema, decrease inflammation, and decrease pain to improve the patients ability to reduce pain with standing activities. Min Type Additional Details   15 [x] Estim:  [x]Unatt       [x]IFC  [x]Premod                        []Other:  [x]w/ice anterior & posterior   []w/heat posterior  Position:prone   Location: L knee plantaris and popliteus    [] Estim: []Att    []TENS instruct  []NMES                    []Other:  []w/US   []w/ice   []w/heat  Position:  Location:    []  Traction: [] Cervical       []Lumbar                       [] Prone          []Supine                       []Intermittent   [][]Continuous Lbs:   before manual  [] after manual   Held  [x]  Ultrasound: []Continuous   [x] Pulsed                           [x]1MHz   []3MHz W/cm2: 1.5 cont.    Location: posterior - knee medial aspect of popliteus origin,  and gastro    []  Iontophoresis with dexamethasone         Location: [] Take home patch   [] In clinic    []  Ice     []  heat  []  Ice massage  []  Laser   []  Anodyne Position:   Location:     []  Laser with stim  []  Other:  Position:  Location:    []  Vasopneumatic Device  Pre-treatment girth:   Post-treatment girth:   Measured at landmark location:  Pressure:       [] lo [] med [] hi   Temperature: [] lo [] med [] hi   [] Skin assessment post-treatment:  []intact []redness- no adverse reaction    []redness - adverse reaction:   Vasopnuematic compression justification:  Per bilateral girth measures taken and listed above the edema is considered significant and having an impact on the patient's strength, balance, gait, transfers, self care, and ADLs    8 min Therapeutic Exercise:  [x] See flow sheet :  Bike for ROM  Dynamic warm-up quad stretch/hamstring stretch       Rationale: increase ROM and increase strength to improve the patients ability to improve gait mechanics     10 min Neuromuscular Re-education:  [x]  See flow sheet :  Eccentric landing on the L knee from 6\" step  SL jump on trampoline   Rationale: increase ROM and increase strength  to improve the patients ability to improve functional transfers    39 min Therapeutic Activity:  [x]  See flow sheet :  Walking lunge  Lateral lunges  Wall sits  Quick steps  High skips to prepare for running  Sumo squats         Rationale: increase ROM and increase strength  to improve the patients ability to improve functional transfers       H  min Manual Therapy:    STM / DTM to plantaris and popliteus of L knee, Pt in prone      The manual therapy interventions were performed at a separate and distinct time from the therapeutic activities interventions.   Rationale: decrease pain, increase ROM, and increase tissue extensibility to improve stability to stair negotiation         With   [] TE   [] TA   [] neuro   [] other: Patient Education: [x] Review HEP    [] Progressed/Changed HEP based on:   [] positioning   [] body mechanics   [] transfers   [] heat/ice application    [] other:      Other Objective/Functional Measures:   Pt was able to perform SL jumps on trampoline as well as high skipping without increased pain in the L knee      Pain Level (0-10 scale) post treatment: 0/10    ASSESSMENT/Changes in Function:   Pt tolerated all therex without increased pain in the L knee. Pt has reduced confidence in the L knee and therefore, causing some hesitancy with added skipping and SL jumping. Once he realized that he could do it without pain he was able to perform with improved form. Continue to progress as tolerated. Progress to running next visit. Patient will continue to benefit from skilled PT services to modify and progress therapeutic interventions, address functional mobility deficits, address ROM deficits, address strength deficits, analyze and address soft tissue restrictions, and address imbalance/dizziness to attain remaining goals. [x]  See Plan of Care  []  See progress note/recertification  []  See Discharge Summary         Progress towards goals / Updated goals:  New Goals to be achieved in __4__  weeks:  1. Pt will be able to perform a SL squat without pain and good form to return to PLOF. 2.  Pt will be able to hold a wall sit for 60 seconds as an indicator of improved functional quad strength/endurance. 3.  Pt will be able to perform front/lateral line jumps for 30 seconds without pain to return to running. Able to perform high skips and SL tramp jumps without pain 1/24/23   4. Pt will be able to run for 30 seconds/walk for 1 min for a total 10 min with no more than 2/10 pain in the L knee to return to work.         PLAN  [x]  Upgrade activities as tolerated     [x]  Continue plan of care  []  Update interventions per flow sheet       []  Discharge due to:_  [x]  Other: progress as tolerated nv, trial running [facility], T 1/24/2023  1040 AM    Future Appointments   Date Time Provider Ayaz Kelly   1/27/2023  9:30 AM [facility]   2/3/2023  9:30 AM [facility]   2/7/2023  9:30 AM [facility]   6/13/2023  2:00 PM [facility]   6/13/2023  2:45 PM Ez Aguilera 27

## 2023-01-27 ENCOUNTER — HOSPITAL ENCOUNTER (OUTPATIENT)
Dept: PHYSICAL THERAPY | Age: 48
Discharge: HOME | End: 2023-01-27
Payer: COMMERCIAL

## 2023-01-27 PROCEDURE — 97112 NEUROMUSCULAR REEDUCATION: CPT

## 2023-01-27 PROCEDURE — 97014 ELECTRIC STIMULATION THERAPY: CPT

## 2023-01-27 PROCEDURE — 97530 THERAPEUTIC ACTIVITIES: CPT

## 2023-01-27 NOTE — PROGRESS NOTES
PT DAILY TREATMENT NOTE     Patient Name: Mouna Conway  Date:2023  : 1975  [x]  Patient  Verified  Payor: BLUE CROSS / Plan: [address] [place] [address] [place] / Product Type: PPO /    In time: 9:30 Out time: 10:30  Total Treatment Time (min):  60  Visit #: 3 of 6    Medicare/BCBS Only   Total Timed Codes (min) 45  1:1 Treatment Time: 30       Treatment Area: Left knee pain [M25.562]    SUBJECTIVE  Pain Level (0-10 scale): 0 /10  Any medication changes, allergies to medications, adverse drug reactions, diagnosis change, or new procedure performed?: [x] No    [] Yes (see summary sheet for update)  Subjective functional status/changes:   [] No changes reported  I am curious to see shepard my knee feels today when we try the run on the treadmill. OBJECTIVE    Modality rationale: decrease edema, decrease inflammation, and decrease pain to improve the patients ability to reduce pain with standing activities. Min Type Additional Details   15 [x] Estim:  [x]Unatt       [x]IFC  [x]Premod                        []Other:  [x]w/ice anterior & posterior   []w/heat posterior  Position:prone   Location: L knee plantaris and popliteus    [] Estim: []Att    []TENS instruct  []NMES                    []Other:  []w/US   []w/ice   []w/heat  Position:  Location:    []  Traction: [] Cervical       []Lumbar                       [] Prone          []Supine                       []Intermittent   [][]Continuous Lbs:   before manual  [] after manual   Held  [x]  Ultrasound: []Continuous   [x] Pulsed                           [x]1MHz   []3MHz W/cm2: 1.5 cont.    Location: posterior - knee medial aspect of popliteus origin,  and gastro    []  Iontophoresis with dexamethasone         Location: [] Take home patch   [] In clinic    []  Ice     []  heat  []  Ice massage  []  Laser   []  Anodyne Position:   Location:     []  Laser with stim  []  Other:  Position:  Location:    []  Vasopneumatic Device  Pre-treatment girth: Post-treatment girth:   Measured at landmark location:  Pressure:       [] lo [] med [] hi   Temperature: [] lo [] med [] hi   [] Skin assessment post-treatment:  []intact []redness- no adverse reaction    []redness - adverse reaction:   Vasopnuematic compression justification:  Per bilateral girth measures taken and listed above the edema is considered significant and having an impact on the patient's strength, balance, gait, transfers, self care, and ADLs    10 min Therapeutic Exercise:  [x] See flow sheet :  Bike for ROM  Dynamic warm-up quad stretch/hamstring stretch       Rationale: increase ROM and increase strength to improve the patients ability to improve gait mechanics     15 min Neuromuscular Re-education:  [x]  See flow sheet :  Eccentric landing on the L knee from 6\" step  Run walk on TM with therapist controlling speed - 30 sec on and 1 min walk recovery  Wall jumps   Ladder drill   Rationale: increase ROM and increase strength  to improve the patients ability to improve functional transfers    20 min Therapeutic Activity:  [x]  See flow sheet :  Walking lunge  Lateral lunges  Wall sits  High skips to prepare for running  Sumo squats         Rationale: increase ROM and increase strength  to improve the patients ability to improve functional transfers       H  min Manual Therapy:    STM / DTM to plantaris and popliteus of L knee, Pt in prone      The manual therapy interventions were performed at a separate and distinct time from the therapeutic activities interventions.   Rationale: decrease pain, increase ROM, and increase tissue extensibility to improve stability to stair negotiation         With   [] TE   [] TA   [] neuro   [] other: Patient Education: [x] Review HEP    [] Progressed/Changed HEP based on:   [] positioning   [] body mechanics   [] transfers   [] heat/ice application    [] other:      Other Objective/Functional Measures:   Changed quick steps to ladder drills  Increase sumo squats to #30   Added DL with #25 plate  Added running on TM - with patient running at 4.5 mph to 5.5 mph for 30 sec and then walking for 1 minute      Pain Level (0-10 scale) post treatment: 0/10    ASSESSMENT/Changes in Function:   Initiated  running on TM - with patient running at 4.5 mph to 5.5 mph for 30 sec and then walking for 1 minute - patient completed 3 rounds. Patient required verbal cuing to increase stride length and to perform heel to pattern secondary to patient was initial landing on toes. Patient presents with decrease endurance with running and would beginning experiencing some increase knee discomfort at the 30 sec misty. Patient was able to eccentric landing onto the (L) knee with less knee pain. Attempted to increase lateral squats to #30 however unable to maintain correct  with pushing off with (L) leg/foot, decrease weight back to #20 with improved form     Patient will continue to benefit from skilled PT services to modify and progress therapeutic interventions, address functional mobility deficits, address ROM deficits, address strength deficits, analyze and address soft tissue restrictions, and address imbalance/dizziness to attain remaining goals. [x]  See Plan of Care  []  See progress note/recertification  []  See Discharge Summary         Progress towards goals / Updated goals:  New Goals to be achieved in __4__  weeks:  1. Pt will be able to perform a SL squat without pain and good form to return to PLOF. 2.  Pt will be able to hold a wall sit for 60 seconds as an indicator of improved functional quad strength/endurance. 3.  Pt will be able to perform front/lateral line jumps for 30 seconds without pain to return to running. Able to perform high skips and SL tramp jumps without pain 1/24/23   4. Pt will be able to run for 30 seconds/walk for 1 min for a total 10 min with no more than 2/10 pain in the L knee to return to work.         PLAN  [x]  Upgrade activities as tolerated     [x]  Continue plan of care  []  Update interventions per flow sheet       []  Discharge due to:_  []  Other:   Kleber Beckman PTA 1/27/2023  1040 AM    Future Appointments   Date Time Provider Ayaz Kelly   2/3/2023  9:30 AM Aayush Vizcaino [facility]   2/7/2023  9:30 AM Clara Arreguin PTA [facility]   6/13/2023  2:00 PM KHANH Vicente [address]   6/13/2023  2:45 PM Ez Kothari [address]

## 2023-01-31 ENCOUNTER — APPOINTMENT (OUTPATIENT)
Dept: PHYSICAL THERAPY | Age: 48
End: 2023-01-31
Payer: COMMERCIAL

## 2023-02-03 ENCOUNTER — APPOINTMENT (OUTPATIENT)
Dept: PHYSICAL THERAPY | Age: 48
End: 2023-02-03
Payer: COMMERCIAL

## 2023-02-07 ENCOUNTER — HOSPITAL ENCOUNTER (OUTPATIENT)
Dept: PHYSICAL THERAPY | Age: 48
Discharge: HOME | End: 2023-02-07
Payer: COMMERCIAL

## 2023-02-07 PROCEDURE — 97112 NEUROMUSCULAR REEDUCATION: CPT | Performed by: PHYSICAL THERAPIST

## 2023-02-07 PROCEDURE — 97530 THERAPEUTIC ACTIVITIES: CPT | Performed by: PHYSICAL THERAPIST

## 2023-02-07 PROCEDURE — 97014 ELECTRIC STIMULATION THERAPY: CPT | Performed by: PHYSICAL THERAPIST

## 2023-02-07 PROCEDURE — 97140 MANUAL THERAPY 1/> REGIONS: CPT | Performed by: PHYSICAL THERAPIST

## 2023-02-07 NOTE — PROGRESS NOTES
PT DAILY TREATMENT NOTE     Patient Name: Margrette Litten  Date:2023  : 1975  [x]  Patient  Verified  Payor: BLUE CROSS / Plan: [employer] [facility] [place] / Product Type: PPO /    In time: 9:31 Out time: 10:49  Total Treatment Time (min):  78  Visit #: 4 of 6    Medicare/BCBS Only   Total Timed Codes (min) 63 1:1 Treatment Time: 40       Treatment Area: Left knee pain [M25.562]    SUBJECTIVE  Pain Level (0-10 scale): 0 /10  Any medication changes, allergies to medications, adverse drug reactions, diagnosis change, or new procedure performed?: [x] No    [] Yes (see summary sheet for update)  Subjective functional status/changes:   [] No changes reported  If I do an usual step or a quick step with a rotation type of movement, I can feel that to the inside of the knee like the inside rotation is off, like it is not level on the inside      OBJECTIVE    Modality rationale: decrease edema, decrease inflammation, and decrease pain to improve the patients ability to reduce pain with standing activities. Min Type Additional Details   15 [x] Estim:  [x]Unatt       [x]IFC  [x]Premod                        []Other:  [x]w/ice anterior & posterior   []w/heat posterior  Position:prone   Location: L knee plantaris and popliteus    [] Estim: []Att    []TENS instruct  []NMES                    []Other:  []w/US   []w/ice   []w/heat  Position:  Location:    []  Traction: [] Cervical       []Lumbar                       [] Prone          []Supine                       []Intermittent   [][]Continuous Lbs:   before manual  [] after manual   Held  [x]  Ultrasound: []Continuous   [x] Pulsed                           [x]1MHz   []3MHz W/cm2: 1.5 cont.    Location: posterior - knee medial aspect of popliteus origin,  and gastro    []  Iontophoresis with dexamethasone         Location: [] Take home patch   [] In clinic    []  Ice     []  heat  []  Ice massage  []  Laser   []  Anodyne Position:   Location:     []  Laser with stim  []  Other:  Position:  Location:    []  Vasopneumatic Device  Pre-treatment girth:   Post-treatment girth:   Measured at landmark location:  Pressure:       [] lo [] med [] hi   Temperature: [] lo [] med [] hi   [] Skin assessment post-treatment:  []intact []redness- no adverse reaction    []redness - adverse reaction:   Vasopnuematic compression justification:  Per bilateral girth measures taken and listed above the edema is considered significant and having an impact on the patient's strength, balance, gait, transfers, self care, and ADLs    10 min Therapeutic Exercise:  [x] See flow sheet :  Bike for ROM  Dynamic warm-up quad stretch/hamstring stretch       Rationale: increase ROM and increase strength to improve the patients ability to improve gait mechanics     25 min Neuromuscular Re-education:  [x]  See flow sheet :  Eccentric landing on the L knee from 6\" step  Run walk on TM with therapist controlling speed - 30 sec on and 1 min walk recovery  Wall jumps   Ladder drill   Rationale: increase ROM and increase strength  to improve the patients ability to improve functional transfers    20 min Therapeutic Activity:  [x]  See flow sheet :  Walking lunge  Lateral lunges  Wall sits  High skips to prepare for running  Sumo squats         Rationale: increase ROM and increase strength  to improve the patients ability to improve functional transfers       8 min Manual Therapy:    STM and DTM with roller to the lateral quad and lateral hamstring with patient in sidelying and patella mobs with active standing quad set       The manual therapy interventions were performed at a separate and distinct time from the therapeutic activities interventions.   Rationale: decrease pain, increase ROM, and increase tissue extensibility to improve stability to stair negotiation         With   [] TE   [] TA   [] neuro   [] other: Patient Education: [x] Review HEP    [] Progressed/Changed HEP based on:   [] positioning   [] body mechanics   [] transfers   [] heat/ice application    [] other:      Other Objective/Functional Measures:   Patient was able to run/walk for 10 minutes total with intervals of 30 sec run and then either 30 sec or 1 min walk. Patient ran between 6.0 to 6.7 mph and did report more pain with slower speed than with faster speed - reported pain at 1-2/10 at slower speed  Per reported subjective and reports of some pinching to the medial aspect of the knee with jumping activities - performed patella mobs with gliding patella medially and roller to the lateral quad/hamstrings with active slow quad sets in standing with patient reporting decrease pain     GOALS  Pt will be able to run for 30 seconds/walk for 1 min for a total 10 min with no more than 2/10 pain in the L knee to return to work. MET 2/7/2023      Pain Level (0-10 scale) post treatment: 0/10    ASSESSMENT/Changes in Function:   Patient has one additional visit scheduled for next week to make up for the missed visit last week. Will update HEP to allow patient to continue with progressing independently to include a running  and strengthen  program     Patient will continue to benefit from skilled PT services to modify and progress therapeutic interventions, address functional mobility deficits, address ROM deficits, address strength deficits, analyze and address soft tissue restrictions, and address imbalance/dizziness to attain remaining goals. [x]  See Plan of Care  []  See progress note/recertification  []  See Discharge Summary         Progress towards goals / Updated goals:  New Goals to be achieved in __4__  weeks:  1. Pt will be able to perform a SL squat without pain and good form to return to PLOF. 2.  Pt will be able to hold a wall sit for 60 seconds as an indicator of improved functional quad strength/endurance. 3.  Pt will be able to perform front/lateral line jumps for 30 seconds without pain to return to running.  Able to perform high skips and SL tramp jumps without pain 1/24/23   4. Pt will be able to run for 30 seconds/walk for 1 min for a total 10 min with no more than 2/10 pain in the L knee to return to work. MET 2/7/2023        PLAN  [x]  Upgrade activities as tolerated     [x]  Continue plan of care  []  Update interventions per flow sheet       []  Discharge due to:_  []  Other:   Tati Hernandez, SANTOS 2/7/2023  1040 AM    No future appointments.

## 2023-02-21 ENCOUNTER — HOSPITAL ENCOUNTER (OUTPATIENT)
Facility: HOSPITAL | Age: 48
Setting detail: RECURRING SERIES
Discharge: HOME | End: 2023-02-24
Payer: COMMERCIAL

## 2023-02-21 PROCEDURE — 97530 THERAPEUTIC ACTIVITIES: CPT

## 2023-02-21 PROCEDURE — G0283 ELEC STIM OTHER THAN WOUND: HCPCS

## 2023-02-21 NOTE — PROGRESS NOTES
PHYSICAL / OCCUPATIONAL THERAPY - DAILY TREATMENT NOTE (updated )    Patient Name: Isabela Patten    Date: 2023    : 1975  Insurance: Payor: Penelope Caicedo [employer] / Plan: Rajesh Mcclain / Product Type: *No Product type* /      Patient  verified yes     Visit #   Current / Total 5 6   Time   In / Out 9:00 10:30   Total Treatment Time 90   Pain   In / Out 0/10 0/10   Subjective Functional Status/Changes: I would say overall I am 90-95% improved since starting therapy and I am ready to continue on my own with a program    Changes to:  Meds, Allergies, Med Hx, Sx Hx? If yes, update Summary List no       TREATMENT AREA =  Pain in left knee [M25.562]    OBJECTIVE    Modalities Rationale:     decrease edema, decrease inflammation, and decrease pain to improve patient's ability to progress to PLOF and address remaining functional goals. 15 min [x] Estim Unattended, type/location: IFC with CP to (L) knee                                      [x]  w/ice    []  w/heat    min [] Estim Attended, type/location:                                     []  w/US     []  w/ice    []  w/heat    []  TENS insruct      min []  Mechanical Traction: type/lbs                   []  pro   []  sup   []  int   []  cont    []  before manual    []  after manual    min []  Ultrasound, settings/location:      min []  Iontophoresis w/ dexamethasone, location:                                               []  take home patch       []  in clinic    min  unbilled []  Ice     []  Heat    location/position:     min []  Paraffin,  details:     min []  Vasopneumatic Device, press/temp:     min []  Sivakumar Suazo / [employer]:     If using vaso (only need to measure limb vaso being performed on)      pre-treatment girth :       post-treatment girth :       measured at (landmark location) :      min []  Other:    Skin assessment post-treatment (if applicable):    []  intact    []  redness- no adverse reaction                 []redness - adverse reaction: Therapeutic Procedures: Tx Min Billable or 1:1 Min (if diff from Tx Min) Procedure, Rationale, Specifics   [id number] [phone] [id number] Therapeutic Activity (timed):  use of dynamic activities replicating functional movements to increase ROM, strength, coordination, balance, and proprioception in order to improve patient's ability to progress to PLOF and address remaining functional goals.   (see flow sheet as applicable)     Details if applicable:    Dead [employer] and side planks    Butt burners    [employer] with hip extension    sidelying hip adduction (for inner thigh)   Skill Development    Wall sits   sumo squats    Lateral lunges   Resistance Training   Squat to [employer] with holding 10# weight in front    Side lying resisted clams - 2 way -    Deadlifts with hip thrust against resisted band/heavy weight   Glut bridges with weight across hips   step up on box with slow return -medium height    Walking lunges   run /walk cycle on the treadmill for 10 minutes                                75 54 MC BC Totals Reminder: bill using total billable min of TIMED therapeutic procedures (example: do not include dry needle or estim unattended, both untimed codes, in totals to left)  8-22 min = 1 unit; 23-37 min = 2 units; 38-52 min = 3 units; 53-67 min = 4 units; 68-82 min = 5 units   Total Total     [x]  Patient Education billed concurrently with other procedures   [x] Review HEP    [] Progressed/Changed HEP, detail:    [] Other detail:       Objective Information/Functional Measures:  FOTO score is 91/100   Patient was able to perform SL squat - 2 reps without pain and then 2 more reps with slight \"pinch\" to medial aspect of the knee  Patient is able to hold wall sit for 60 sec with no pain and good quad strength and endurance noted  Patient was able to perform 30 second front and lateral line jumps with no pain   Patient was able to perform a run /walk cycle on the treadmill for 10 minutes without pain or fatigue   Gave patient update HEP as stated above for patient to continue 3 times a week     Assessment:  Patient is ready to assume I management with HEP and be d/c from therapy           Progress toward goals / Updated goals:  []  See Progress Note/Recertification  Progress towards goals / Updated goals:  New Goals to be achieved in __4__  weeks:  1. Pt will be able to perform a SL squat without pain and good form to return to PLOF. MET 2/21/2023   2. Pt will be able to hold a wall sit for 60 seconds as an indicator of improved functional quad strength/endurance. MET 2/21/2023   3. Pt will be able to perform front/lateral line jumps for 30 seconds without pain to return to running. MET 2/21/2023   4. Pt will be able to run for 30 seconds/walk for 1 min for a total 10 min with no more than 2/10 pain in the L knee to return to work.  MET 2/21/2023          PLAN  yes Continue plan of care  []  Upgrade activities as tolerated  [x]  Discharge due to :Patient is ready to assume I management with HEP and be d/c from therapy   []  Other:    Meghan Avila PTA    2/21/2023    9:05 AM    Future Appointments   Date Time Provider Debra Kingsley   6/13/2023  2:00 PM JOHN Bryson   6/13/2023  2:45 PM Terrance Mon 6

## 2024-08-22 ENCOUNTER — NEW PATIENT (OUTPATIENT)
Dept: URBAN - METROPOLITAN AREA CLINIC 2 | Facility: CLINIC | Age: 49
End: 2024-08-22

## 2024-08-22 DIAGNOSIS — H52.221: ICD-10-CM

## 2024-08-22 DIAGNOSIS — H52.13: ICD-10-CM

## 2024-08-22 DIAGNOSIS — H52.4: ICD-10-CM

## 2024-08-22 PROCEDURE — 92015 DETERMINE REFRACTIVE STATE: CPT

## 2024-08-22 PROCEDURE — 92004 COMPRE OPH EXAM NEW PT 1/>: CPT

## 2024-08-22 ASSESSMENT — VISUAL ACUITY
OD_CC: 20/20
OS_SC: J1
OS_CC: 20/20
OD_SC: J1+

## 2024-08-22 ASSESSMENT — TONOMETRY
OD_IOP_MMHG: 16
OS_IOP_MMHG: 16

## 2024-09-03 ENCOUNTER — COMPREHENSIVE EXAM (OUTPATIENT)
Dept: URBAN - METROPOLITAN AREA CLINIC 2 | Facility: CLINIC | Age: 49
End: 2024-09-03

## 2024-09-03 DIAGNOSIS — S05.8X2S: ICD-10-CM

## 2024-09-03 DIAGNOSIS — H26.102: ICD-10-CM

## 2024-09-03 DIAGNOSIS — H57.02: ICD-10-CM

## 2024-09-03 DIAGNOSIS — H25.11: ICD-10-CM

## 2024-09-03 DIAGNOSIS — H53.59: ICD-10-CM

## 2024-09-03 PROCEDURE — 92014 COMPRE OPH EXAM EST PT 1/>: CPT

## 2024-09-03 PROCEDURE — 92015 DETERMINE REFRACTIVE STATE: CPT

## 2024-09-20 ENCOUNTER — CONSULTATION/EVALUATION (OUTPATIENT)
Dept: URBAN - METROPOLITAN AREA CLINIC 2 | Facility: CLINIC | Age: 49
End: 2024-09-20

## 2024-09-20 DIAGNOSIS — H53.59: ICD-10-CM

## 2024-09-20 DIAGNOSIS — H26.102: ICD-10-CM

## 2024-09-20 DIAGNOSIS — H35.362: ICD-10-CM

## 2024-09-20 DIAGNOSIS — H25.11: ICD-10-CM

## 2024-09-20 DIAGNOSIS — S05.8X2S: ICD-10-CM

## 2024-09-20 DIAGNOSIS — H57.02: ICD-10-CM

## 2024-09-20 PROCEDURE — 92004 COMPRE OPH EXAM NEW PT 1/>: CPT

## 2024-09-20 PROCEDURE — 92134 CPTRZ OPH DX IMG PST SGM RTA: CPT

## 2024-09-20 PROCEDURE — 92136 OPHTHALMIC BIOMETRY: CPT

## 2025-05-06 ENCOUNTER — CONSULTATION/EVALUATION (OUTPATIENT)
Age: 50
End: 2025-05-06

## 2025-05-06 VITALS
HEART RATE: 75 BPM | HEIGHT: 69 IN | BODY MASS INDEX: 27.4 KG/M2 | SYSTOLIC BLOOD PRESSURE: 115 MMHG | WEIGHT: 185 LBS | DIASTOLIC BLOOD PRESSURE: 68 MMHG

## 2025-05-06 DIAGNOSIS — H26.102: ICD-10-CM

## 2025-05-06 DIAGNOSIS — H25.11: ICD-10-CM

## 2025-05-06 PROCEDURE — 92025 CPTRIZED CORNEAL TOPOGRAPHY: CPT | Mod: NC

## 2025-05-06 PROCEDURE — 92136 OPHTHALMIC BIOMETRY: CPT

## 2025-05-06 PROCEDURE — 92012 INTRM OPH EXAM EST PATIENT: CPT

## 2025-05-14 ENCOUNTER — SURGERY/PROCEDURE (OUTPATIENT)
Age: 50
End: 2025-05-14

## 2025-05-14 DIAGNOSIS — H26.102: ICD-10-CM

## 2025-05-14 PROCEDURE — 66984 XCAPSL CTRC RMVL W/O ECP: CPT

## 2025-05-15 ENCOUNTER — POST-OP (OUTPATIENT)
Age: 50
End: 2025-05-15

## 2025-05-15 DIAGNOSIS — Z96.1: ICD-10-CM

## 2025-06-13 ENCOUNTER — POST-OP (OUTPATIENT)
Age: 50
End: 2025-06-13

## 2025-06-13 DIAGNOSIS — Z96.1: ICD-10-CM
